# Patient Record
Sex: MALE | Race: WHITE | NOT HISPANIC OR LATINO | Employment: OTHER | URBAN - NONMETROPOLITAN AREA
[De-identification: names, ages, dates, MRNs, and addresses within clinical notes are randomized per-mention and may not be internally consistent; named-entity substitution may affect disease eponyms.]

---

## 2017-01-06 RX ORDER — CANDESARTAN 4 MG/1
TABLET ORAL
Qty: 90 TABLET | Refills: 0 | Status: SHIPPED | OUTPATIENT
Start: 2017-01-06 | End: 2017-04-04 | Stop reason: SDUPTHER

## 2017-02-14 RX ORDER — CLONAZEPAM 1 MG/1
1 TABLET ORAL DAILY
Qty: 90 TABLET | Refills: 0 | OUTPATIENT
Start: 2017-02-14 | End: 2017-05-16 | Stop reason: SDUPTHER

## 2017-03-07 ENCOUNTER — OFFICE VISIT (OUTPATIENT)
Dept: INTERNAL MEDICINE | Facility: CLINIC | Age: 82
End: 2017-03-07

## 2017-03-07 VITALS
OXYGEN SATURATION: 97 % | TEMPERATURE: 97.4 F | HEIGHT: 69 IN | SYSTOLIC BLOOD PRESSURE: 122 MMHG | HEART RATE: 60 BPM | BODY MASS INDEX: 19.11 KG/M2 | DIASTOLIC BLOOD PRESSURE: 72 MMHG | WEIGHT: 129 LBS

## 2017-03-07 DIAGNOSIS — E78.5 HYPERLIPIDEMIA, UNSPECIFIED HYPERLIPIDEMIA TYPE: ICD-10-CM

## 2017-03-07 DIAGNOSIS — E03.8 OTHER SPECIFIED HYPOTHYROIDISM: ICD-10-CM

## 2017-03-07 DIAGNOSIS — I10 ESSENTIAL HYPERTENSION: Primary | ICD-10-CM

## 2017-03-07 DIAGNOSIS — K21.9 GASTROESOPHAGEAL REFLUX DISEASE WITHOUT ESOPHAGITIS: ICD-10-CM

## 2017-03-07 DIAGNOSIS — K59.01 SLOW TRANSIT CONSTIPATION: ICD-10-CM

## 2017-03-07 PROCEDURE — 99214 OFFICE O/P EST MOD 30 MIN: CPT | Performed by: INTERNAL MEDICINE

## 2017-03-07 NOTE — PROGRESS NOTES
"Chief Complaint   Patient presents with   • Follow-up     4 months for HLD, HTN, and GERD. No new complaints.      Subjective   Checo Michele is a 89 y.o. male.     HPI Comments: PMH of HTN, HLD, GERD, DJD, anxiety, and incontinence.   He takes klonapin for anxiety and sleep at night and is doing well with this.   BP is good today. No CP, SOB or edema. NO palpitations.   No GERD sxs.   His urine incontinence is doing well on 4mg of detrol.  He saw Dr Monique in December and no changes were made at that time.   He takes thyroid med every AM, but has been taking it with his vitamins.  His constipation is bothersome. He is taking 3 capful of miralax daily. He is taking a vegetable laxative every night.   He has BM 2-3 times a day- these are loose stools.  He states that if he does not keep bowel movements loose he has severe difficulty having a bowel movement due to a kink near the tip of his rectum.        The following portions of the patient's history were reviewed and updated as appropriate: allergies, current medications, past family history, past medical history, past social history, past surgical history and problem list.    Review of Systems   Respiratory: Negative for shortness of breath.    Cardiovascular: Negative for chest pain, palpitations and leg swelling.   Psychiatric/Behavioral: Negative for dysphoric mood and sleep disturbance. The patient is not nervous/anxious.    All other systems reviewed and are negative.      Objective   Visit Vitals   • /72   • Pulse 60   • Temp 97.4 °F (36.3 °C)   • Ht 69\" (175.3 cm)   • Wt 129 lb (58.5 kg)   • SpO2 97%   • BMI 19.05 kg/m2     Body mass index is 19.05 kg/(m^2).  Physical Exam   Constitutional: He is oriented to person, place, and time.   Thin elderly male in no apparent distress   HENT:   Head: Normocephalic and atraumatic.   Mouth/Throat: Oropharynx is clear and moist.   Hearing aids in place   Eyes: Conjunctivae and EOM are normal. Pupils are " equal, round, and reactive to light.   Neck: Normal range of motion. Neck supple. No thyromegaly present.   Cardiovascular: Normal rate, regular rhythm, normal heart sounds and intact distal pulses.    No murmur heard.  Pulmonary/Chest: Effort normal and breath sounds normal. No respiratory distress.   Abdominal: Soft. Bowel sounds are normal.   Musculoskeletal: He exhibits no edema.   Patient ambulates with slight shuffling gait   Lymphadenopathy:     He has no cervical adenopathy.   Neurological: He is alert and oriented to person, place, and time. No cranial nerve deficit.   Psychiatric: He has a normal mood and affect. Judgment normal.   Nursing note and vitals reviewed.      Assessment/Plan   Checo Michele is here today and the following problems have been addressed:      Checo was seen today for follow-up.    Diagnoses and all orders for this visit:    Essential hypertension    Hyperlipidemia, unspecified hyperlipidemia type  -     Lipid Panel; Future  -     Comprehensive Metabolic Panel; Future    Gastroesophageal reflux disease without esophagitis    Slow transit constipation    Other specified hypothyroidism  -     T4, Free; Future  -     TSH; Future    Follow heart healthy/low salt diet  Take all medications as prescribed  Labs as noted  Take thyroid med on empty stomach  Continue MiraLAX and Senokot for constipation issues  Encouraged 3 meals a day to prevent further weight loss  Filled out handicap parking permit for patient today    RTC 4 mo  Please note that portions of this note were completed with a voice recognition program.  Efforts were made to edit dictation, but occasionally words are mistranscribed.

## 2017-03-08 ENCOUNTER — LAB (OUTPATIENT)
Dept: INTERNAL MEDICINE | Facility: CLINIC | Age: 82
End: 2017-03-08

## 2017-03-08 DIAGNOSIS — E03.8 OTHER SPECIFIED HYPOTHYROIDISM: ICD-10-CM

## 2017-03-08 DIAGNOSIS — E78.5 HYPERLIPIDEMIA, UNSPECIFIED HYPERLIPIDEMIA TYPE: ICD-10-CM

## 2017-03-08 LAB
ALBUMIN SERPL-MCNC: 4 G/DL (ref 3.2–4.8)
ALBUMIN/GLOB SERPL: 1.7 G/DL (ref 1.5–2.5)
ALP SERPL-CCNC: 73 U/L (ref 25–100)
ALT SERPL W P-5'-P-CCNC: 15 U/L (ref 7–40)
ANION GAP SERPL CALCULATED.3IONS-SCNC: 6 MMOL/L (ref 3–11)
ARTICHOKE IGE QN: 93 MG/DL (ref 0–130)
AST SERPL-CCNC: 25 U/L (ref 0–33)
BILIRUB SERPL-MCNC: 1.3 MG/DL (ref 0.3–1.2)
BUN BLD-MCNC: 8 MG/DL (ref 9–23)
BUN/CREAT SERPL: 11.4 (ref 7–25)
CALCIUM SPEC-SCNC: 9.5 MG/DL (ref 8.7–10.4)
CHLORIDE SERPL-SCNC: 100 MMOL/L (ref 99–109)
CHOLEST SERPL-MCNC: 155 MG/DL (ref 0–200)
CO2 SERPL-SCNC: 31 MMOL/L (ref 20–31)
CREAT BLD-MCNC: 0.7 MG/DL (ref 0.6–1.3)
GFR SERPL CREATININE-BSD FRML MDRD: 106 ML/MIN/1.73
GLOBULIN UR ELPH-MCNC: 2.4 GM/DL
GLUCOSE BLD-MCNC: 91 MG/DL (ref 70–100)
HDLC SERPL-MCNC: 54 MG/DL (ref 40–60)
POTASSIUM BLD-SCNC: 5 MMOL/L (ref 3.5–5.5)
PROT SERPL-MCNC: 6.4 G/DL (ref 5.7–8.2)
SODIUM BLD-SCNC: 137 MMOL/L (ref 132–146)
T4 FREE SERPL-MCNC: 1.21 NG/DL (ref 0.89–1.76)
TRIGL SERPL-MCNC: 84 MG/DL (ref 0–150)
TSH SERPL DL<=0.05 MIU/L-ACNC: 3 MIU/ML (ref 0.35–5.35)

## 2017-03-08 PROCEDURE — 36415 COLL VENOUS BLD VENIPUNCTURE: CPT | Performed by: INTERNAL MEDICINE

## 2017-03-08 PROCEDURE — 80053 COMPREHEN METABOLIC PANEL: CPT | Performed by: INTERNAL MEDICINE

## 2017-03-08 PROCEDURE — 84439 ASSAY OF FREE THYROXINE: CPT | Performed by: INTERNAL MEDICINE

## 2017-03-08 PROCEDURE — 80061 LIPID PANEL: CPT | Performed by: INTERNAL MEDICINE

## 2017-03-08 PROCEDURE — 84443 ASSAY THYROID STIM HORMONE: CPT | Performed by: INTERNAL MEDICINE

## 2017-03-10 RX ORDER — ATORVASTATIN CALCIUM 20 MG/1
TABLET, FILM COATED ORAL
Qty: 90 TABLET | Refills: 1 | Status: SHIPPED | OUTPATIENT
Start: 2017-03-10 | End: 2017-07-07 | Stop reason: SDUPTHER

## 2017-03-10 RX ORDER — MIRTAZAPINE 30 MG/1
TABLET, FILM COATED ORAL
Qty: 90 TABLET | Refills: 1 | Status: SHIPPED | OUTPATIENT
Start: 2017-03-10 | End: 2017-07-07 | Stop reason: SDUPTHER

## 2017-04-04 RX ORDER — CANDESARTAN 4 MG/1
TABLET ORAL
Qty: 90 TABLET | Refills: 1 | Status: SHIPPED | OUTPATIENT
Start: 2017-04-04 | End: 2017-10-01 | Stop reason: SDUPTHER

## 2017-05-04 ENCOUNTER — APPOINTMENT (OUTPATIENT)
Dept: CT IMAGING | Facility: HOSPITAL | Age: 82
End: 2017-05-04

## 2017-05-04 ENCOUNTER — HOSPITAL ENCOUNTER (EMERGENCY)
Facility: HOSPITAL | Age: 82
Discharge: HOME | End: 2017-05-04
Attending: EMERGENCY MEDICINE | Admitting: EMERGENCY MEDICINE

## 2017-05-04 ENCOUNTER — TELEPHONE (OUTPATIENT)
Dept: INTERNAL MEDICINE | Facility: CLINIC | Age: 82
End: 2017-05-04

## 2017-05-04 VITALS
HEART RATE: 62 BPM | RESPIRATION RATE: 18 BRPM | BODY MASS INDEX: 19.26 KG/M2 | DIASTOLIC BLOOD PRESSURE: 75 MMHG | WEIGHT: 130 LBS | OXYGEN SATURATION: 100 % | HEIGHT: 69 IN | SYSTOLIC BLOOD PRESSURE: 162 MMHG | TEMPERATURE: 98.9 F

## 2017-05-04 DIAGNOSIS — K59.09 CHRONIC CONSTIPATION: Primary | ICD-10-CM

## 2017-05-04 PROCEDURE — 93005 ELECTROCARDIOGRAM TRACING: CPT

## 2017-05-04 PROCEDURE — 74176 CT ABD & PELVIS W/O CONTRAST: CPT

## 2017-05-04 PROCEDURE — 93005 ELECTROCARDIOGRAM TRACING: CPT | Performed by: EMERGENCY MEDICINE

## 2017-05-04 PROCEDURE — 99283 EMERGENCY DEPT VISIT LOW MDM: CPT

## 2017-05-11 ENCOUNTER — TELEPHONE (OUTPATIENT)
Dept: INTERNAL MEDICINE | Facility: CLINIC | Age: 82
End: 2017-05-11

## 2017-05-16 RX ORDER — CLONAZEPAM 1 MG/1
1 TABLET ORAL DAILY
Qty: 90 TABLET | Refills: 0 | OUTPATIENT
Start: 2017-05-16 | End: 2017-08-10 | Stop reason: SDUPTHER

## 2017-05-18 ENCOUNTER — OFFICE VISIT (OUTPATIENT)
Dept: GASTROENTEROLOGY | Facility: CLINIC | Age: 82
End: 2017-05-18

## 2017-05-18 VITALS
RESPIRATION RATE: 15 BRPM | HEART RATE: 59 BPM | WEIGHT: 127 LBS | SYSTOLIC BLOOD PRESSURE: 153 MMHG | TEMPERATURE: 98.4 F | BODY MASS INDEX: 18.81 KG/M2 | DIASTOLIC BLOOD PRESSURE: 63 MMHG | HEIGHT: 69 IN

## 2017-05-18 DIAGNOSIS — Z78.9 FREQUENT USE OF LAXATIVES: Chronic | ICD-10-CM

## 2017-05-18 DIAGNOSIS — K59.00 CONSTIPATION, UNSPECIFIED CONSTIPATION TYPE: Primary | ICD-10-CM

## 2017-05-18 PROCEDURE — 99203 OFFICE O/P NEW LOW 30 MIN: CPT | Performed by: NURSE PRACTITIONER

## 2017-06-14 ENCOUNTER — TELEPHONE (OUTPATIENT)
Dept: INTERNAL MEDICINE | Facility: CLINIC | Age: 82
End: 2017-06-14

## 2017-06-14 RX ORDER — LEVOTHYROXINE SODIUM 0.03 MG/1
TABLET ORAL
Qty: 135 TABLET | Refills: 3 | Status: SHIPPED | OUTPATIENT
Start: 2017-06-14 | End: 2018-06-09 | Stop reason: SDUPTHER

## 2017-06-29 ENCOUNTER — OFFICE VISIT (OUTPATIENT)
Dept: GASTROENTEROLOGY | Facility: CLINIC | Age: 82
End: 2017-06-29

## 2017-06-29 VITALS
BODY MASS INDEX: 18.96 KG/M2 | RESPIRATION RATE: 15 BRPM | HEART RATE: 62 BPM | HEIGHT: 69 IN | TEMPERATURE: 97.7 F | SYSTOLIC BLOOD PRESSURE: 195 MMHG | WEIGHT: 128 LBS | DIASTOLIC BLOOD PRESSURE: 76 MMHG

## 2017-06-29 DIAGNOSIS — K59.00 CONSTIPATION, UNSPECIFIED CONSTIPATION TYPE: Primary | ICD-10-CM

## 2017-06-29 PROCEDURE — 99214 OFFICE O/P EST MOD 30 MIN: CPT | Performed by: INTERNAL MEDICINE

## 2017-07-04 NOTE — PATIENT INSTRUCTIONS
1. High-fiber diet with liberal water intake.  Discussed in detail.  2. The patient may take magnesium based products as such as Nielsen magnesium caplet over-the-counter one at night on daily basis.  3. He may discontinue stool softener on regular basis however on medications the patient may take stool softener if needed.  4. The patient may call back in one to 2 weeks regarding progress.  5. Follow-up:  The patient will call back on an as-needed basis.

## 2017-07-07 ENCOUNTER — HOSPITAL ENCOUNTER (OUTPATIENT)
Dept: GENERAL RADIOLOGY | Facility: HOSPITAL | Age: 82
Discharge: HOME | End: 2017-07-07
Attending: INTERNAL MEDICINE | Admitting: INTERNAL MEDICINE

## 2017-07-07 ENCOUNTER — OFFICE VISIT (OUTPATIENT)
Dept: INTERNAL MEDICINE | Facility: CLINIC | Age: 82
End: 2017-07-07

## 2017-07-07 VITALS
OXYGEN SATURATION: 97 % | SYSTOLIC BLOOD PRESSURE: 148 MMHG | TEMPERATURE: 98 F | HEART RATE: 64 BPM | WEIGHT: 128 LBS | DIASTOLIC BLOOD PRESSURE: 68 MMHG | BODY MASS INDEX: 18.96 KG/M2 | HEIGHT: 69 IN

## 2017-07-07 DIAGNOSIS — E78.2 MIXED HYPERLIPIDEMIA: ICD-10-CM

## 2017-07-07 DIAGNOSIS — M25.561 CHRONIC PAIN OF RIGHT KNEE: ICD-10-CM

## 2017-07-07 DIAGNOSIS — I10 ESSENTIAL HYPERTENSION: Primary | ICD-10-CM

## 2017-07-07 DIAGNOSIS — G89.29 CHRONIC PAIN OF RIGHT KNEE: ICD-10-CM

## 2017-07-07 DIAGNOSIS — K21.9 GASTROESOPHAGEAL REFLUX DISEASE WITHOUT ESOPHAGITIS: ICD-10-CM

## 2017-07-07 DIAGNOSIS — K59.01 SLOW TRANSIT CONSTIPATION: ICD-10-CM

## 2017-07-07 PROCEDURE — 73562 X-RAY EXAM OF KNEE 3: CPT

## 2017-07-07 PROCEDURE — 99214 OFFICE O/P EST MOD 30 MIN: CPT | Performed by: INTERNAL MEDICINE

## 2017-07-07 RX ORDER — MIRTAZAPINE 30 MG/1
30 TABLET, FILM COATED ORAL NIGHTLY
Qty: 90 TABLET | Refills: 1 | Status: SHIPPED | OUTPATIENT
Start: 2017-07-07 | End: 2018-01-29 | Stop reason: SDUPTHER

## 2017-07-07 RX ORDER — ATORVASTATIN CALCIUM 20 MG/1
20 TABLET, FILM COATED ORAL DAILY
Qty: 90 TABLET | Refills: 1 | Status: SHIPPED | OUTPATIENT
Start: 2017-07-07 | End: 2018-01-29 | Stop reason: SDUPTHER

## 2017-07-07 RX ORDER — ATENOLOL 100 MG/1
100 TABLET ORAL DAILY
Qty: 90 TABLET | Refills: 3 | Status: SHIPPED | OUTPATIENT
Start: 2017-07-07 | End: 2017-07-10 | Stop reason: SDUPTHER

## 2017-07-07 RX ORDER — AMLODIPINE BESYLATE 5 MG/1
5 TABLET ORAL DAILY
Qty: 30 TABLET | Refills: 1 | Status: SHIPPED | OUTPATIENT
Start: 2017-07-07 | End: 2017-08-18 | Stop reason: SDUPTHER

## 2017-07-07 NOTE — PROGRESS NOTES
"Chief Complaint   Patient presents with   • Follow-up     4 months for GERD, HLD, and HTN. Pt states he's been having right knee pain.      Subjective   Checo Michele is a 89 y.o. male.     HPI Comments: PMH of HTN, HLD, GERD, DJD, anxiety, and incontinence.   He takes klonapin for anxiety and sleep at night and is doing well with this.   BP is elevated today. No CP, SOB or edema. NO palpitations.  He has not been checking BP.  It has been elevated at recent doctors visits.   No GERD sxs.  He takes an occasional gas-X.   His urine incontinence is doing well on 4mg of detrol. This is working well for him overally.   He takes thyroid med every AM by itself.  He denies any heat or cold intolerace.   His constipation is bothersome. He has been seeing Dr Bridges.  Was told he was taking too many laxative.  He takes more fiber and less laxatives now with occasional MOM caplet.   He does not follow a specific diet for his cholesterol.  He is having some right knee pain.        The following portions of the patient's history were reviewed and updated as appropriate: allergies, current medications, past family history, past medical history, past social history, past surgical history and problem list.    Review of Systems   Respiratory: Negative for shortness of breath.    Cardiovascular: Negative for chest pain, palpitations and leg swelling.   Gastrointestinal: Positive for constipation.   Genitourinary:        Mild urinary incontinence   Musculoskeletal: Positive for arthralgias.   Psychiatric/Behavioral: Positive for sleep disturbance. The patient is nervous/anxious.    All other systems reviewed and are negative.      Objective   /68  Pulse 64  Temp 98 °F (36.7 °C)  Ht 69\" (175.3 cm)  Wt 128 lb (58.1 kg)  SpO2 97%  BMI 18.9 kg/m2  Body mass index is 18.9 kg/(m^2).  Physical Exam   Constitutional: He is oriented to person, place, and time. He appears well-developed and well-nourished.   HENT:   Head: " Normocephalic and atraumatic.   Mouth/Throat: Oropharynx is clear and moist.   Poor dentition noted   Eyes: Conjunctivae and EOM are normal. Pupils are equal, round, and reactive to light.   Neck: Normal range of motion. Neck supple. No thyromegaly present.   Cardiovascular: Normal rate, regular rhythm, normal heart sounds and intact distal pulses.    No murmur heard.  Pulmonary/Chest: Effort normal and breath sounds normal. No respiratory distress.   Abdominal: Soft. Bowel sounds are normal.   Musculoskeletal: He exhibits no edema.   Right knee with full range of motion but pain inferior to patella bilaterally, no ballottement or effusion, negative drawer and Lachman sign   Lymphadenopathy:     He has no cervical adenopathy.   Neurological: He is alert and oriented to person, place, and time. No cranial nerve deficit.   Psychiatric: He has a normal mood and affect. Judgment normal.   Nursing note and vitals reviewed.      Assessment/Plan   Checo Michele is here today and the following problems have been addressed:      Checo was seen today for follow-up.    Diagnoses and all orders for this visit:    Essential hypertension    Mixed hyperlipidemia    Gastroesophageal reflux disease without esophagitis    Slow transit constipation    Chronic pain of right knee  -     XR Knee 3 View Right; Future    Other orders  -     mirtazapine (REMERON) 30 MG tablet; Take 1 tablet by mouth Every Night.  -     atenolol (TENORMIN) 100 MG tablet; Take 1 tablet by mouth Daily.  -     atorvastatin (LIPITOR) 20 MG tablet; Take 1 tablet by mouth Daily.  -     amLODIPine (NORVASC) 5 MG tablet; Take 1 tablet by mouth Daily.  Follow heart healthy/low salt diet  Avoid processed foods  Monitor blood pressure as discussed  Exercise as tolerated up to 30 minutes 5 days per week  Take all medications as prescribed  Start amlodipine 5 mg q AM  XR of right knee ordered due to chronic pain and feeling it will give out on him  No other  changes today    RTC  6 weeks    Please note that portions of this note were completed with a voice recognition program.  Efforts were made to edit dictation, but occasionally words are mistranscribed.

## 2017-07-10 ENCOUNTER — TELEPHONE (OUTPATIENT)
Dept: INTERNAL MEDICINE | Facility: CLINIC | Age: 82
End: 2017-07-10

## 2017-07-10 RX ORDER — ATENOLOL 50 MG/1
100 TABLET ORAL DAILY
Qty: 180 TABLET | Refills: 1 | Status: SHIPPED | OUTPATIENT
Start: 2017-07-10 | End: 2017-12-19 | Stop reason: SDUPTHER

## 2017-07-10 RX ORDER — ATENOLOL 50 MG/1
100 TABLET ORAL DAILY
Qty: 180 TABLET | Refills: 1 | Status: CANCELLED | OUTPATIENT
Start: 2017-07-10

## 2017-07-10 NOTE — TELEPHONE ENCOUNTER
Patient said [facility] doesn't have the 100mg tablets anymore. I explained to him that it looks like he's taking the 50mg tablets twice a day, so it should be okay when it gets reordered.    He'd like a callback if there's any problems.

## 2017-07-11 ENCOUNTER — TELEPHONE (OUTPATIENT)
Dept: INTERNAL MEDICINE | Facility: CLINIC | Age: 82
End: 2017-07-11

## 2017-07-11 NOTE — TELEPHONE ENCOUNTER
----- Message from Marilyn K Vermeesch, MD sent at 7/11/2017  7:49 AM EDT -----  Please tell patient that x-ray of knee is consistent with arthritis changes only.

## 2017-08-10 RX ORDER — CLONAZEPAM 1 MG/1
1 TABLET ORAL DAILY
Qty: 90 TABLET | Refills: 0 | OUTPATIENT
Start: 2017-08-10 | End: 2017-08-10 | Stop reason: SDUPTHER

## 2017-08-10 RX ORDER — CLONAZEPAM 1 MG/1
1 TABLET ORAL DAILY
Qty: 90 TABLET | Refills: 0 | OUTPATIENT
Start: 2017-08-10 | End: 2017-11-06 | Stop reason: SDUPTHER

## 2017-08-18 ENCOUNTER — OFFICE VISIT (OUTPATIENT)
Dept: INTERNAL MEDICINE | Facility: CLINIC | Age: 82
End: 2017-08-18

## 2017-08-18 VITALS
WEIGHT: 127 LBS | SYSTOLIC BLOOD PRESSURE: 124 MMHG | HEART RATE: 60 BPM | HEIGHT: 69 IN | TEMPERATURE: 98.2 F | BODY MASS INDEX: 18.81 KG/M2 | DIASTOLIC BLOOD PRESSURE: 66 MMHG | OXYGEN SATURATION: 96 %

## 2017-08-18 DIAGNOSIS — S51.811A SKIN TEAR OF RIGHT FOREARM WITHOUT COMPLICATION, INITIAL ENCOUNTER: ICD-10-CM

## 2017-08-18 DIAGNOSIS — I10 ESSENTIAL HYPERTENSION: Primary | ICD-10-CM

## 2017-08-18 PROCEDURE — 99213 OFFICE O/P EST LOW 20 MIN: CPT | Performed by: INTERNAL MEDICINE

## 2017-08-18 RX ORDER — AMLODIPINE BESYLATE 5 MG/1
5 TABLET ORAL DAILY
Qty: 30 TABLET | Refills: 11 | Status: SHIPPED | OUTPATIENT
Start: 2017-08-18 | End: 2018-03-16

## 2017-08-18 NOTE — PROGRESS NOTES
"Chief Complaint   Patient presents with   • Follow-up     6 weeks for HTN and right knee pain. Pt has wound on right forearm.      Subjective   Checo Michele is a 89 y.o. male.     HPI Comments: Here for followup of HTN and right knee pain.  Last visit he was started on amlodipine 5 mg daily.  His BP is running in the 110-120/50-70s.   No CP, SOB, edema.   XR of right knee showed only DJD changes.   No falls.  He takes ES tylenol prn for pain.    He has a skin tear to right forearm. This occurred 4 days ago.  It is slightly red.  He was seen at  and given bactrim and mupirocin, but does not want to take the bactrim for it.        The following portions of the patient's history were reviewed and updated as appropriate: allergies, current medications, past family history, past medical history, past social history, past surgical history and problem list.    Review of Systems   Respiratory: Negative for shortness of breath.    Cardiovascular: Negative for chest pain, palpitations and leg swelling.   Musculoskeletal: Positive for arthralgias.   Skin: Positive for wound.       Objective   /66  Pulse 60  Temp 98.2 °F (36.8 °C)  Ht 69\" (175.3 cm)  Wt 127 lb (57.6 kg)  SpO2 96%  BMI 18.75 kg/m2  Body mass index is 18.75 kg/(m^2).  Physical Exam   Constitutional: He is oriented to person, place, and time. He appears well-developed and well-nourished.   HENT:   Head: Normocephalic and atraumatic.   Eyes: EOM are normal. Pupils are equal, round, and reactive to light.   Cardiovascular: Normal rate, regular rhythm, normal heart sounds and intact distal pulses.    No murmur heard.  Pulmonary/Chest: Effort normal and breath sounds normal.   Musculoskeletal: He exhibits no edema.   Neurological: He is alert and oriented to person, place, and time.   Skin:   Right forearm V-shaped skin tear with surrounding erythema approximately 1 cm and mild increased warmth but no discharge noted and no tenderness to " palpation   Psychiatric: He has a normal mood and affect. Judgment normal.   Nursing note and vitals reviewed.      Assessment/Plan   Checo Michele is here today and the following problems have been addressed:      Checo was seen today for follow-up.    Diagnoses and all orders for this visit:    Essential hypertension    Skin tear of right forearm without complication, initial encounter    Other orders  -     amLODIPine (NORVASC) 5 MG tablet; Take 1 tablet by mouth Daily.    Follow heart healthy/low salt diet  Avoid processed foods  Monitor blood pressure as discussed  Exercise as tolerated up to 30 minutes 5 days per week  Take all medications as prescribed  Continue mucipiron for skin tear twice a day and use bactrim only if surrounding redness worsens-area of erythema was marked with pen and patient was told to take Bactrim if erythema extended beyond pen line    RTC 3 mo, labs then  Please note that portions of this note were completed with a voice recognition program.  Efforts were made to edit dictation, but occasionally words are mistranscribed.

## 2017-10-02 RX ORDER — CANDESARTAN 4 MG/1
TABLET ORAL
Qty: 90 TABLET | Refills: 1 | Status: SHIPPED | OUTPATIENT
Start: 2017-10-02 | End: 2018-03-29 | Stop reason: SDUPTHER

## 2017-11-07 RX ORDER — CLONAZEPAM 1 MG/1
TABLET ORAL
Qty: 90 TABLET | Refills: 0 | Status: SHIPPED | OUTPATIENT
Start: 2017-11-07 | End: 2017-11-07 | Stop reason: SDUPTHER

## 2017-11-07 RX ORDER — CLONAZEPAM 1 MG/1
1 TABLET ORAL DAILY
Qty: 90 TABLET | Refills: 0 | OUTPATIENT
Start: 2017-11-07 | End: 2018-02-08 | Stop reason: SDUPTHER

## 2017-11-16 ENCOUNTER — OFFICE VISIT (OUTPATIENT)
Dept: INTERNAL MEDICINE | Facility: CLINIC | Age: 82
End: 2017-11-16

## 2017-11-16 VITALS
DIASTOLIC BLOOD PRESSURE: 62 MMHG | HEIGHT: 69 IN | SYSTOLIC BLOOD PRESSURE: 122 MMHG | BODY MASS INDEX: 18.51 KG/M2 | TEMPERATURE: 98.1 F | HEART RATE: 57 BPM | WEIGHT: 125 LBS | OXYGEN SATURATION: 96 %

## 2017-11-16 DIAGNOSIS — K59.01 SLOW TRANSIT CONSTIPATION: ICD-10-CM

## 2017-11-16 DIAGNOSIS — E78.2 MIXED HYPERLIPIDEMIA: ICD-10-CM

## 2017-11-16 DIAGNOSIS — K21.9 GASTROESOPHAGEAL REFLUX DISEASE WITHOUT ESOPHAGITIS: ICD-10-CM

## 2017-11-16 DIAGNOSIS — I10 ESSENTIAL HYPERTENSION: Primary | ICD-10-CM

## 2017-11-16 DIAGNOSIS — E03.8 OTHER SPECIFIED HYPOTHYROIDISM: ICD-10-CM

## 2017-11-16 PROBLEM — S51.811A SKIN TEAR OF RIGHT FOREARM WITHOUT COMPLICATION: Status: RESOLVED | Noted: 2017-08-18 | Resolved: 2017-11-16

## 2017-11-16 PROCEDURE — 99213 OFFICE O/P EST LOW 20 MIN: CPT | Performed by: INTERNAL MEDICINE

## 2017-11-16 NOTE — PROGRESS NOTES
"Chief Complaint   Patient presents with   • Follow-up     3 months for HLD, HTN, and Hypothyroidism. Pt states he has only been taking 50mg of Atenolol     Subjective   Checo Michele is a 89 y.o. male.     HPI Comments: Here for follow-up of HTN, HLD, GERD, DJD, anxiety, hypothyroid and incontinence.   His BP is well controlled, runs 120s/60s.  He denies any CP, SOB, edema or palpitations.   No GERD sxs.   He has no anxiety.  Uses klonapin one a day at night.  He sleeps well.   Wears depends all the time now.    Tries to eat HH diet.  Avoids fast foods and does not go out to eat often.   He is compliant with thyroid meds.   He takes senna regularly for his chronic constipation and occasional dulcolax.   He has had his flu shot in October.            The following portions of the patient's history were reviewed and updated as appropriate: allergies, current medications, past family history, past medical history, past social history, past surgical history and problem list.    Review of Systems   Constitutional: Negative for activity change, appetite change and unexpected weight change.   HENT: Positive for hearing loss.    Respiratory: Negative for shortness of breath.    Cardiovascular: Negative for chest pain, palpitations and leg swelling.   Gastrointestinal: Positive for constipation.   Genitourinary:        Chronic urinary incontinence   Musculoskeletal: Positive for arthralgias. Negative for myalgias.   Psychiatric/Behavioral: Negative for dysphoric mood and sleep disturbance. The patient is not nervous/anxious.    All other systems reviewed and are negative.      Objective   /62  Pulse 57  Temp 98.1 °F (36.7 °C)  Ht 69\" (175.3 cm)  Wt 125 lb (56.7 kg)  SpO2 96%  BMI 18.46 kg/m2  Body mass index is 18.46 kg/(m^2).  Physical Exam   Constitutional: He is oriented to person, place, and time. He appears well-developed and well-nourished.   Very pleasant elderly male, no apparent distress "   HENT:   Head: Normocephalic and atraumatic.   Mouth/Throat: Oropharynx is clear and moist.   Hearing aids in place  Poor dentition   Eyes: Conjunctivae and EOM are normal. Pupils are equal, round, and reactive to light.   Neck: Normal range of motion. Neck supple. No thyromegaly present.   Cardiovascular: Normal rate, regular rhythm, normal heart sounds and intact distal pulses.    No murmur heard.  Pulmonary/Chest: Effort normal and breath sounds normal. No respiratory distress.   Abdominal: Soft. Bowel sounds are normal.   Musculoskeletal: He exhibits no edema.   Kyphosis noted   Lymphadenopathy:     He has no cervical adenopathy.   Neurological: He is alert and oriented to person, place, and time. No cranial nerve deficit.   Psychiatric: He has a normal mood and affect. Judgment normal.   Nursing note and vitals reviewed.      Assessment/Plan   Checo Michele is here today and the following problems have been addressed:      Checo was seen today for follow-up.    Diagnoses and all orders for this visit:    Essential hypertension    Mixed hyperlipidemia    Gastroesophageal reflux disease without esophagitis    Other specified hypothyroidism    Slow transit constipation      Follow heart healthy/low salt diet  Avoid processed foods  Monitor blood pressure as discussed  Exercise as tolerated up to 30 minutes 5 days per week  Take all medications as prescribed  Labs next visit  Will check FOBT with labs next visit    RTC 4 mo for medicare wellness    Please note that portions of this note were completed with a voice recognition program.  Efforts were made to edit dictation, but occasionally words are mistranscribed.

## 2017-11-30 RX ORDER — TOLTERODINE 4 MG/1
CAPSULE, EXTENDED RELEASE ORAL
Qty: 90 CAPSULE | Refills: 3 | Status: SHIPPED | OUTPATIENT
Start: 2017-11-30 | End: 2018-11-25 | Stop reason: SDUPTHER

## 2017-12-15 ENCOUNTER — APPOINTMENT (OUTPATIENT)
Dept: CT IMAGING | Facility: HOSPITAL | Age: 82
End: 2017-12-15

## 2017-12-15 ENCOUNTER — HOSPITAL ENCOUNTER (EMERGENCY)
Facility: HOSPITAL | Age: 82
Discharge: HOME | End: 2017-12-15
Attending: EMERGENCY MEDICINE | Admitting: EMERGENCY MEDICINE

## 2017-12-15 VITALS
RESPIRATION RATE: 17 BRPM | DIASTOLIC BLOOD PRESSURE: 68 MMHG | WEIGHT: 120 LBS | SYSTOLIC BLOOD PRESSURE: 147 MMHG | HEIGHT: 69 IN | OXYGEN SATURATION: 100 % | HEART RATE: 60 BPM | BODY MASS INDEX: 17.77 KG/M2 | TEMPERATURE: 97.6 F

## 2017-12-15 DIAGNOSIS — S09.90XA ACUTE HEAD INJURY WITHOUT LOSS OF CONSCIOUSNESS, INITIAL ENCOUNTER: Primary | ICD-10-CM

## 2017-12-15 PROCEDURE — 99283 EMERGENCY DEPT VISIT LOW MDM: CPT

## 2017-12-15 PROCEDURE — 70450 CT HEAD/BRAIN W/O DYE: CPT

## 2017-12-15 NOTE — ED PROVIDER NOTES
Subjective   HPI Comments: 89-year-old male that is on aspirin as his only blood thinner.  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  Has had a moderate achy type headache since that time without vomiting or neck pain.  No mental status or gait changes according to his daughter who accompanies him.  Apparently had a brain bleed requiring brain surgery many years ago and is concerned that he might have the same problem again.  He did not fall to the ground and did not hurt anything else.    Aggravating factors: None.  Alleviating factors and treatment prior to arrival: Tylenol.      History provided by:  Patient  History limited by:  Acuity of condition   used: No        Review of Systems   Constitutional: Negative.    Eyes: Negative.    Respiratory: Negative.    Cardiovascular: Negative.  Negative for chest pain.   Gastrointestinal: Negative.  Negative for abdominal pain.   Endocrine: Negative.    Genitourinary: Negative for dysuria.   Musculoskeletal: Negative.    Skin: Negative.    Allergic/Immunologic: Negative.    Neurological: Positive for headaches.   Hematological: Negative.    Psychiatric/Behavioral: Negative.    All other systems reviewed and are negative.      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    • Sleep apnea    • Spermatocele    • Syncope, near    • URTI (acute upper respiratory infection)        Allergies   Allergen Reactions   • Ace Inhibitors    • Amoxicillin-Pot Clavulanate    • Cefaclor    • Hydrochlorothiazide    • Nitrofurantoin         Past Surgical History:   Procedure Laterality Date   • CHOLECYSTECTOMY  20 years ago   • COLONOSCOPY  15-20 years ago   • COLONOSCOPY W/ POLYPECTOMY      Dr Toledo   • CYSTOSCOPY TRANSURETHRAL RESECTION OF PROSTATE     • PROSTATECTOMY      non cancer, Dr Toledo   • TONSILLECTOMY  1931       Family History   Problem Relation Age of Onset   • Other Other      STROKE SYNDROME   • Colon cancer Neg Hx        Social History     Social History   • Marital status:      Spouse name: N/A   • Number of children: N/A   • Years of education: N/A     Social History Main Topics   • Smoking status: Never Smoker   • Smokeless tobacco: Never Used   • Alcohol use No   • Drug use: No   • Sexual activity: Not Asked     Other Topics Concern   • None     Social History Narrative           Objective   Physical Exam   Constitutional: He is oriented to person, place, and time. He appears well-developed and well-nourished. No distress.   HENT:   Head: Normocephalic.   Right Ear: External ear normal.   Left Ear: External ear normal.   Minimal swelling to the left parietal region.   Eyes: EOM are normal. Pupils are equal, round, and reactive to light.   Neck: Normal range of motion. Neck supple.   Neck is nontender in the midline.   Cardiovascular: Normal rate, regular rhythm and normal heart sounds.    Pulmonary/Chest: Effort normal and breath sounds normal. No stridor. He has no wheezes. He exhibits no tenderness.   Abdominal: Soft. He exhibits no distension. There is no tenderness. There is no rebound and no guarding.   Musculoskeletal: Normal range of motion. He exhibits no edema.   Extremities are nontender.   Neurological: He is alert and oriented to person, place, and time.   Skin: Skin is warm and dry. No rash noted. He is not diaphoretic.   Psychiatric: He has a normal mood and affect. His behavior is normal. Judgment and thought content normal.   Nursing note and vitals reviewed.      Procedures         ED Course  ED Course    Comment By Time   Patient is also excessively concerned about his blood pressure.  Rechecked at this time it is 149/76.  Advised to check his blood pressures 2 or 3 times daily at home and call his doctor with results for possible blood pressure medication adjustment.  He verbalized understanding. Domitila Bennett PA-C 12/15 2299                  MDM  Number of Diagnoses or Management Options  Acute head injury without loss of consciousness, initial encounter: new and requires workup     Amount and/or Complexity of Data Reviewed  Tests in the radiology section of CPT®: ordered and reviewed  Discuss the patient with other providers: yes    Risk of Complications, Morbidity, and/or Mortality  Presenting problems: moderate  Diagnostic procedures: low  Management options: moderate    Patient Progress  Patient progress: stable      Final diagnoses:   Acute head injury without loss of consciousness, initial encounter            Domitila Bennett PA-C  12/15/17 1551

## 2017-12-15 NOTE — DISCHARGE INSTRUCTIONS
Return to the ER for systolic blood pressure greater than 200, diastolic blood pressure greater than 100, markedly worsening headache, multiple episodes of vomiting, trouble walking, trouble talking, weakness in either arm or leg.    Take your blood pressure 2-3 times daily and call your doctor with results on Monday for possible blood pressure medication adjustment.    Continue extra strength Tylenol as directed on the bottle as needed for headache.

## 2017-12-18 ENCOUNTER — TELEPHONE (OUTPATIENT)
Dept: INTERNAL MEDICINE | Facility: CLINIC | Age: 82
End: 2017-12-18

## 2017-12-18 NOTE — TELEPHONE ENCOUNTER
Pt stopped by office in regards to message. Pt states BP has been fine since he's been home. Dr. Vermeesch had me advise Pt that he doesn't have to make a f/u appt and to just keep an eye on BP, that if it is constantly 140/90 or above to call.

## 2017-12-18 NOTE — TELEPHONE ENCOUNTER
Patient was in [facility] ER on 12/15/17 with blood pressure of 154/61 when he went to ER and 160/71 when he left. Has been down to 120/? through the weekend.  Mr states he would rather not make a follow up but request call back.

## 2017-12-20 RX ORDER — ATENOLOL 50 MG/1
TABLET ORAL
Qty: 180 TABLET | Refills: 1 | Status: SHIPPED | OUTPATIENT
Start: 2017-12-20 | End: 2018-06-17 | Stop reason: SDUPTHER

## 2018-01-10 ENCOUNTER — OFFICE VISIT (OUTPATIENT)
Dept: UROLOGY | Facility: CLINIC | Age: 83
End: 2018-01-10

## 2018-01-10 VITALS
OXYGEN SATURATION: 96 % | TEMPERATURE: 98.5 F | HEIGHT: 69 IN | SYSTOLIC BLOOD PRESSURE: 112 MMHG | DIASTOLIC BLOOD PRESSURE: 70 MMHG | WEIGHT: 120 LBS | HEART RATE: 58 BPM | BODY MASS INDEX: 17.77 KG/M2

## 2018-01-10 DIAGNOSIS — N40.1 BENIGN PROSTATIC HYPERPLASIA WITH LOWER URINARY TRACT SYMPTOMS, SYMPTOM DETAILS UNSPECIFIED: Primary | ICD-10-CM

## 2018-01-10 DIAGNOSIS — Z87.898 HISTORY OF NOCTURIA: ICD-10-CM

## 2018-01-10 DIAGNOSIS — Z87.898 HISTORY OF URINARY FREQUENCY: ICD-10-CM

## 2018-01-10 LAB
BILIRUB BLD-MCNC: NEGATIVE MG/DL
CLARITY, POC: CLEAR
COLOR UR: YELLOW
GLUCOSE UR STRIP-MCNC: NEGATIVE MG/DL
KETONES UR QL: NEGATIVE
LEUKOCYTE EST, POC: NEGATIVE
NITRITE UR-MCNC: NEGATIVE MG/ML
PH UR: 7.5 [PH] (ref 5–8)
PROT UR STRIP-MCNC: NEGATIVE MG/DL
RBC # UR STRIP: NEGATIVE /UL
SP GR UR: 1.02 (ref 1–1.03)
UROBILINOGEN UR QL: NORMAL

## 2018-01-10 PROCEDURE — 81003 URINALYSIS AUTO W/O SCOPE: CPT | Performed by: UROLOGY

## 2018-01-10 PROCEDURE — 99213 OFFICE O/P EST LOW 20 MIN: CPT | Performed by: UROLOGY

## 2018-01-10 NOTE — PROGRESS NOTES
Chief Complaint  Benign Prostatic Hypertrophy (with a history of urinary ostruction. Patient is here for a yearly fup.); history of nocturia; history of urinary frequency; and history of spermatocele        HPI  Leny is a 89 y.o. male who returns today for annual checkup generally doing well from a urological standpoint.  He had a TURP some 30 years ago and basically has been voiding okay ever since.  He wears a depends but he states after 24 hours its just damp.  Otherwise he denies any difficulty voiding.  He is currently on no prescription medications for many.    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    • Sleep apnea    • Spermatocele    • Syncope, near    • URTI (acute upper respiratory infection)        Past Surgical History  Past Surgical History:   Procedure Laterality Date   • CHOLECYSTECTOMY  20 years ago   • COLONOSCOPY  15-20 years ago   • COLONOSCOPY W/ POLYPECTOMY      Dr Toledo   • CYSTOSCOPY TRANSURETHRAL RESECTION OF PROSTATE     • PROSTATECTOMY      non cancer, Dr Toledo   • TONSILLECTOMY  1931       Medications  has a current medication list which includes the following prescription(s): amlodipine, aspirin, atenolol, atorvastatin, beta carotene, candesartan, clonazepam, levothyroxine, mirtazapine, multiple vitamins-minerals, polyethylene glycol 3350, tolterodine la, mupirocin, and omega-3 fatty acids.      Allergies  Allergies   Allergen Reactions   • Ace Inhibitors    • Amoxicillin-Pot Clavulanate    • Cefaclor    • Hydrochlorothiazide    • Nitrofurantoin        Social History  Social History     Social History Narrative       Family History  He has no  family history of bladder or kidney cancer  He has no family history of kidney stones      AUA Symptom Score:      Review of Systems  Review of Systems    Physical Exam  Physical Exam   Constitutional: He is oriented to person, place, and time. He appears well-developed and well-nourished.   HENT:   Head: Normocephalic and atraumatic.   Neck: Normal range of motion.   Pulmonary/Chest: Effort normal. No respiratory distress.   Abdominal: Soft. He exhibits no distension and no mass. There is no tenderness. No hernia.   Genitourinary: Rectum normal and prostate normal.   Musculoskeletal: Normal range of motion.   Lymphadenopathy:     He has no cervical adenopathy.   Neurological: He is alert and oriented to person, place, and time.   Skin: Skin is warm and dry.   Psychiatric: He has a normal mood and affect. His behavior is normal.   Vitals reviewed.      Labs Recent and today in the office:  Results for orders placed or performed in visit on 01/10/18   POC Urinalysis Dipstick, Automated   Result Value Ref Range    Color Yellow Yellow, Straw, Dark Yellow, Izzy    Clarity, UA Clear Clear    Glucose, UA Negative Negative, 1000 mg/dL (3+) mg/dL    Bilirubin Negative Negative    Ketones, UA Negative Negative    Specific Gravity  1.020 1.005 - 1.030    Blood, UA Negative Negative    pH, Urine 7.5 5.0 - 8.0    Protein, POC Negative Negative mg/dL    Urobilinogen, UA Normal Normal    Leukocytes Negative Negative    Nitrite, UA Negative Negative         Assessment & Plan  BPH/frequency: Symptoms are minimal on Detrol and his urine today is clear.  Digital rectal exam reveals some induration and this could be a postoperative change.  There is a good chance that he has prostate cancer because of his age but he is a candidate for palliative therapy only.

## 2018-01-29 RX ORDER — MIRTAZAPINE 30 MG/1
TABLET, FILM COATED ORAL
Qty: 90 TABLET | Refills: 1 | Status: SHIPPED | OUTPATIENT
Start: 2018-01-29 | End: 2018-07-17 | Stop reason: SDUPTHER

## 2018-01-29 RX ORDER — ATORVASTATIN CALCIUM 20 MG/1
TABLET, FILM COATED ORAL
Qty: 90 TABLET | Refills: 1 | Status: SHIPPED | OUTPATIENT
Start: 2018-01-29 | End: 2018-07-17 | Stop reason: SDUPTHER

## 2018-02-08 ENCOUNTER — TELEPHONE (OUTPATIENT)
Dept: INTERNAL MEDICINE | Facility: CLINIC | Age: 83
End: 2018-02-08

## 2018-02-08 RX ORDER — CLONAZEPAM 1 MG/1
1 TABLET ORAL DAILY
Qty: 90 TABLET | Refills: 0 | OUTPATIENT
Start: 2018-02-08 | End: 2018-05-07 | Stop reason: SDUPTHER

## 2018-03-13 ENCOUNTER — CLINICAL SUPPORT (OUTPATIENT)
Dept: INTERNAL MEDICINE | Facility: CLINIC | Age: 83
End: 2018-03-13

## 2018-03-13 DIAGNOSIS — K92.1 BLOOD IN STOOL: Primary | ICD-10-CM

## 2018-03-13 LAB
EXPIRATION DATE 2: NORMAL
EXPIRATION DATE 3: NORMAL
EXPIRATION DATE: NORMAL
GASTROCULT GAST QL: NEGATIVE
HEMOCCULT SP2 STL QL: NEGATIVE
HEMOCCULT SP3 STL QL: NEGATIVE
Lab: NORMAL

## 2018-03-13 PROCEDURE — 82270 OCCULT BLOOD FECES: CPT | Performed by: INTERNAL MEDICINE

## 2018-03-16 ENCOUNTER — OFFICE VISIT (OUTPATIENT)
Dept: INTERNAL MEDICINE | Facility: CLINIC | Age: 83
End: 2018-03-16

## 2018-03-16 VITALS
OXYGEN SATURATION: 96 % | WEIGHT: 124 LBS | HEIGHT: 69 IN | TEMPERATURE: 97.1 F | HEART RATE: 61 BPM | BODY MASS INDEX: 18.37 KG/M2 | SYSTOLIC BLOOD PRESSURE: 126 MMHG | DIASTOLIC BLOOD PRESSURE: 68 MMHG

## 2018-03-16 DIAGNOSIS — I10 ESSENTIAL HYPERTENSION: ICD-10-CM

## 2018-03-16 DIAGNOSIS — E03.8 OTHER SPECIFIED HYPOTHYROIDISM: ICD-10-CM

## 2018-03-16 DIAGNOSIS — E78.2 MIXED HYPERLIPIDEMIA: ICD-10-CM

## 2018-03-16 DIAGNOSIS — Z00.00 MEDICARE ANNUAL WELLNESS VISIT, SUBSEQUENT: Primary | ICD-10-CM

## 2018-03-16 DIAGNOSIS — K21.9 GASTROESOPHAGEAL REFLUX DISEASE WITHOUT ESOPHAGITIS: ICD-10-CM

## 2018-03-16 LAB
ALBUMIN SERPL-MCNC: 4.1 G/DL (ref 3.5–5)
ALBUMIN/GLOB SERPL: 1.6 G/DL (ref 1–2)
ALP SERPL-CCNC: 71 U/L (ref 38–126)
ALT SERPL-CCNC: 32 U/L (ref 13–69)
AST SERPL-CCNC: 30 U/L (ref 15–46)
BASOPHILS # BLD AUTO: 0.08 10*3/MM3 (ref 0–0.2)
BASOPHILS NFR BLD AUTO: 1.1 % (ref 0–2.5)
BILIRUB SERPL-MCNC: 1 MG/DL (ref 0.2–1.3)
BUN SERPL-MCNC: 12 MG/DL (ref 7–20)
BUN/CREAT SERPL: 15 (ref 6.3–21.9)
CALCIUM SERPL-MCNC: 9.3 MG/DL (ref 8.4–10.2)
CHLORIDE SERPL-SCNC: 99 MMOL/L (ref 98–107)
CHOLEST SERPL-MCNC: 144 MG/DL (ref 0–199)
CO2 SERPL-SCNC: 30 MMOL/L (ref 26–30)
CREAT SERPL-MCNC: 0.8 MG/DL (ref 0.6–1.3)
EOSINOPHIL # BLD AUTO: 0.09 10*3/MM3 (ref 0–0.7)
EOSINOPHIL NFR BLD AUTO: 1.2 % (ref 0–7)
ERYTHROCYTE [DISTWIDTH] IN BLOOD BY AUTOMATED COUNT: 13.9 % (ref 11.5–14.5)
GFR SERPLBLD CREATININE-BSD FMLA CKD-EPI: 110 ML/MIN/1.73
GFR SERPLBLD CREATININE-BSD FMLA CKD-EPI: 90 ML/MIN/1.73
GLOBULIN SER CALC-MCNC: 2.6 GM/DL
GLUCOSE SERPL-MCNC: 92 MG/DL (ref 74–98)
HCT VFR BLD AUTO: 42.3 % (ref 42–52)
HDLC SERPL-MCNC: 56 MG/DL (ref 40–60)
HGB BLD-MCNC: 13.9 G/DL (ref 14–18)
IMM GRANULOCYTES # BLD: 0.02 10*3/MM3 (ref 0–0.06)
IMM GRANULOCYTES NFR BLD: 0.3 % (ref 0–0.6)
LDLC SERPL CALC-MCNC: 75 MG/DL (ref 0–99)
LYMPHOCYTES # BLD AUTO: 1.1 10*3/MM3 (ref 0.6–3.4)
LYMPHOCYTES NFR BLD AUTO: 15 % (ref 10–50)
MCH RBC QN AUTO: 30.9 PG (ref 27–31)
MCHC RBC AUTO-ENTMCNC: 32.9 G/DL (ref 30–37)
MCV RBC AUTO: 94 FL (ref 80–94)
MONOCYTES # BLD AUTO: 0.45 10*3/MM3 (ref 0–0.9)
MONOCYTES NFR BLD AUTO: 6.1 % (ref 0–12)
NEUTROPHILS # BLD AUTO: 5.6 10*3/MM3 (ref 2–6.9)
NEUTROPHILS NFR BLD AUTO: 76.3 % (ref 37–80)
NRBC BLD AUTO-RTO: 0 /100 WBC (ref 0–0)
PLATELET # BLD AUTO: 314 10*3/MM3 (ref 130–400)
POTASSIUM SERPL-SCNC: 4.9 MMOL/L (ref 3.5–5.1)
PROT SERPL-MCNC: 6.7 G/DL (ref 6.3–8.2)
RBC # BLD AUTO: 4.5 10*6/MM3 (ref 4.7–6.1)
SODIUM SERPL-SCNC: 140 MMOL/L (ref 137–145)
T4 FREE SERPL-MCNC: 1.2 NG/DL (ref 0.78–2.19)
TRIGL SERPL-MCNC: 66 MG/DL
TSH SERPL DL<=0.005 MIU/L-ACNC: 3.02 MIU/ML (ref 0.47–4.68)
VLDLC SERPL CALC-MCNC: 13.2 MG/DL
WBC # BLD AUTO: 7.34 10*3/MM3 (ref 4.8–10.8)

## 2018-03-16 PROCEDURE — G0439 PPPS, SUBSEQ VISIT: HCPCS | Performed by: INTERNAL MEDICINE

## 2018-03-16 NOTE — PROGRESS NOTES
QUICK REFERENCE INFORMATION:  The ABCs of the Annual Wellness Visit    Subsequent Medicare Wellness Visit    HEALTH RISK ASSESSMENT    7/27/1925    Recent Hospitalizations:  No hospitalization(s) within the last year..        Current Medical Providers:  Patient Care Team:  Marilyn K Vermeesch, MD as PCP - General        Smoking Status:  History   Smoking Status   • Never Smoker   Smokeless Tobacco   • Never Used       Alcohol Consumption:  History   Alcohol Use No       Depression Screen:   PHQ-2/PHQ-9 Depression Screening 3/16/2018   Little interest or pleasure in doing things 0   Feeling down, depressed, or hopeless 0   Total Score 0       Health Habits and Functional and Cognitive Screening:  Functional & Cognitive Status 3/16/2018   Do you have difficulty preparing food and eating? No   Do you have difficulty bathing yourself, getting dressed or grooming yourself? Yes   Do you have difficulty using the toilet? No   Do you have difficulty moving around from place to place? No   Do you have trouble with steps or getting out of a bed or a chair? No   In the past year have you fallen or experienced a near fall? No   Current Diet Well Balanced Diet   Dental Exam Up to date   Eye Exam Up to date   Exercise (times per week) 0 times per week   Current Exercise Activities Include None   Do you need help using the phone?  No   Are you deaf or do you have serious difficulty hearing?  Yes   Do you need help with transportation? Yes   Do you need help shopping? No   Do you need help preparing meals?  No   Do you need help with housework?  No   Do you need help with laundry? No   Do you need help taking your medications? No   Do you need help managing money? No   Have you felt unusual stress, anger or loneliness in the last month? No   Who do you live with? Child   If you need help, do you have trouble finding someone available to you? No   Do you have difficulty concentrating, remembering or making decisions? No            Does the patient have evidence of cognitive impairment? No    Aspirin use counseling: Taking ASA appropriately as indicated      Recent Lab Results:  CMP:  Lab Results   Component Value Date     (H) 08/23/2016    BUN 8 (L) 03/08/2017    CREATININE 0.70 03/08/2017    EGFRIFNONA 106 03/08/2017    BCR 11.4 03/08/2017     03/08/2017    K 5.0 03/08/2017    CO2 31.0 03/08/2017    CALCIUM 9.5 03/08/2017    ALBUMIN 4.00 03/08/2017    LABIL2 1.7 03/08/2017    BILITOT 1.3 (H) 03/08/2017    ALKPHOS 73 03/08/2017    AST 25 03/08/2017    ALT 15 03/08/2017     Lipid Panel:  Lab Results   Component Value Date    CHOL 155 03/08/2017    TRIG 84 03/08/2017    HDL 54 03/08/2017    VLDL 10 10/31/2015     HbA1c:  Lab Results   Component Value Date    HGBA1C 5.5 10/30/2015       Visual Acuity:  No exam data present    Age-appropriate Screening Schedule:  Refer to the list below for future screening recommendations based on patient's age, sex and/or medical conditions. Orders for these recommended tests are listed in the plan section. The patient has been provided with a written plan.    Health Maintenance   Topic Date Due   • TDAP/TD VACCINES (1 - Tdap) 07/27/1944   • PNEUMOCOCCAL VACCINES (65+ LOW/MEDIUM RISK) (1 of 2 - PCV13) 07/27/1990   • ZOSTER VACCINE  07/06/2016   • LIPID PANEL  03/08/2018   • INFLUENZA VACCINE  Addressed        Subjective   History of Present Illness    Checo Micehle is a 89 y.o. male who presents for an Subsequent Wellness Visit.  PMH of HTN, HLD, GERD, constipation, hypothyroid, DJD knee, incontinence. He is taking a stool softener, 2 dulcolax, 2 MOM caps almost daily for his constipation. His BP is well controlled, usually less 130/70-he discontinued amlodipine a few months ago and BP remained well controlled-states he stopped med because he felt it was causing palpitations and he has not noted any since he stopped amlodipine. No GERD sxs.     The following portions of the  patient's history were reviewed and updated as appropriate: allergies, current medications, past family history, past medical history, past social history, past surgical history and problem list.    Outpatient Medications Prior to Visit   Medication Sig Dispense Refill   • aspirin 81 MG tablet Take  by mouth daily.     • atenolol (TENORMIN) 50 MG tablet TAKE 2 TABLETS DAILY 180 tablet 1   • atorvastatin (LIPITOR) 20 MG tablet TAKE 1 TABLET DAILY 90 tablet 1   • candesartan (ATACAND) 4 MG tablet TAKE 1 TABLET DAILY FOR BLOOD PRESSURE 90 tablet 1   • clonazePAM (KlonoPIN) 1 MG tablet Take 1 tablet by mouth Daily. 90 tablet 0   • levothyroxine (SYNTHROID, LEVOTHROID) 25 MCG tablet TAKE ONE AND ONE HALF TABLET DAILY 135 tablet 3   • mirtazapine (REMERON) 30 MG tablet TAKE 1 TABLET EVERY NIGHT 90 tablet 1   • Multiple Vitamins-Minerals (CENTRUM ADULTS PO) Take  by mouth daily.     • mupirocin (BACTROBAN) 2 % ointment Apply  topically 2 (Two) Times a Day. 15 g 0   • Omega-3 Fatty Acids (OMEGA 3 PO) Take  by mouth.     • Polyethylene Glycol 3350 granules Take  by mouth 2 (two) times a day.     • tolterodine LA (DETROL LA) 4 MG 24 hr capsule TAKE 1 CAPSULE DAILY 90 capsule 3   • amLODIPine (NORVASC) 5 MG tablet Take 1 tablet by mouth Daily. 30 tablet 11   • BETA CAROTENE PO Take  by mouth daily.       No facility-administered medications prior to visit.        Patient Active Problem List   Diagnosis   • Gastroesophageal reflux disease without esophagitis   • Hyperlipidemia   • Hypertension   • Hypothyroidism   • Male urinary stress incontinence   • Osteoarthritis of knee   • Temporary cerebral vascular dysfunction   • Slow transit constipation   • Constipation   • Frequent use of laxatives   • Chronic pain of right knee   • Medicare annual wellness visit, subsequent       Advance Care Planning:  has an advance directive - a copy HAS NOT been provided. Have asked the patient to send this to us to add to  record.    Identification of Risk Factors:  Risk factors include: unhealthy diet, cardiovascular risk, inactivity and hearing limitations.    Review of Systems   Constitutional: Negative.    HENT: Positive for hearing loss.         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    Cardiovascular: Negative.    Gastrointestinal: Positive for constipation.   Genitourinary: Negative for difficulty urinating and frequency.   Musculoskeletal: Positive for arthralgias.        Right knee pain   Skin: Negative.    Allergic/Immunologic: Positive for environmental allergies.   Neurological: Negative.    Hematological: Negative.    Psychiatric/Behavioral: Negative.        Compared to one year ago, the patient feels his physical health is worse.  Compared to one year ago, the patient feels his mental health is the same.    Objective     Physical Exam   Constitutional: He is oriented to person, place, and time. He appears well-developed and well-nourished.   Pleasant elderly gentleman in no apparent distress   HENT:   Head: Normocephalic and atraumatic.   Right Ear: External ear normal.   Left Ear: External ear normal.   Mouth/Throat: Oropharynx is clear and moist.   Bilateral hearing aids in place, these were removed and tympanic membranes are normal, poor dentition noted   Eyes: Conjunctivae and EOM are normal. Pupils are equal, round, and reactive to light. Right eye exhibits no discharge. Left eye exhibits no discharge.   Neck: Normal range of motion. Neck supple. No thyromegaly present.   Cardiovascular: Normal rate, regular rhythm and intact distal pulses.    Murmur heard.  Systolic murmur grade 1/6 at left sternal border   Pulmonary/Chest: Effort normal and breath sounds normal. No respiratory distress.   Abdominal: Soft. Bowel sounds are normal.   Musculoskeletal: He exhibits no edema.   Thoracic kyphosis noted, patient ambulates with a slight shuffle in his gait  "  Lymphadenopathy:     He has no cervical adenopathy.   Neurological: He is alert and oriented to person, place, and time. No cranial nerve deficit.   Skin:   Several AK and SK lesions noted over the face, upper extremities and back   Psychiatric: He has a normal mood and affect. Judgment normal.   Slightly confused today with directions regarding follow-up and labs, had to redirect him as how to get out of clinic   Nursing note and vitals reviewed.      Vitals:    03/16/18 0835   BP: 126/68   Pulse: 61   Temp: 97.1 °F (36.2 °C)   SpO2: 96%   Weight: 56.2 kg (124 lb)   Height: 175.3 cm (69\")   PainSc: 0-No pain       Body mass index is 18.31 kg/m².  Discussed the patient's BMI with him. BMI is within normal parameters. No follow-up required.    Assessment/Plan   Patient Self-Management and Personalized Health Advice  The patient has been provided with information about: exercise and supplements and preventive services including:   · Diabetes screening, see lab orders.    Visit Diagnoses:    ICD-10-CM ICD-9-CM   1. Medicare annual wellness visit, subsequent Z00.00 V70.0   2. Other specified hypothyroidism E03.8 244.8   3. Gastroesophageal reflux disease without esophagitis K21.9 530.81   4. Essential hypertension I10 401.9   5. Mixed hyperlipidemia E78.2 272.2       Orders Placed This Encounter   Procedures   • Comprehensive Metabolic Panel   • Lipid Panel   • T4, Free   • TSH   • CBC & Differential     Order Specific Question:   Manual Differential     Answer:   No       Outpatient Encounter Prescriptions as of 3/16/2018   Medication Sig Dispense Refill   • aspirin 81 MG tablet Take  by mouth daily.     • atenolol (TENORMIN) 50 MG tablet TAKE 2 TABLETS DAILY 180 tablet 1   • atorvastatin (LIPITOR) 20 MG tablet TAKE 1 TABLET DAILY 90 tablet 1   • candesartan (ATACAND) 4 MG tablet TAKE 1 TABLET DAILY FOR BLOOD PRESSURE 90 tablet 1   • clonazePAM (KlonoPIN) 1 MG tablet Take 1 tablet by mouth Daily. 90 tablet 0   • " levothyroxine (SYNTHROID, LEVOTHROID) 25 MCG tablet TAKE ONE AND ONE HALF TABLET DAILY 135 tablet 3   • mirtazapine (REMERON) 30 MG tablet TAKE 1 TABLET EVERY NIGHT 90 tablet 1   • Multiple Vitamins-Minerals (CENTRUM ADULTS PO) Take  by mouth daily.     • mupirocin (BACTROBAN) 2 % ointment Apply  topically 2 (Two) Times a Day. 15 g 0   • Omega-3 Fatty Acids (OMEGA 3 PO) Take  by mouth.     • Polyethylene Glycol 3350 granules Take  by mouth 2 (two) times a day.     • tolterodine LA (DETROL LA) 4 MG 24 hr capsule TAKE 1 CAPSULE DAILY 90 capsule 3   • [DISCONTINUED] amLODIPine (NORVASC) 5 MG tablet Take 1 tablet by mouth Daily. 30 tablet 11   • [DISCONTINUED] BETA CAROTENE PO Take  by mouth daily.       No facility-administered encounter medications on file as of 3/16/2018.        Reviewed use of high risk medication in the elderly: yes  Reviewed for potential of harmful drug interactions in the elderly: yes     Bring copy of living will for chart  Labs as noted  Take vit D 1000 u a day  Monitor BP since off amlodipine  Exercise as tolerated 5 days a week  Some concern regarding diet.  Patient states his wife does not cook for him, she cooks for herself and he is on his own.  Sounds as though he eats a lot of frozen dinners and canned foods    Follow Up:  Return in about 4 months (around 7/16/2018) for Next scheduled follow up.     An After Visit Summary and PPPS with all of these plans were given to the patient.

## 2018-03-19 ENCOUNTER — TELEPHONE (OUTPATIENT)
Dept: INTERNAL MEDICINE | Facility: CLINIC | Age: 83
End: 2018-03-19

## 2018-03-19 NOTE — TELEPHONE ENCOUNTER
----- Message from Marilyn K Vermeesch, MD sent at 3/13/2018  1:19 PM EDT -----  Please tell patient all stools are negative for blood

## 2018-03-29 RX ORDER — CANDESARTAN 4 MG/1
TABLET ORAL
Qty: 90 TABLET | Refills: 1 | Status: SHIPPED | OUTPATIENT
Start: 2018-03-29 | End: 2018-07-17 | Stop reason: SDUPTHER

## 2018-05-07 NOTE — TELEPHONE ENCOUNTER
PATIENT ONLY HAS 2 PILLS LEFT. HE WOULD LIKE IT TO GO TO [facility] THE [place] [employer] [place] IF APPROVED.

## 2018-05-08 RX ORDER — CLONAZEPAM 1 MG/1
1 TABLET ORAL DAILY
Qty: 90 TABLET | Refills: 0 | Status: SHIPPED | OUTPATIENT
Start: 2018-05-08 | End: 2018-08-02 | Stop reason: SDUPTHER

## 2018-06-11 RX ORDER — LEVOTHYROXINE SODIUM 0.03 MG/1
TABLET ORAL
Qty: 135 TABLET | Refills: 0 | Status: SHIPPED | OUTPATIENT
Start: 2018-06-11 | End: 2018-09-09 | Stop reason: SDUPTHER

## 2018-06-18 RX ORDER — ATENOLOL 50 MG/1
TABLET ORAL
Qty: 180 TABLET | Refills: 0 | Status: SHIPPED | OUTPATIENT
Start: 2018-06-18 | End: 2018-09-15 | Stop reason: SDUPTHER

## 2018-07-17 ENCOUNTER — OFFICE VISIT (OUTPATIENT)
Dept: INTERNAL MEDICINE | Facility: CLINIC | Age: 83
End: 2018-07-17

## 2018-07-17 VITALS
HEIGHT: 65 IN | BODY MASS INDEX: 20.66 KG/M2 | DIASTOLIC BLOOD PRESSURE: 62 MMHG | OXYGEN SATURATION: 95 % | SYSTOLIC BLOOD PRESSURE: 126 MMHG | HEART RATE: 55 BPM | WEIGHT: 124 LBS | TEMPERATURE: 97.5 F

## 2018-07-17 DIAGNOSIS — E03.8 OTHER SPECIFIED HYPOTHYROIDISM: ICD-10-CM

## 2018-07-17 DIAGNOSIS — E78.2 MIXED HYPERLIPIDEMIA: ICD-10-CM

## 2018-07-17 DIAGNOSIS — I10 ESSENTIAL HYPERTENSION: Primary | ICD-10-CM

## 2018-07-17 DIAGNOSIS — G89.29 CHRONIC PAIN OF RIGHT KNEE: ICD-10-CM

## 2018-07-17 DIAGNOSIS — M25.561 CHRONIC PAIN OF RIGHT KNEE: ICD-10-CM

## 2018-07-17 DIAGNOSIS — K59.00 CONSTIPATION, UNSPECIFIED CONSTIPATION TYPE: ICD-10-CM

## 2018-07-17 PROCEDURE — 99214 OFFICE O/P EST MOD 30 MIN: CPT | Performed by: INTERNAL MEDICINE

## 2018-07-17 RX ORDER — ATORVASTATIN CALCIUM 20 MG/1
20 TABLET, FILM COATED ORAL DAILY
Qty: 90 TABLET | Refills: 1 | Status: SHIPPED | OUTPATIENT
Start: 2018-07-17 | End: 2019-01-13 | Stop reason: SDUPTHER

## 2018-07-17 RX ORDER — CANDESARTAN 4 MG/1
4 TABLET ORAL DAILY
Qty: 90 TABLET | Refills: 1 | Status: SHIPPED | OUTPATIENT
Start: 2018-07-17 | End: 2019-03-25 | Stop reason: SDUPTHER

## 2018-07-17 RX ORDER — MIRTAZAPINE 30 MG/1
30 TABLET, FILM COATED ORAL NIGHTLY
Qty: 90 TABLET | Refills: 1 | Status: SHIPPED | OUTPATIENT
Start: 2018-07-17 | End: 2018-12-24 | Stop reason: SDUPTHER

## 2018-07-17 NOTE — PROGRESS NOTES
"Chief Complaint   Patient presents with   • Follow-up     4 months for GERD, HLD, HTN and Hypothyroidism. No new complaints.     Subjective   Checo Michele is a 89 y.o. male.     Here today for follow-up of HTN, HLD, GERD, DJD, anxiety, hypothyroid and incontinence.   HTN/HLD- BP is well controlled today.  HR is slightly low.  Home BP is 120-140/60-70.  He denies CP, SOA, palpitations or edema.  He is compliant with medications  Decreased vision- he has no vision in left eye.  He has macular degeneration in right eye.  He is no longer driving  GERD- he denies any sxs, no meds  Constipation- he continues to have problems.  He is no longer taking miralax.  He is taking a mixture of laxatives daily  Anxiety- he denies any problems.  He is sleeping well at night  Hypothyroid- he takes his med alone before all other meds  Right knee DJD- he takes tylenol prn for this         The following portions of the patient's history were reviewed and updated as appropriate: allergies, current medications, past family history, past medical history, past social history, past surgical history and problem list.    Review of Systems   HENT: Positive for hearing loss.    Respiratory: Negative for shortness of breath.    Cardiovascular: Negative for chest pain, palpitations and leg swelling.   Gastrointestinal: Positive for constipation.   Musculoskeletal: Positive for arthralgias. Negative for gait problem and myalgias.   Neurological: Negative.    Psychiatric/Behavioral: Negative for dysphoric mood and sleep disturbance. The patient is not nervous/anxious.    All other systems reviewed and are negative.      Objective   /62   Pulse 55   Temp 97.5 °F (36.4 °C)   Ht 165.1 cm (65\")   Wt 56.2 kg (124 lb)   SpO2 95%   BMI 20.63 kg/m²   Body mass index is 20.63 kg/m².  Physical Exam   Constitutional: He is oriented to person, place, and time. He appears well-developed and well-nourished.   HENT:   Head: Normocephalic and " atraumatic.   Mouth/Throat: Oropharynx is clear and moist.   Poor dentition  Hearing aides noted   Eyes: Conjunctivae and EOM are normal. Pupils are equal, round, and reactive to light.   Neck: Normal range of motion. Neck supple. No thyromegaly present.   Cardiovascular: Normal rate, regular rhythm, normal heart sounds and intact distal pulses.    No murmur heard.  Occasional ectopic beat, HR 60   Pulmonary/Chest: Effort normal and breath sounds normal. No respiratory distress.   Abdominal: Soft. Bowel sounds are normal.   Musculoskeletal: He exhibits no edema.   Scoliosis of thoracic spine noted  Right knee medial bony prominence, crepitus   Lymphadenopathy:     He has no cervical adenopathy.   Neurological: He is alert and oriented to person, place, and time. No cranial nerve deficit.   Psychiatric: He has a normal mood and affect. His behavior is normal. Judgment and thought content normal.   Nursing note and vitals reviewed.      Assessment/Plan   Checo Michele is here today and the following problems have been addressed:      Checo was seen today for follow-up.    Diagnoses and all orders for this visit:    Essential hypertension    Mixed hyperlipidemia    Constipation, unspecified constipation type    Other specified hypothyroidism    Chronic pain of right knee    Other orders  -     candesartan (ATACAND) 4 MG tablet; Take 1 tablet by mouth Daily. for blood pressure  -     mirtazapine (REMERON) 30 MG tablet; Take 1 tablet by mouth Every Night.  -     atorvastatin (LIPITOR) 20 MG tablet; Take 1 tablet by mouth Daily.    Follow heart healthy/low salt diet  Avoid processed foods  Monitor blood pressure as discussed  Exercise as tolerated up to 30 minutes 5 days per week  Take all medications as prescribed  Labs are UTD  Encouraged pt to take miralax one capful daily  Recommend tylenol ES 2 tabs BID for DJD pain  No complaints of anxiety or sleep with use of mirtazapine    RTC 6 mo, labs then    Please  note that portions of this note were completed with a voice recognition program.  Efforts were made to edit dictation, but occasionally words are mistranscribed.

## 2018-08-02 RX ORDER — CLONAZEPAM 1 MG/1
1 TABLET ORAL DAILY
Qty: 90 TABLET | Refills: 0 | Status: SHIPPED | OUTPATIENT
Start: 2018-08-02 | End: 2018-11-01 | Stop reason: SDUPTHER

## 2018-09-10 RX ORDER — LEVOTHYROXINE SODIUM 0.03 MG/1
TABLET ORAL
Qty: 135 TABLET | Refills: 2 | Status: SHIPPED | OUTPATIENT
Start: 2018-09-10 | End: 2019-03-25 | Stop reason: SDUPTHER

## 2018-09-17 RX ORDER — ATENOLOL 50 MG/1
TABLET ORAL
Qty: 180 TABLET | Refills: 1 | Status: SHIPPED | OUTPATIENT
Start: 2018-09-17 | End: 2019-03-16 | Stop reason: SDUPTHER

## 2018-11-01 RX ORDER — CLONAZEPAM 1 MG/1
1 TABLET ORAL DAILY
Qty: 90 TABLET | Refills: 0 | Status: SHIPPED | OUTPATIENT
Start: 2018-11-01 | End: 2019-01-28 | Stop reason: SDUPTHER

## 2018-11-01 NOTE — TELEPHONE ENCOUNTER
Patient came in office to request a refill. He will be out of medicine on Saturday.  Please call and advise patient.   Confirmed Pharmacy

## 2018-11-26 RX ORDER — TOLTERODINE 4 MG/1
CAPSULE, EXTENDED RELEASE ORAL
Qty: 90 CAPSULE | Refills: 3 | Status: SHIPPED | OUTPATIENT
Start: 2018-11-26 | End: 2019-09-12 | Stop reason: SDUPTHER

## 2018-12-24 RX ORDER — MIRTAZAPINE 30 MG/1
TABLET, FILM COATED ORAL
Qty: 90 TABLET | Refills: 1 | Status: SHIPPED | OUTPATIENT
Start: 2018-12-24 | End: 2019-03-25 | Stop reason: SDUPTHER

## 2019-01-14 ENCOUNTER — OFFICE VISIT (OUTPATIENT)
Dept: UROLOGY | Facility: CLINIC | Age: 84
End: 2019-01-14

## 2019-01-14 VITALS
TEMPERATURE: 98.2 F | DIASTOLIC BLOOD PRESSURE: 78 MMHG | OXYGEN SATURATION: 94 % | HEART RATE: 68 BPM | SYSTOLIC BLOOD PRESSURE: 110 MMHG

## 2019-01-14 DIAGNOSIS — N40.0 BENIGN PROSTATIC HYPERPLASIA, UNSPECIFIED WHETHER LOWER URINARY TRACT SYMPTOMS PRESENT: Primary | ICD-10-CM

## 2019-01-14 DIAGNOSIS — R35.0 URINARY FREQUENCY: ICD-10-CM

## 2019-01-14 LAB
BILIRUB BLD-MCNC: NEGATIVE MG/DL
CLARITY, POC: CLEAR
COLOR UR: YELLOW
GLUCOSE UR STRIP-MCNC: NEGATIVE MG/DL
KETONES UR QL: NEGATIVE
LEUKOCYTE EST, POC: NEGATIVE
NITRITE UR-MCNC: NEGATIVE MG/ML
PH UR: 7 [PH] (ref 5–8)
PROT UR STRIP-MCNC: NEGATIVE MG/DL
RBC # UR STRIP: NEGATIVE /UL
SP GR UR: 1.02 (ref 1–1.03)
UROBILINOGEN UR QL: NORMAL

## 2019-01-14 PROCEDURE — 99213 OFFICE O/P EST LOW 20 MIN: CPT | Performed by: UROLOGY

## 2019-01-14 PROCEDURE — 81003 URINALYSIS AUTO W/O SCOPE: CPT | Performed by: UROLOGY

## 2019-01-14 RX ORDER — ATORVASTATIN CALCIUM 20 MG/1
TABLET, FILM COATED ORAL
Qty: 90 TABLET | Refills: 1 | Status: SHIPPED | OUTPATIENT
Start: 2019-01-14 | End: 2019-07-13 | Stop reason: SDUPTHER

## 2019-01-14 RX ORDER — HEPATITIS A VACCINE 1440 [IU]/ML
INJECTION, SUSPENSION INTRAMUSCULAR
Refills: 0 | COMMUNITY
Start: 2018-10-24 | End: 2019-03-25

## 2019-01-14 NOTE — PROGRESS NOTES
Chief Complaint  BPH/Frequency        OFELIA Michele is a 89 y.o. male who returns today for annual checkup some 30 years after TURP for difficulty voiding.  He currently has no problems with clear urine and no complaints.  He wears a diaper for incontinence this developed for the last few years but it is not damp today.    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)    • Sleep apnea    • Spermatocele    • Syncope, near    • URTI (acute upper respiratory infection)        Past Surgical History  Past Surgical History:   Procedure Laterality Date   • CHOLECYSTECTOMY  20 years ago   • COLONOSCOPY  15-20 years ago   • COLONOSCOPY W/ POLYPECTOMY      Dr Toledo   • CYSTOSCOPY TRANSURETHRAL RESECTION OF PROSTATE     • PROSTATECTOMY      non cancer, Dr Toledo   • TONSILLECTOMY  1931       Medications  has a current medication list which includes the following prescription(s): aspirin, atenolol, atorvastatin, candesartan, clonazepam, havrix, levothyroxine, mirtazapine, multiple vitamins-minerals, mupirocin, omega-3 fatty acids, polyethylene glycol 3350, and tolterodine la.      Allergies  Allergies   Allergen Reactions   • Ace Inhibitors    • Amoxicillin-Pot Clavulanate    • Cefaclor    • Hydrochlorothiazide    • Nitrofurantoin        Social History  Social History     Social History Narrative   • Not on file       Family History  He has no family history of bladder or kidney cancer  He has no family history of kidney stones      AUA Symptom Score:      Review of Systems  Review of Systems    Physical Exam  Physical Exam   Constitutional: He is oriented to person, place, and time. He  appears well-developed and well-nourished.   HENT:   Head: Normocephalic and atraumatic.   Neck: Normal range of motion.   Pulmonary/Chest: Effort normal. No respiratory distress.   Abdominal: Soft. He exhibits no distension and no mass. There is no tenderness. No hernia.   Genitourinary: Rectum normal and prostate normal.   Musculoskeletal: Normal range of motion.   Lymphadenopathy:     He has no cervical adenopathy.   Neurological: He is alert and oriented to person, place, and time.   Skin: Skin is warm and dry.   Psychiatric: He has a normal mood and affect. His behavior is normal.   Vitals reviewed.      Labs Recent and today in the office:  Results for orders placed or performed in visit on 01/14/19   POC Urinalysis Dipstick, Automated   Result Value Ref Range    Color Yellow Yellow, Straw, Dark Yellow, Izzy    Clarity, UA Clear Clear    Specific Gravity  1.020 1.005 - 1.030    pH, Urine 7.0 5.0 - 8.0    Leukocytes Negative Negative    Nitrite, UA Negative Negative    Protein, POC Negative Negative mg/dL    Glucose, UA Negative Negative, 1000 mg/dL (3+) mg/dL    Ketones, UA Negative Negative    Urobilinogen, UA Normal Normal    Bilirubin Negative Negative    Blood, UA Negative Negative         Assessment & Plan  BPH with outlet obstruction: No trouble voiding 30+ years after TURP.  Digital rectal exam reveals postsurgical changes but no obvious cancer.  He is a candidate for palliative therapy only.  He'll continue his Detrol as provided by his primary care provider and return on an annual basis.

## 2019-01-28 RX ORDER — CLONAZEPAM 1 MG/1
1 TABLET ORAL DAILY
Qty: 90 TABLET | Refills: 0 | Status: SHIPPED | OUTPATIENT
Start: 2019-01-28 | End: 2019-04-30 | Stop reason: SDUPTHER

## 2019-01-28 NOTE — TELEPHONE ENCOUNTER
He was last seen in July, he is due for appointment now not in March.  Perhaps you can set him up for Medicare wellness sometime next month.

## 2019-03-16 RX ORDER — ATENOLOL 50 MG/1
TABLET ORAL
Qty: 180 TABLET | Refills: 1 | Status: SHIPPED | OUTPATIENT
Start: 2019-03-16 | End: 2019-09-12 | Stop reason: SDUPTHER

## 2019-03-25 ENCOUNTER — OFFICE VISIT (OUTPATIENT)
Dept: INTERNAL MEDICINE | Facility: CLINIC | Age: 84
End: 2019-03-25

## 2019-03-25 VITALS
OXYGEN SATURATION: 94 % | WEIGHT: 124.8 LBS | SYSTOLIC BLOOD PRESSURE: 161 MMHG | HEIGHT: 65 IN | DIASTOLIC BLOOD PRESSURE: 64 MMHG | TEMPERATURE: 96.8 F | RESPIRATION RATE: 16 BRPM | HEART RATE: 78 BPM | BODY MASS INDEX: 20.79 KG/M2

## 2019-03-25 DIAGNOSIS — I10 ESSENTIAL HYPERTENSION: ICD-10-CM

## 2019-03-25 DIAGNOSIS — K21.9 GASTROESOPHAGEAL REFLUX DISEASE WITHOUT ESOPHAGITIS: ICD-10-CM

## 2019-03-25 DIAGNOSIS — M17.11 PRIMARY OSTEOARTHRITIS OF RIGHT KNEE: ICD-10-CM

## 2019-03-25 DIAGNOSIS — E03.8 OTHER SPECIFIED HYPOTHYROIDISM: ICD-10-CM

## 2019-03-25 DIAGNOSIS — Z00.00 MEDICARE ANNUAL WELLNESS VISIT, SUBSEQUENT: Primary | ICD-10-CM

## 2019-03-25 DIAGNOSIS — E78.2 MIXED HYPERLIPIDEMIA: ICD-10-CM

## 2019-03-25 DIAGNOSIS — N39.3 MALE URINARY STRESS INCONTINENCE: ICD-10-CM

## 2019-03-25 PROCEDURE — 99397 PER PM REEVAL EST PAT 65+ YR: CPT | Performed by: INTERNAL MEDICINE

## 2019-03-25 PROCEDURE — G0439 PPPS, SUBSEQ VISIT: HCPCS | Performed by: INTERNAL MEDICINE

## 2019-03-25 RX ORDER — LEVOTHYROXINE SODIUM 0.03 MG/1
37.5 TABLET ORAL DAILY
Qty: 135 TABLET | Refills: 2 | Status: SHIPPED | OUTPATIENT
Start: 2019-03-25 | End: 2019-09-12 | Stop reason: SDUPTHER

## 2019-03-25 RX ORDER — MIRTAZAPINE 30 MG/1
30 TABLET, FILM COATED ORAL NIGHTLY
Qty: 90 TABLET | Refills: 1 | Status: SHIPPED | OUTPATIENT
Start: 2019-03-25 | End: 2019-09-12 | Stop reason: SDUPTHER

## 2019-03-25 RX ORDER — CANDESARTAN 4 MG/1
4 TABLET ORAL DAILY
Qty: 90 TABLET | Refills: 1 | Status: SHIPPED | OUTPATIENT
Start: 2019-03-25 | End: 2019-09-12 | Stop reason: SDUPTHER

## 2019-03-25 NOTE — PROGRESS NOTES
QUICK REFERENCE INFORMATION:  The ABCs of the Annual Wellness Visit    Subsequent Medicare Wellness Visit    HEALTH RISK ASSESSMENT    7/27/1925    Recent Hospitalizations:  No hospitalization(s) within the last year..        Current Medical Providers:  Patient Care Team:  Vermeesch, Marilyn K, MD as PCP - General        Smoking Status:  Social History     Tobacco Use   Smoking Status Never Smoker   Smokeless Tobacco Never Used       Alcohol Consumption:  Social History     Substance and Sexual Activity   Alcohol Use No       Depression Screen:   PHQ-2/PHQ-9 Depression Screening 3/25/2019   Little interest or pleasure in doing things 0   Feeling down, depressed, or hopeless 0   Total Score 0       Health Habits and Functional and Cognitive Screening:  Functional & Cognitive Status 3/25/2019   Do you have difficulty preparing food and eating? No   Do you have difficulty bathing yourself, getting dressed or grooming yourself? No   Do you have difficulty using the toilet? No   Do you have difficulty moving around from place to place? No   Do you have trouble with steps or getting out of a bed or a chair? No   In the past year have you fallen or experienced a near fall? No   Current Diet Well Balanced Diet   Dental Exam Up to date   Eye Exam Up to date   Exercise (times per week) 0 times per week   Current Exercise Activities Include None   Do you need help using the phone?  Yes   Are you deaf or do you have serious difficulty hearing?  Yes   Do you need help with transportation? Yes   Do you need help shopping? No   Do you need help preparing meals?  No   Do you need help with housework?  No   Do you need help with laundry? No   Do you need help taking your medications? No   Do you need help managing money? No   Do you ever drive or ride in a car without wearing a seat belt? No   Have you felt unusual stress, anger or loneliness in the last month? Yes   Who do you live with? Spouse   If you need help, do you have  trouble finding someone available to you? No   Do you have difficulty concentrating, remembering or making decisions? No           Does the patient have evidence of cognitive impairment? No    Aspirin use counseling: Taking ASA appropriately as indicated      Recent Lab Results:  CMP:  Lab Results   Component Value Date    GLU 92 03/16/2018    BUN 12 03/16/2018    CREATININE 0.80 03/16/2018    EGFRIFNONA 90 03/16/2018    EGFRIFAFRI 110 03/16/2018    BCR 15.0 03/16/2018     03/16/2018    K 4.9 03/16/2018    CO2 30.0 03/16/2018    CALCIUM 9.3 03/16/2018    PROTENTOTREF 6.7 03/16/2018    ALBUMIN 4.10 03/16/2018    LABGLOBREF 2.6 03/16/2018    LABIL2 1.6 03/16/2018    BILITOT 1.0 03/16/2018    ALKPHOS 71 03/16/2018    AST 30 03/16/2018    ALT 32 03/16/2018     Lipid Panel:  Lab Results   Component Value Date    CHOL 155 03/08/2017    TRIG 66 03/16/2018    HDL 56 03/16/2018    VLDL 13.2 03/16/2018     HbA1c:  Lab Results   Component Value Date    HGBA1C 5.5 10/30/2015       Visual Acuity:  No exam data present    Age-appropriate Screening Schedule:  Refer to the list below for future screening recommendations based on patient's age, sex and/or medical conditions. Orders for these recommended tests are listed in the plan section. The patient has been provided with a written plan.    Health Maintenance   Topic Date Due   • ZOSTER VACCINE (1 of 2) 07/27/1975   • PNEUMOCOCCAL VACCINES (65+ LOW/MEDIUM RISK) (1 of 2 - PCV13) 07/27/1990   • LIPID PANEL  03/16/2019   • INFLUENZA VACCINE  Completed   • TDAP/TD VACCINES  Discontinued        Subjective   History of Present Illness    Checo Michele is a 89 y.o. male who presents for an Subsequent Wellness Visit. PMH of HTN, HLD, GERD, hypothyroid, DJD.  He is having a lot of pain in right knee currently.  BP is elevated today.  He missed his BP meds last night and this morning.  No current GERD sxs. He does go out to eat fast food quite a bit.  He is compliant with  medications.     The following portions of the patient's history were reviewed and updated as appropriate: allergies, current medications, past family history, past medical history, past social history, past surgical history and problem list.    Outpatient Medications Prior to Visit   Medication Sig Dispense Refill   • aspirin 81 MG tablet Take  by mouth daily.     • atenolol (TENORMIN) 50 MG tablet TAKE 2 TABLETS DAILY 180 tablet 1   • atorvastatin (LIPITOR) 20 MG tablet TAKE 1 TABLET DAILY 90 tablet 1   • candesartan (ATACAND) 4 MG tablet Take 1 tablet by mouth Daily. for blood pressure 90 tablet 1   • clonazePAM (KlonoPIN) 1 MG tablet Take 1 tablet by mouth Daily. 90 tablet 0   • levothyroxine (SYNTHROID, LEVOTHROID) 25 MCG tablet TAKE ONE AND ONE-HALF TABLETS DAILY 135 tablet 2   • mirtazapine (REMERON) 30 MG tablet TAKE 1 TABLET EVERY NIGHT 90 tablet 1   • Multiple Vitamins-Minerals (CENTRUM ADULTS PO) Take  by mouth daily.     • mupirocin (BACTROBAN) 2 % ointment Apply  topically 2 (Two) Times a Day. 15 g 0   • Omega-3 Fatty Acids (OMEGA 3 PO) Take  by mouth.     • Polyethylene Glycol 3350 granules Take  by mouth 2 (two) times a day.     • tolterodine LA (DETROL LA) 4 MG 24 hr capsule TAKE 1 CAPSULE DAILY 90 capsule 3   • HAVRIX 1440 EL U/ML vaccine inject 1 milliliter intramuscularly  0     No facility-administered medications prior to visit.        Patient Active Problem List   Diagnosis   • Gastroesophageal reflux disease without esophagitis   • Hyperlipidemia   • Hypertension   • Hypothyroidism   • Male urinary stress incontinence   • Osteoarthritis of knee   • Temporary cerebral vascular dysfunction   • Constipation   • Frequent use of laxatives   • Chronic pain of right knee   • Medicare annual wellness visit, subsequent       Advance Care Planning:  has an advance directive - a copy HAS NOT been provided. Have asked the patient to send this to us to add to record.    Identification of Risk  Factors:  Risk factors include: unhealthy diet, cardiovascular risk, inactivity, increased fall risk, chronic pain, caretaker stress, hearing limitations and incontinence.    Review of Systems   Constitutional: Negative.    HENT: Positive for hearing loss.    Eyes: Negative.    Respiratory: Negative.    Cardiovascular: Negative.    Gastrointestinal: Positive for constipation.   Endocrine: Negative.    Genitourinary:        Urine incontinence   Musculoskeletal: Positive for arthralgias.   Allergic/Immunologic: Negative.    Neurological: Negative.    Hematological: Negative.    Psychiatric/Behavioral: Positive for sleep disturbance. The patient is nervous/anxious.        Compared to one year ago, the patient feels his physical health is the same.  Compared to one year ago, the patient feels his mental health is the same.    Objective     Physical Exam   Constitutional: He is oriented to person, place, and time. He appears well-developed and well-nourished. No distress.   Pleasant elderly gentleman, ambulates with slight limp due to arthritis of knees   HENT:   Head: Normocephalic and atraumatic.   Right Ear: External ear normal.   Left Ear: External ear normal.   Mouth/Throat: Oropharynx is clear and moist. No oropharyngeal exudate.   Poor dentition noted, bilateral hearing aids in place, removed, tympanic membranes thickened and gray due to chronic aging   Eyes: Conjunctivae and EOM are normal. Pupils are equal, round, and reactive to light.   Arcus senilis noted   Neck: Normal range of motion. Neck supple. No thyromegaly present.   Cardiovascular: Normal rate, regular rhythm, normal heart sounds and intact distal pulses.   No murmur heard.  No carotid bruits  Occasional ectopic beat noted   Pulmonary/Chest: Effort normal and breath sounds normal. No respiratory distress. He has no wheezes.   Abdominal: Soft. Bowel sounds are normal. He exhibits no distension. There is no tenderness.   Musculoskeletal: Normal range  "of motion. He exhibits no edema.   Knees with bilateral crepitus   Lymphadenopathy:     He has no cervical adenopathy.   Neurological: He is alert and oriented to person, place, and time. No cranial nerve deficit. Coordination normal.   Psychiatric: He has a normal mood and affect. His behavior is normal. Judgment and thought content normal.   Nursing note and vitals reviewed.      Vitals:    03/25/19 1006   BP: 161/64   Pulse: 78   Resp: 16   Temp: 96.8 °F (36 °C)   SpO2: 94%   Weight: 56.6 kg (124 lb 12.8 oz)   Height: 165.1 cm (65\")       Patient's Body mass index is 20.77 kg/m². BMI is within normal parameters. No follow-up required..      Assessment/Plan   Patient Self-Management and Personalized Health Advice  The patient has been provided with information about: diet, exercise and fall prevention and preventive services including:   · Diabetes screening, see lab orders, Exercise counseling provided, Hep A and shingles vaccine at pharmacy.    Visit Diagnoses:    ICD-10-CM ICD-9-CM   1. Medicare annual wellness visit, subsequent Z00.00 V70.0   2. Essential hypertension I10 401.9   3. Mixed hyperlipidemia E78.2 272.2   4. Gastroesophageal reflux disease without esophagitis K21.9 530.81   5. Other specified hypothyroidism E03.8 244.8   6. Primary osteoarthritis of right knee M17.11 715.16   7. Male urinary stress incontinence N39.3 788.32       Orders Placed This Encounter   Procedures   • Comprehensive Metabolic Panel   • Lipid Panel With / Chol / HDL Ratio   • TSH   • T4, Free   • CBC & Differential     Order Specific Question:   Manual Differential     Answer:   No       Outpatient Encounter Medications as of 3/25/2019   Medication Sig Dispense Refill   • aspirin 81 MG tablet Take  by mouth daily.     • atenolol (TENORMIN) 50 MG tablet TAKE 2 TABLETS DAILY 180 tablet 1   • atorvastatin (LIPITOR) 20 MG tablet TAKE 1 TABLET DAILY 90 tablet 1   • candesartan (ATACAND) 4 MG tablet Take 1 tablet by mouth Daily. for " blood pressure 90 tablet 1   • clonazePAM (KlonoPIN) 1 MG tablet Take 1 tablet by mouth Daily. 90 tablet 0   • levothyroxine (SYNTHROID, LEVOTHROID) 25 MCG tablet TAKE ONE AND ONE-HALF TABLETS DAILY 135 tablet 2   • mirtazapine (REMERON) 30 MG tablet TAKE 1 TABLET EVERY NIGHT 90 tablet 1   • Multiple Vitamins-Minerals (CENTRUM ADULTS PO) Take  by mouth daily.     • mupirocin (BACTROBAN) 2 % ointment Apply  topically 2 (Two) Times a Day. 15 g 0   • Omega-3 Fatty Acids (OMEGA 3 PO) Take  by mouth.     • Polyethylene Glycol 3350 granules Take  by mouth 2 (two) times a day.     • tolterodine LA (DETROL LA) 4 MG 24 hr capsule TAKE 1 CAPSULE DAILY 90 capsule 3   • [DISCONTINUED] HAVRIX 1440 EL U/ML vaccine inject 1 milliliter intramuscularly  0     No facility-administered encounter medications on file as of 3/25/2019.        Reviewed use of high risk medication in the elderly: yes  Reviewed for potential of harmful drug interactions in the elderly: not applicable     Labs as noted  [facility]  Recommend Hep A and shingles vaccine at pharmacy  Patient to return to clinic in approximately 1 month for a right knee steroid injection  Patient signed controlled substance contract today due to underlying use of Klonopin for anxiety and sleep  Follow-up with Dr. Monique for chronic urinary incontinence    Follow Up:  Return in about 4 weeks (around 4/22/2019) for right knee injection.     An After Visit Summary and PPPS with all of these plans were given to the patient.

## 2019-03-28 LAB
ALBUMIN SERPL-MCNC: 3.5 G/DL (ref 3.5–5)
ALBUMIN/GLOB SERPL: 1.3 G/DL (ref 1–2)
ALP SERPL-CCNC: 77 U/L (ref 38–126)
ALT SERPL-CCNC: 29 U/L (ref 13–69)
AST SERPL-CCNC: 29 U/L (ref 15–46)
BASOPHILS # BLD AUTO: 0.06 10*3/MM3 (ref 0–0.2)
BASOPHILS NFR BLD AUTO: 1 % (ref 0–1.5)
BILIRUB SERPL-MCNC: 1 MG/DL (ref 0.2–1.3)
BUN SERPL-MCNC: 13 MG/DL (ref 7–20)
BUN/CREAT SERPL: 16.3 (ref 6.3–21.9)
CALCIUM SERPL-MCNC: 9.2 MG/DL (ref 8.4–10.2)
CHLORIDE SERPL-SCNC: 102 MMOL/L (ref 98–107)
CHOLEST SERPL-MCNC: 127 MG/DL (ref 0–199)
CHOLEST/HDLC SERPL: 2.95 {RATIO}
CO2 SERPL-SCNC: 32 MMOL/L (ref 26–30)
CREAT SERPL-MCNC: 0.8 MG/DL (ref 0.6–1.3)
EOSINOPHIL # BLD AUTO: 0.21 10*3/MM3 (ref 0–0.4)
EOSINOPHIL NFR BLD AUTO: 3.3 % (ref 0.3–6.2)
ERYTHROCYTE [DISTWIDTH] IN BLOOD BY AUTOMATED COUNT: 13.4 % (ref 12.3–15.4)
GLOBULIN SER CALC-MCNC: 2.6 GM/DL
GLUCOSE SERPL-MCNC: 90 MG/DL (ref 74–98)
HCT VFR BLD AUTO: 41.9 % (ref 37.5–51)
HDLC SERPL-MCNC: 43 MG/DL (ref 40–60)
HGB BLD-MCNC: 13.6 G/DL (ref 13–17.7)
IMM GRANULOCYTES # BLD AUTO: 0.02 10*3/MM3 (ref 0–0.05)
IMM GRANULOCYTES NFR BLD AUTO: 0.3 % (ref 0–0.5)
LDLC SERPL CALC-MCNC: 72 MG/DL (ref 0–99)
LYMPHOCYTES # BLD AUTO: 1.45 10*3/MM3 (ref 0.7–3.1)
LYMPHOCYTES NFR BLD AUTO: 23 % (ref 19.6–45.3)
MCH RBC QN AUTO: 31.1 PG (ref 26.6–33)
MCHC RBC AUTO-ENTMCNC: 32.5 G/DL (ref 31.5–35.7)
MCV RBC AUTO: 95.9 FL (ref 79–97)
MONOCYTES # BLD AUTO: 0.42 10*3/MM3 (ref 0.1–0.9)
MONOCYTES NFR BLD AUTO: 6.7 % (ref 5–12)
NEUTROPHILS # BLD AUTO: 4.14 10*3/MM3 (ref 1.4–7)
NEUTROPHILS NFR BLD AUTO: 65.7 % (ref 42.7–76)
NRBC BLD AUTO-RTO: 0 /100 WBC (ref 0–0)
PLATELET # BLD AUTO: 310 10*3/MM3 (ref 140–450)
POTASSIUM SERPL-SCNC: 5.1 MMOL/L (ref 3.5–5.1)
PROT SERPL-MCNC: 6.1 G/DL (ref 6.3–8.2)
RBC # BLD AUTO: 4.37 10*6/MM3 (ref 4.14–5.8)
SODIUM SERPL-SCNC: 139 MMOL/L (ref 137–145)
T4 FREE SERPL-MCNC: 1.21 NG/DL (ref 0.78–2.19)
TRIGL SERPL-MCNC: 62 MG/DL
TSH SERPL DL<=0.005 MIU/L-ACNC: 2.48 MIU/ML (ref 0.47–4.68)
VLDLC SERPL CALC-MCNC: 12.4 MG/DL
WBC # BLD AUTO: 6.3 10*3/MM3 (ref 3.4–10.8)

## 2019-04-30 ENCOUNTER — OFFICE VISIT (OUTPATIENT)
Dept: INTERNAL MEDICINE | Facility: CLINIC | Age: 84
End: 2019-04-30

## 2019-04-30 VITALS
DIASTOLIC BLOOD PRESSURE: 64 MMHG | TEMPERATURE: 97.6 F | SYSTOLIC BLOOD PRESSURE: 128 MMHG | RESPIRATION RATE: 18 BRPM | HEIGHT: 65 IN | OXYGEN SATURATION: 98 % | WEIGHT: 123 LBS | HEART RATE: 64 BPM | BODY MASS INDEX: 20.49 KG/M2

## 2019-04-30 DIAGNOSIS — M17.11 PRIMARY OSTEOARTHRITIS OF RIGHT KNEE: Primary | ICD-10-CM

## 2019-04-30 PROCEDURE — 20610 DRAIN/INJ JOINT/BURSA W/O US: CPT | Performed by: INTERNAL MEDICINE

## 2019-04-30 RX ORDER — CLONAZEPAM 1 MG/1
1 TABLET ORAL DAILY
Qty: 90 TABLET | Refills: 0 | Status: SHIPPED | OUTPATIENT
Start: 2019-04-30 | End: 2019-08-01 | Stop reason: SDUPTHER

## 2019-04-30 RX ORDER — METHYLPREDNISOLONE ACETATE 80 MG/ML
80 INJECTION, SUSPENSION INTRA-ARTICULAR; INTRALESIONAL; INTRAMUSCULAR; SOFT TISSUE ONCE
Status: COMPLETED | OUTPATIENT
Start: 2019-04-30 | End: 2019-04-30

## 2019-04-30 RX ORDER — METHYLPREDNISOLONE ACETATE 80 MG/ML
40 INJECTION, SUSPENSION INTRA-ARTICULAR; INTRALESIONAL; INTRAMUSCULAR; SOFT TISSUE
Status: DISCONTINUED | OUTPATIENT
Start: 2019-04-30 | End: 2019-04-30 | Stop reason: HOSPADM

## 2019-04-30 RX ADMIN — METHYLPREDNISOLONE ACETATE 80 MG: 80 INJECTION, SUSPENSION INTRA-ARTICULAR; INTRALESIONAL; INTRAMUSCULAR; SOFT TISSUE at 11:17

## 2019-04-30 RX ADMIN — METHYLPREDNISOLONE ACETATE 40 MG: 80 INJECTION, SUSPENSION INTRA-ARTICULAR; INTRALESIONAL; INTRAMUSCULAR; SOFT TISSUE at 12:44

## 2019-04-30 NOTE — PROGRESS NOTES
Procedure   Arthrocentesis  Date/Time: 4/30/2019 12:44 PM  Performed by: Vermeesch, Marilyn K, MD  Authorized by: Vermeesch, Marilyn K, MD   Consent: Written consent obtained.  Risks and benefits: risks, benefits and alternatives were discussed  Consent given by: patient  Patient understanding: patient states understanding of the procedure being performed  Patient consent: the patient's understanding of the procedure matches consent given  Procedure consent: procedure consent matches procedure scheduled  Site marked: the operative site was marked  Patient identity confirmed: verbally with patient  Indications: pain   Body area: knee  Joint: right knee  Local anesthesia used: yes  Anesthesia: local infiltration    Anesthesia:  Local anesthesia used: yes  Local Anesthetic: lidocaine 1% without epinephrine  Anesthetic total: 3 mL    Sedation:  Patient sedated: no    Preparation: Patient was prepped and draped in the usual sterile fashion.  Needle size: 22 G  Ultrasound guidance: no  Approach: medial  Patient tolerance: Patient tolerated the procedure well with no immediate complications

## 2019-04-30 NOTE — TELEPHONE ENCOUNTER
Patient informed us that he is down to one pill and needs a refill as soon as possible.    Please advise.

## 2019-07-15 RX ORDER — ATORVASTATIN CALCIUM 20 MG/1
TABLET, FILM COATED ORAL
Qty: 90 TABLET | Refills: 1 | Status: SHIPPED | OUTPATIENT
Start: 2019-07-15 | End: 2020-01-13

## 2019-07-28 ENCOUNTER — APPOINTMENT (OUTPATIENT)
Dept: CT IMAGING | Facility: HOSPITAL | Age: 84
End: 2019-07-28

## 2019-07-28 ENCOUNTER — HOSPITAL ENCOUNTER (EMERGENCY)
Facility: HOSPITAL | Age: 84
Discharge: HOME | End: 2019-07-28
Attending: STUDENT IN AN ORGANIZED HEALTH CARE EDUCATION/TRAINING PROGRAM | Admitting: STUDENT IN AN ORGANIZED HEALTH CARE EDUCATION/TRAINING PROGRAM

## 2019-07-28 VITALS
DIASTOLIC BLOOD PRESSURE: 69 MMHG | SYSTOLIC BLOOD PRESSURE: 150 MMHG | WEIGHT: 117 LBS | OXYGEN SATURATION: 97 % | TEMPERATURE: 97.6 F | BODY MASS INDEX: 17.33 KG/M2 | RESPIRATION RATE: 16 BRPM | HEIGHT: 69 IN | HEART RATE: 59 BPM

## 2019-07-28 DIAGNOSIS — K59.00 CONSTIPATION, UNSPECIFIED CONSTIPATION TYPE: Primary | ICD-10-CM

## 2019-07-28 LAB
ALBUMIN SERPL-MCNC: 4.1 G/DL (ref 3.5–5)
ALBUMIN/GLOB SERPL: 1.4 G/DL (ref 1–2)
ALP SERPL-CCNC: 71 U/L (ref 38–126)
ALT SERPL W P-5'-P-CCNC: 26 U/L (ref 13–69)
ANION GAP SERPL CALCULATED.3IONS-SCNC: 15.1 MMOL/L (ref 10–20)
AST SERPL-CCNC: 47 U/L (ref 15–46)
BASOPHILS # BLD AUTO: 0.07 10*3/MM3 (ref 0–0.2)
BASOPHILS NFR BLD AUTO: 0.8 % (ref 0–1.5)
BILIRUB SERPL-MCNC: 0.8 MG/DL (ref 0.2–1.3)
BILIRUB UR QL STRIP: NEGATIVE
BUN BLD-MCNC: 10 MG/DL (ref 7–20)
BUN/CREAT SERPL: 12.5 (ref 6.3–21.9)
CALCIUM SPEC-SCNC: 8.9 MG/DL (ref 8.4–10.2)
CHLORIDE SERPL-SCNC: 100 MMOL/L (ref 98–107)
CLARITY UR: CLEAR
CO2 SERPL-SCNC: 25 MMOL/L (ref 26–30)
COLOR UR: YELLOW
CREAT BLD-MCNC: 0.8 MG/DL (ref 0.6–1.3)
DEPRECATED RDW RBC AUTO: 47.2 FL (ref 37–54)
EOSINOPHIL # BLD AUTO: 0.16 10*3/MM3 (ref 0–0.4)
EOSINOPHIL NFR BLD AUTO: 1.7 % (ref 0.3–6.2)
ERYTHROCYTE [DISTWIDTH] IN BLOOD BY AUTOMATED COUNT: 13.5 % (ref 12.3–15.4)
GFR SERPL CREATININE-BSD FRML MDRD: 90 ML/MIN/1.73
GLOBULIN UR ELPH-MCNC: 3 GM/DL
GLUCOSE BLD-MCNC: 99 MG/DL (ref 74–98)
GLUCOSE UR STRIP-MCNC: NEGATIVE MG/DL
HCT VFR BLD AUTO: 44.6 % (ref 37.5–51)
HGB BLD-MCNC: 14.2 G/DL (ref 13–17.7)
HGB UR QL STRIP.AUTO: NEGATIVE
HOLD SPECIMEN: NORMAL
IMM GRANULOCYTES # BLD AUTO: 0.04 10*3/MM3 (ref 0–0.05)
IMM GRANULOCYTES NFR BLD AUTO: 0.4 % (ref 0–0.5)
KETONES UR QL STRIP: NEGATIVE
LEUKOCYTE ESTERASE UR QL STRIP.AUTO: NEGATIVE
LIPASE SERPL-CCNC: 131 U/L (ref 23–300)
LYMPHOCYTES # BLD AUTO: 1.64 10*3/MM3 (ref 0.7–3.1)
LYMPHOCYTES NFR BLD AUTO: 17.6 % (ref 19.6–45.3)
MCH RBC QN AUTO: 30.3 PG (ref 26.6–33)
MCHC RBC AUTO-ENTMCNC: 31.8 G/DL (ref 31.5–35.7)
MCV RBC AUTO: 95.1 FL (ref 79–97)
MONOCYTES # BLD AUTO: 0.6 10*3/MM3 (ref 0.1–0.9)
MONOCYTES NFR BLD AUTO: 6.4 % (ref 5–12)
NEUTROPHILS # BLD AUTO: 6.82 10*3/MM3 (ref 1.7–7)
NEUTROPHILS NFR BLD AUTO: 73.1 % (ref 42.7–76)
NITRITE UR QL STRIP: NEGATIVE
NRBC BLD AUTO-RTO: 0 /100 WBC (ref 0–0.2)
PH UR STRIP.AUTO: 7.5 [PH] (ref 5–8)
PLATELET # BLD AUTO: 320 10*3/MM3 (ref 140–450)
PMV BLD AUTO: 10.5 FL (ref 6–12)
POTASSIUM BLD-SCNC: 4.1 MMOL/L (ref 3.5–5.1)
PROT SERPL-MCNC: 7.1 G/DL (ref 6.3–8.2)
PROT UR QL STRIP: NEGATIVE
RBC # BLD AUTO: 4.69 10*6/MM3 (ref 4.14–5.8)
SODIUM BLD-SCNC: 136 MMOL/L (ref 137–145)
SP GR UR STRIP: 1.01 (ref 1–1.03)
UROBILINOGEN UR QL STRIP: NORMAL
WBC NRBC COR # BLD: 9.33 10*3/MM3 (ref 3.4–10.8)
WHOLE BLOOD HOLD SPECIMEN: NORMAL
WHOLE BLOOD HOLD SPECIMEN: NORMAL

## 2019-07-28 PROCEDURE — 25010000002 IOPAMIDOL 61 % SOLUTION: Performed by: STUDENT IN AN ORGANIZED HEALTH CARE EDUCATION/TRAINING PROGRAM

## 2019-07-28 PROCEDURE — 74177 CT ABD & PELVIS W/CONTRAST: CPT

## 2019-07-28 PROCEDURE — 80053 COMPREHEN METABOLIC PANEL: CPT | Performed by: PHYSICIAN ASSISTANT

## 2019-07-28 PROCEDURE — 85025 COMPLETE CBC W/AUTO DIFF WBC: CPT | Performed by: PHYSICIAN ASSISTANT

## 2019-07-28 PROCEDURE — 93005 ELECTROCARDIOGRAM TRACING: CPT

## 2019-07-28 PROCEDURE — 99284 EMERGENCY DEPT VISIT MOD MDM: CPT

## 2019-07-28 PROCEDURE — 81003 URINALYSIS AUTO W/O SCOPE: CPT | Performed by: PHYSICIAN ASSISTANT

## 2019-07-28 PROCEDURE — 96360 HYDRATION IV INFUSION INIT: CPT

## 2019-07-28 PROCEDURE — 83690 ASSAY OF LIPASE: CPT | Performed by: PHYSICIAN ASSISTANT

## 2019-07-28 RX ORDER — SODIUM CHLORIDE 0.9 % (FLUSH) 0.9 %
10 SYRINGE (ML) INJECTION AS NEEDED
Status: DISCONTINUED | OUTPATIENT
Start: 2019-07-28 | End: 2019-07-28 | Stop reason: HOSPADM

## 2019-07-28 RX ADMIN — SODIUM CHLORIDE 500 ML: 9 INJECTION, SOLUTION INTRAVENOUS at 10:41

## 2019-07-28 RX ADMIN — IOPAMIDOL 100 ML: 612 INJECTION, SOLUTION INTRAVENOUS at 11:44

## 2019-08-01 ENCOUNTER — OFFICE VISIT (OUTPATIENT)
Dept: INTERNAL MEDICINE | Facility: CLINIC | Age: 84
End: 2019-08-01

## 2019-08-01 VITALS
TEMPERATURE: 97.4 F | SYSTOLIC BLOOD PRESSURE: 126 MMHG | HEART RATE: 65 BPM | DIASTOLIC BLOOD PRESSURE: 64 MMHG | BODY MASS INDEX: 17.33 KG/M2 | WEIGHT: 117 LBS | OXYGEN SATURATION: 96 % | HEIGHT: 69 IN

## 2019-08-01 DIAGNOSIS — K59.00 CONSTIPATION, UNSPECIFIED CONSTIPATION TYPE: Primary | ICD-10-CM

## 2019-08-01 PROCEDURE — 99213 OFFICE O/P EST LOW 20 MIN: CPT | Performed by: INTERNAL MEDICINE

## 2019-08-01 RX ORDER — CLONAZEPAM 1 MG/1
1 TABLET ORAL DAILY
Qty: 90 TABLET | Refills: 0 | Status: SHIPPED | OUTPATIENT
Start: 2019-08-01 | End: 2019-10-24 | Stop reason: SDUPTHER

## 2019-08-01 NOTE — PROGRESS NOTES
"Chief Complaint   Patient presents with   • Follow-up     Pt went to [facility] ER due to constipation and not having a bowel movement in 3 days. Pt states he's still having issues with constipation.     Subjective   Checo Michele is a 89 y.o. male.     Here today for ER follow up.  He went to ER after not having a BM for 3 days.   He says he has not been eating much, even before he went to ER.  He does not have an appetite.    He complained of bloating and fullness prior to ER visit.   CT was negative for constipation, he had some AF levels suggestive of ileus.   He was given an enema and had BM in ER and felt better, was discharged. He had 4 more BMs on 7/30 the following day.    Denies any current stomach pain.  No NV.  His weight is down 6 lbs in past 4 mo.    His says wife cooks for herself.  His daughter lives on first floor of house and cooks for herself.      I called daughter back from waiting room.  She states that patient is completely fixated on bowel movements.  If he does not have a bowel movement by the end of the day he takes multiple laxatives or magnesium citrate.  It does not matter if he eats or not that day he will take something to try to make himself have a bowel movement.  If he does not have a bowel movement he will not eat the following day or for several days.  She will try to work with him to start to eat meals regularly to prevent any further weight loss.             The following portions of the patient's history were reviewed and updated as appropriate: allergies, current medications, past family history, past medical history, past social history, past surgical history and problem list.    Review of Systems   Constitutional: Positive for appetite change and unexpected weight change.   Gastrointestinal:        See history of present illness       Objective   /64   Pulse 65   Temp 97.4 °F (36.3 °C)   Ht 175.3 cm (69\")   Wt 53.1 kg (117 lb)   SpO2 96%   BMI 17.28 kg/m²   " Body mass index is 17.28 kg/m².  Physical Exam   Constitutional: He is oriented to person, place, and time. No distress.   Elderly gentleman who appears very thin and cachectic, no obvious distress today   HENT:   Head: Normocephalic and atraumatic.   Cardiovascular: Normal rate, regular rhythm and normal heart sounds.   No murmur heard.  Pulmonary/Chest: Effort normal and breath sounds normal. No respiratory distress.   Abdominal: Soft. Bowel sounds are normal. He exhibits no distension. There is no tenderness.   Thin, scaphoid abdomen with no palpable stool or masses, no guarding or rebound, no hepatosplenomegaly   Neurological: He is alert and oriented to person, place, and time. No cranial nerve deficit. Coordination normal.   Psychiatric: He has a normal mood and affect. His behavior is normal. Judgment and thought content normal.   Nursing note and vitals reviewed.      Assessment/Plan   Checo Michele is here today and the following problems have been addressed:      Checo was seen today for follow-up.    Diagnoses and all orders for this visit:    Constipation, unspecified constipation type    Other orders  -     clonazePAM (KlonoPIN) 1 MG tablet; Take 1 tablet by mouth Daily.    Encouraged patient to eat 3 meals a day with fruits and vegetables  Increase water intake  Take MiraLAX 1 capful or 17 g once a day  May also eat 3 prunes daily  Explained to patient and daughter if he does not eat regularly he is not going to have regular bowel movements, explained to patient it is okay to not have a bowel movement every day  I have had this discussion with patient several times in the past  Refill of Klonopin provided to patient today, Kuldip was appropriate, he does not call early and there is no signs of abuse    Return to clinic in 6 weeks for follow-up of all medical problems and constipation concerns    Please note that portions of this note were completed with a voice recognition program.  Efforts  were made to edit dictation, but occasionally words are mistranscribed.

## 2019-08-08 ENCOUNTER — OFFICE VISIT (OUTPATIENT)
Dept: INTERNAL MEDICINE | Facility: CLINIC | Age: 84
End: 2019-08-08

## 2019-08-08 VITALS
OXYGEN SATURATION: 98 % | HEART RATE: 58 BPM | BODY MASS INDEX: 17.63 KG/M2 | TEMPERATURE: 97.3 F | DIASTOLIC BLOOD PRESSURE: 70 MMHG | WEIGHT: 119 LBS | SYSTOLIC BLOOD PRESSURE: 124 MMHG | HEIGHT: 69 IN

## 2019-08-08 DIAGNOSIS — K59.00 CONSTIPATION, UNSPECIFIED CONSTIPATION TYPE: Primary | ICD-10-CM

## 2019-08-08 DIAGNOSIS — Z78.9 FREQUENT USE OF LAXATIVES: ICD-10-CM

## 2019-08-08 PROCEDURE — 99213 OFFICE O/P EST LOW 20 MIN: CPT | Performed by: INTERNAL MEDICINE

## 2019-08-08 NOTE — PROGRESS NOTES
"Chief Complaint   Patient presents with   • Abstract     Pt would like to discuss his BM issues.     Subjective   Checo Michele is a 89 y.o. male.     Checo is here today to discuss his BMs.   He has had a fixation with his BMs for as long as I have been caring for him  He feels as though he needs to have a BM daily, even if he has not eaten a thing all day long.   He has been taking mag citrate every 3-4 days as needed.  His wife and daughter are trying to stop him from taking this regularly.   He did a water enema a few days ago because he was not allowed to use his mag citrate per his family and says he had a good result.    He started on miralax 1 cap daily about a week ago.  He has not had a BM today.  He is not uncomfortable on days he does not have a BM.    He is worried he is going to get stopped up if he cannot have his laxatives.          The following portions of the patient's history were reviewed and updated as appropriate: allergies, current medications, past family history, past medical history, past social history, past surgical history and problem list.    Review of Systems   Constitutional: Negative for appetite change and unexpected weight change.   Gastrointestinal: Negative for abdominal distention, abdominal pain, blood in stool, constipation, diarrhea, nausea and vomiting.       Objective   /70   Pulse 58   Temp 97.3 °F (36.3 °C)   Ht 175.3 cm (69\")   Wt 54 kg (119 lb)   SpO2 98%   BMI 17.57 kg/m²   Body mass index is 17.57 kg/m².  Physical Exam   Constitutional: He is oriented to person, place, and time. He appears well-developed and well-nourished. No distress.   Pleasant elderly man in NAD today   HENT:   Head: Normocephalic and atraumatic.   Pulmonary/Chest: Effort normal.   Abdominal: Soft. Bowel sounds are normal. He exhibits no distension and no mass. There is no tenderness. There is no rebound and no guarding.   Neurological: He is alert and oriented to person, " place, and time. No cranial nerve deficit. Coordination normal.   Psychiatric: His behavior is normal. Thought content normal.   Pleasant mood, somewhat apprehensive and anxious today   Nursing note and vitals reviewed.      Assessment/Plan   Checo Michele is here today and the following problems have been addressed:      Checo was seen today for abstract.    Diagnoses and all orders for this visit:    Constipation, unspecified constipation type    Frequent use of laxatives    recommend he continue miralax and increase to 1.5 cap in 8 oz fluid daily  Increase water daily, he admits he does not drink many fluids during the day  Eat prunes 3-5 a day  Increase fruits and veg in diet    RTC as scheduled in September    Please note that portions of this note were completed with a voice recognition program.  Efforts were made to edit dictation, but occasionally words are mistranscribed.

## 2019-09-12 ENCOUNTER — OFFICE VISIT (OUTPATIENT)
Dept: INTERNAL MEDICINE | Facility: CLINIC | Age: 84
End: 2019-09-12

## 2019-09-12 VITALS
WEIGHT: 120 LBS | HEIGHT: 69 IN | BODY MASS INDEX: 17.77 KG/M2 | TEMPERATURE: 97.6 F | OXYGEN SATURATION: 92 % | DIASTOLIC BLOOD PRESSURE: 64 MMHG | HEART RATE: 63 BPM | SYSTOLIC BLOOD PRESSURE: 120 MMHG

## 2019-09-12 DIAGNOSIS — N39.3 MALE URINARY STRESS INCONTINENCE: ICD-10-CM

## 2019-09-12 DIAGNOSIS — M17.11 PRIMARY OSTEOARTHRITIS OF RIGHT KNEE: ICD-10-CM

## 2019-09-12 DIAGNOSIS — E78.2 MIXED HYPERLIPIDEMIA: ICD-10-CM

## 2019-09-12 DIAGNOSIS — I10 ESSENTIAL HYPERTENSION: Primary | ICD-10-CM

## 2019-09-12 DIAGNOSIS — Z78.9 FREQUENT USE OF LAXATIVES: ICD-10-CM

## 2019-09-12 DIAGNOSIS — E03.8 OTHER SPECIFIED HYPOTHYROIDISM: ICD-10-CM

## 2019-09-12 DIAGNOSIS — K21.9 GASTROESOPHAGEAL REFLUX DISEASE WITHOUT ESOPHAGITIS: ICD-10-CM

## 2019-09-12 PROBLEM — M25.561 CHRONIC PAIN OF RIGHT KNEE: Status: RESOLVED | Noted: 2017-07-07 | Resolved: 2019-09-12

## 2019-09-12 PROBLEM — G89.29 CHRONIC PAIN OF RIGHT KNEE: Status: RESOLVED | Noted: 2017-07-07 | Resolved: 2019-09-12

## 2019-09-12 PROCEDURE — 90653 IIV ADJUVANT VACCINE IM: CPT | Performed by: INTERNAL MEDICINE

## 2019-09-12 PROCEDURE — G0008 ADMIN INFLUENZA VIRUS VAC: HCPCS | Performed by: INTERNAL MEDICINE

## 2019-09-12 PROCEDURE — 99214 OFFICE O/P EST MOD 30 MIN: CPT | Performed by: INTERNAL MEDICINE

## 2019-09-12 RX ORDER — MIRTAZAPINE 30 MG/1
30 TABLET, FILM COATED ORAL NIGHTLY
Qty: 90 TABLET | Refills: 1 | Status: SHIPPED | OUTPATIENT
Start: 2019-09-12 | End: 2019-11-24 | Stop reason: HOSPADM

## 2019-09-12 RX ORDER — TOLTERODINE 4 MG/1
4 CAPSULE, EXTENDED RELEASE ORAL DAILY
Qty: 90 CAPSULE | Refills: 3 | Status: SHIPPED | OUTPATIENT
Start: 2019-09-12 | End: 2020-01-01

## 2019-09-12 RX ORDER — LEVOTHYROXINE SODIUM 0.03 MG/1
37.5 TABLET ORAL DAILY
Qty: 135 TABLET | Refills: 2 | Status: SHIPPED | OUTPATIENT
Start: 2019-09-12 | End: 2019-11-24 | Stop reason: HOSPADM

## 2019-09-12 RX ORDER — CANDESARTAN 4 MG/1
4 TABLET ORAL DAILY
Qty: 90 TABLET | Refills: 1 | Status: SHIPPED | OUTPATIENT
Start: 2019-09-12 | End: 2020-03-10

## 2019-09-12 RX ORDER — ATENOLOL 50 MG/1
TABLET ORAL
Qty: 180 TABLET | Refills: 3 | Status: SHIPPED | OUTPATIENT
Start: 2019-09-12 | End: 2019-11-24 | Stop reason: HOSPADM

## 2019-09-12 NOTE — PROGRESS NOTES
Chief Complaint   Patient presents with   • Follow-up     for GERD, HLD, HTN, and Hypothyroidism.      Subjective   Checo Michele is a 89 y.o. male.     Here today for follow up of HTN, HLD, GERD, constipation, hypothyroid, DJD, urine incontinence and anxiety.   HTN/HLD- BP is well controlled today.  He denies any CP, SOA, edema or palpitations.  He is compliant with lipitor  GERD- he denies any GERD sxs at this time  Constipation- pt was here last month and encouraged to drink more water, eat prunes/fruits/vegetables and increase miralax to 1.5 caps daily.  He has increased to 2 caps of miralax a day.  He has a hard time drinking water, drinks only a few glasses a day.  He is having daily bowel movements and denies any abdominal pain at this time  DJD- he has intermittent pain in right knee.  Steroid shot did not help much.  He says tylenol is helpful for his pain and he takes Tylenol at night on an as-needed basis  Urine incontinence- he wears depends daily for this.  He sees Dr Monique for this annually and remains on detrol for this  Anxiety- he remains on klonapin and remeron, this is quite helpful for this  HCM- he will receive flu shot today         The following portions of the patient's history were reviewed and updated as appropriate: allergies, current medications, past family history, past medical history, past social history, past surgical history and problem list.    Review of Systems   Constitutional: Negative for activity change, appetite change and unexpected weight change.   HENT: Positive for hearing loss.    Eyes: Negative for visual disturbance.   Respiratory: Negative for shortness of breath.    Cardiovascular: Negative for chest pain, palpitations and leg swelling.   Gastrointestinal: Negative for abdominal pain.   Genitourinary:        Chronic urine incontinence   Musculoskeletal: Positive for arthralgias.   Neurological: Negative for headaches.   Psychiatric/Behavioral: Negative for  "dysphoric mood and sleep disturbance. The patient is not nervous/anxious.        Objective   /64   Pulse 63   Temp 97.6 °F (36.4 °C)   Ht 175.3 cm (69\")   Wt 54.4 kg (120 lb)   SpO2 92%   BMI 17.72 kg/m²   Body mass index is 17.72 kg/m².  Physical Exam   Constitutional: He is oriented to person, place, and time. He appears well-developed and well-nourished. No distress.   Very pleasant elderly gentleman who appears his stated age, no distress today   HENT:   Head: Normocephalic and atraumatic.   Mouth/Throat: Oropharynx is clear and moist.   Hearing aids in place  Poor dentition noted   Eyes: Conjunctivae and EOM are normal. Pupils are equal, round, and reactive to light.   Neck: Normal range of motion. Neck supple. No thyromegaly present.   Cardiovascular: Normal rate, regular rhythm, normal heart sounds and intact distal pulses.   No murmur heard.  No carotid bruits   Pulmonary/Chest: Effort normal and breath sounds normal. No respiratory distress. He has no wheezes.   Abdominal: Soft. Bowel sounds are normal. He exhibits no distension. There is no tenderness.   Musculoskeletal: Normal range of motion. He exhibits no edema.   Right knee with bony thickening noted over medial aspect of knee, crepitus   Lymphadenopathy:     He has no cervical adenopathy.   Neurological: He is alert and oriented to person, place, and time. No cranial nerve deficit. Coordination normal.   Skin:   Forearms with several healing skin tears noted   Psychiatric: He has a normal mood and affect. His behavior is normal. Thought content normal.   Nursing note and vitals reviewed.      Assessment/Plan   Checo Michele is here today and the following problems have been addressed:      Checo was seen today for follow-up.    Diagnoses and all orders for this visit:    Essential hypertension    Mixed hyperlipidemia    Gastroesophageal reflux disease without esophagitis    Other specified hypothyroidism    Primary " osteoarthritis of right knee    Male urinary stress incontinence    Frequent use of laxatives    Other orders  -     levothyroxine (SYNTHROID, LEVOTHROID) 25 MCG tablet; Take 1.5 tablets by mouth Daily.  -     mirtazapine (REMERON) 30 MG tablet; Take 1 tablet by mouth Every Night.  -     candesartan (ATACAND) 4 MG tablet; Take 1 tablet by mouth Daily. for blood pressure  -     tolterodine LA (DETROL LA) 4 MG 24 hr capsule; Take 1 capsule by mouth Daily.        Follow heart healthy/low salt diet  Avoid processed foods  Monitor blood pressure on occasion  Exercise as tolerated   Take all medications as prescribed  Flu shot given today  Recommend tylenol as needed for DJD pain  Continue miralax 2 caps daily for chronic constipation  Increase water in diet  See Dr Monique for chronic incontinence    Return in about 4 months (around 1/12/2020).      Marilyn K. Vermeesch, MD      Please note that portions of this note were completed with a voice recognition program.  Efforts were made to edit dictation, but occasionally words are mistranscribed.

## 2019-10-24 RX ORDER — CLONAZEPAM 1 MG/1
1 TABLET ORAL DAILY
Qty: 90 TABLET | Refills: 0 | Status: ON HOLD | OUTPATIENT
Start: 2019-10-24 | End: 2019-11-24 | Stop reason: SDUPTHER

## 2019-10-24 RX ORDER — CLONAZEPAM 1 MG/1
1 TABLET ORAL DAILY
Qty: 90 TABLET | Refills: 0 | Status: SHIPPED | OUTPATIENT
Start: 2019-10-24 | End: 2019-10-24 | Stop reason: SDUPTHER

## 2019-11-22 ENCOUNTER — HOSPITAL ENCOUNTER (OUTPATIENT)
Facility: HOSPITAL | Age: 84
Setting detail: OBSERVATION
Discharge: HOME HEALTH | End: 2019-11-24
Attending: EMERGENCY MEDICINE | Admitting: INTERNAL MEDICINE

## 2019-11-22 ENCOUNTER — APPOINTMENT (OUTPATIENT)
Dept: MRI IMAGING | Facility: HOSPITAL | Age: 84
End: 2019-11-22

## 2019-11-22 ENCOUNTER — APPOINTMENT (OUTPATIENT)
Dept: CT IMAGING | Facility: HOSPITAL | Age: 84
End: 2019-11-22

## 2019-11-22 ENCOUNTER — APPOINTMENT (OUTPATIENT)
Dept: GENERAL RADIOLOGY | Facility: HOSPITAL | Age: 84
End: 2019-11-22

## 2019-11-22 DIAGNOSIS — I67.4 HYPERTENSIVE ENCEPHALOPATHY: Primary | ICD-10-CM

## 2019-11-22 DIAGNOSIS — M17.11 PRIMARY OSTEOARTHRITIS OF RIGHT KNEE: ICD-10-CM

## 2019-11-22 LAB
ALBUMIN SERPL-MCNC: 3.8 G/DL (ref 3.5–5.2)
ALBUMIN/GLOB SERPL: 1.3 G/DL
ALP SERPL-CCNC: 66 U/L (ref 39–117)
ALT SERPL W P-5'-P-CCNC: 10 U/L (ref 1–41)
ANION GAP SERPL CALCULATED.3IONS-SCNC: 12.7 MMOL/L (ref 5–15)
AST SERPL-CCNC: 27 U/L (ref 1–40)
BASOPHILS # BLD AUTO: 0.03 10*3/MM3 (ref 0–0.2)
BASOPHILS NFR BLD AUTO: 0.4 % (ref 0–1.5)
BILIRUB SERPL-MCNC: 0.6 MG/DL (ref 0.2–1.2)
BILIRUB UR QL STRIP: NEGATIVE
BUN BLD-MCNC: 12 MG/DL (ref 8–23)
BUN/CREAT SERPL: 16 (ref 7–25)
CALCIUM SPEC-SCNC: 9 MG/DL (ref 8.2–9.6)
CHLORIDE SERPL-SCNC: 96 MMOL/L (ref 98–107)
CLARITY UR: CLEAR
CO2 SERPL-SCNC: 27.3 MMOL/L (ref 22–29)
COLOR UR: YELLOW
CREAT BLD-MCNC: 0.75 MG/DL (ref 0.76–1.27)
DEPRECATED RDW RBC AUTO: 44.3 FL (ref 37–54)
EOSINOPHIL # BLD AUTO: 0.01 10*3/MM3 (ref 0–0.4)
EOSINOPHIL NFR BLD AUTO: 0.1 % (ref 0.3–6.2)
ERYTHROCYTE [DISTWIDTH] IN BLOOD BY AUTOMATED COUNT: 13.1 % (ref 12.3–15.4)
GFR SERPL CREATININE-BSD FRML MDRD: 97 ML/MIN/1.73
GLOBULIN UR ELPH-MCNC: 3 GM/DL
GLUCOSE BLD-MCNC: 94 MG/DL (ref 65–99)
GLUCOSE UR STRIP-MCNC: NEGATIVE MG/DL
HCT VFR BLD AUTO: 38.1 % (ref 37.5–51)
HGB BLD-MCNC: 12.8 G/DL (ref 13–17.7)
HGB UR QL STRIP.AUTO: NEGATIVE
HOLD SPECIMEN: NORMAL
HOLD SPECIMEN: NORMAL
IMM GRANULOCYTES # BLD AUTO: 0.02 10*3/MM3 (ref 0–0.05)
IMM GRANULOCYTES NFR BLD AUTO: 0.3 % (ref 0–0.5)
KETONES UR QL STRIP: ABNORMAL
LEUKOCYTE ESTERASE UR QL STRIP.AUTO: NEGATIVE
LYMPHOCYTES # BLD AUTO: 0.99 10*3/MM3 (ref 0.7–3.1)
LYMPHOCYTES NFR BLD AUTO: 13.1 % (ref 19.6–45.3)
MCH RBC QN AUTO: 31 PG (ref 26.6–33)
MCHC RBC AUTO-ENTMCNC: 33.6 G/DL (ref 31.5–35.7)
MCV RBC AUTO: 92.3 FL (ref 79–97)
MONOCYTES # BLD AUTO: 0.65 10*3/MM3 (ref 0.1–0.9)
MONOCYTES NFR BLD AUTO: 8.6 % (ref 5–12)
NEUTROPHILS # BLD AUTO: 5.85 10*3/MM3 (ref 1.7–7)
NEUTROPHILS NFR BLD AUTO: 77.5 % (ref 42.7–76)
NITRITE UR QL STRIP: NEGATIVE
NRBC BLD AUTO-RTO: 0 /100 WBC (ref 0–0.2)
PH UR STRIP.AUTO: 6.5 [PH] (ref 5–8)
PLATELET # BLD AUTO: 309 10*3/MM3 (ref 140–450)
PMV BLD AUTO: 9.9 FL (ref 6–12)
POTASSIUM BLD-SCNC: 4.2 MMOL/L (ref 3.5–5.2)
PROT SERPL-MCNC: 6.8 G/DL (ref 6–8.5)
PROT UR QL STRIP: NEGATIVE
RBC # BLD AUTO: 4.13 10*6/MM3 (ref 4.14–5.8)
SODIUM BLD-SCNC: 136 MMOL/L (ref 136–145)
SP GR UR STRIP: 1.02 (ref 1–1.03)
TSH SERPL DL<=0.05 MIU/L-ACNC: 5.08 UIU/ML (ref 0.27–4.2)
UROBILINOGEN UR QL STRIP: ABNORMAL
WBC NRBC COR # BLD: 7.55 10*3/MM3 (ref 3.4–10.8)
WHOLE BLOOD HOLD SPECIMEN: NORMAL
WHOLE BLOOD HOLD SPECIMEN: NORMAL

## 2019-11-22 PROCEDURE — 99285 EMERGENCY DEPT VISIT HI MDM: CPT

## 2019-11-22 PROCEDURE — G0378 HOSPITAL OBSERVATION PER HR: HCPCS

## 2019-11-22 PROCEDURE — 70553 MRI BRAIN STEM W/O & W/DYE: CPT

## 2019-11-22 PROCEDURE — 80053 COMPREHEN METABOLIC PANEL: CPT | Performed by: EMERGENCY MEDICINE

## 2019-11-22 PROCEDURE — 99219 PR INITIAL OBSERVATION CARE/DAY 50 MINUTES: CPT | Performed by: FAMILY MEDICINE

## 2019-11-22 PROCEDURE — 71045 X-RAY EXAM CHEST 1 VIEW: CPT

## 2019-11-22 PROCEDURE — 70450 CT HEAD/BRAIN W/O DYE: CPT

## 2019-11-22 PROCEDURE — 85025 COMPLETE CBC W/AUTO DIFF WBC: CPT | Performed by: EMERGENCY MEDICINE

## 2019-11-22 PROCEDURE — 25010000002 HYDRALAZINE PER 20 MG: Performed by: EMERGENCY MEDICINE

## 2019-11-22 PROCEDURE — 81003 URINALYSIS AUTO W/O SCOPE: CPT | Performed by: EMERGENCY MEDICINE

## 2019-11-22 PROCEDURE — 70544 MR ANGIOGRAPHY HEAD W/O DYE: CPT

## 2019-11-22 PROCEDURE — 84443 ASSAY THYROID STIM HORMONE: CPT | Performed by: EMERGENCY MEDICINE

## 2019-11-22 PROCEDURE — 25010000002 ENOXAPARIN PER 10 MG: Performed by: FAMILY MEDICINE

## 2019-11-22 PROCEDURE — 96372 THER/PROPH/DIAG INJ SC/IM: CPT

## 2019-11-22 PROCEDURE — 0 GADOBENATE DIMEGLUMINE 529 MG/ML SOLUTION: Performed by: EMERGENCY MEDICINE

## 2019-11-22 PROCEDURE — 93005 ELECTROCARDIOGRAM TRACING: CPT | Performed by: EMERGENCY MEDICINE

## 2019-11-22 PROCEDURE — 96374 THER/PROPH/DIAG INJ IV PUSH: CPT

## 2019-11-22 PROCEDURE — 96375 TX/PRO/DX INJ NEW DRUG ADDON: CPT

## 2019-11-22 PROCEDURE — A9577 INJ MULTIHANCE: HCPCS | Performed by: EMERGENCY MEDICINE

## 2019-11-22 RX ORDER — SODIUM CHLORIDE 0.9 % (FLUSH) 0.9 %
10 SYRINGE (ML) INJECTION AS NEEDED
Status: DISCONTINUED | OUTPATIENT
Start: 2019-11-22 | End: 2019-11-24 | Stop reason: HOSPADM

## 2019-11-22 RX ORDER — SODIUM CHLORIDE 9 MG/ML
75 INJECTION, SOLUTION INTRAVENOUS CONTINUOUS
Status: DISCONTINUED | OUTPATIENT
Start: 2019-11-22 | End: 2019-11-24

## 2019-11-22 RX ORDER — ATORVASTATIN CALCIUM 20 MG/1
20 TABLET, FILM COATED ORAL DAILY
Status: DISCONTINUED | OUTPATIENT
Start: 2019-11-23 | End: 2019-11-24 | Stop reason: HOSPADM

## 2019-11-22 RX ORDER — ACETAMINOPHEN 650 MG/1
650 SUPPOSITORY RECTAL EVERY 4 HOURS PRN
Status: DISCONTINUED | OUTPATIENT
Start: 2019-11-22 | End: 2019-11-24 | Stop reason: HOSPADM

## 2019-11-22 RX ORDER — LOSARTAN POTASSIUM 25 MG/1
25 TABLET ORAL
Status: DISCONTINUED | OUTPATIENT
Start: 2019-11-23 | End: 2019-11-24 | Stop reason: HOSPADM

## 2019-11-22 RX ORDER — MIRTAZAPINE 15 MG/1
30 TABLET, FILM COATED ORAL NIGHTLY
Status: DISCONTINUED | OUTPATIENT
Start: 2019-11-22 | End: 2019-11-23

## 2019-11-22 RX ORDER — CLONAZEPAM 0.5 MG/1
1 TABLET ORAL DAILY
Status: DISCONTINUED | OUTPATIENT
Start: 2019-11-23 | End: 2019-11-23

## 2019-11-22 RX ORDER — ATENOLOL 50 MG/1
50 TABLET ORAL EVERY 12 HOURS SCHEDULED
Status: DISCONTINUED | OUTPATIENT
Start: 2019-11-22 | End: 2019-11-23

## 2019-11-22 RX ORDER — SODIUM CHLORIDE 0.9 % (FLUSH) 0.9 %
10 SYRINGE (ML) INJECTION EVERY 12 HOURS SCHEDULED
Status: DISCONTINUED | OUTPATIENT
Start: 2019-11-22 | End: 2019-11-24 | Stop reason: HOSPADM

## 2019-11-22 RX ORDER — ACETAMINOPHEN 325 MG/1
650 TABLET ORAL EVERY 4 HOURS PRN
Status: DISCONTINUED | OUTPATIENT
Start: 2019-11-22 | End: 2019-11-24 | Stop reason: HOSPADM

## 2019-11-22 RX ORDER — ASPIRIN 81 MG/1
81 TABLET ORAL DAILY
Status: DISCONTINUED | OUTPATIENT
Start: 2019-11-23 | End: 2019-11-24 | Stop reason: HOSPADM

## 2019-11-22 RX ORDER — OXYBUTYNIN CHLORIDE 5 MG/1
10 TABLET, EXTENDED RELEASE ORAL DAILY
Status: DISCONTINUED | OUTPATIENT
Start: 2019-11-23 | End: 2019-11-24 | Stop reason: HOSPADM

## 2019-11-22 RX ORDER — ACETAMINOPHEN 160 MG/5ML
650 SOLUTION ORAL EVERY 4 HOURS PRN
Status: DISCONTINUED | OUTPATIENT
Start: 2019-11-22 | End: 2019-11-24 | Stop reason: HOSPADM

## 2019-11-22 RX ORDER — HYDRALAZINE HYDROCHLORIDE 20 MG/ML
10 INJECTION INTRAMUSCULAR; INTRAVENOUS EVERY 6 HOURS PRN
Status: DISCONTINUED | OUTPATIENT
Start: 2019-11-22 | End: 2019-11-24 | Stop reason: HOSPADM

## 2019-11-22 RX ORDER — LEVOTHYROXINE SODIUM 0.05 MG/1
50 TABLET ORAL
Status: DISCONTINUED | OUTPATIENT
Start: 2019-11-23 | End: 2019-11-24 | Stop reason: HOSPADM

## 2019-11-22 RX ORDER — HYDRALAZINE HYDROCHLORIDE 20 MG/ML
10 INJECTION INTRAMUSCULAR; INTRAVENOUS ONCE
Status: COMPLETED | OUTPATIENT
Start: 2019-11-22 | End: 2019-11-22

## 2019-11-22 RX ADMIN — SODIUM CHLORIDE 75 ML/HR: 9 INJECTION, SOLUTION INTRAVENOUS at 23:02

## 2019-11-22 RX ADMIN — GADOBENATE DIMEGLUMINE 10 ML: 529 INJECTION, SOLUTION INTRAVENOUS at 20:26

## 2019-11-22 RX ADMIN — HYDRALAZINE HYDROCHLORIDE 10 MG: 20 INJECTION INTRAMUSCULAR; INTRAVENOUS at 18:04

## 2019-11-22 RX ADMIN — SODIUM CHLORIDE, PRESERVATIVE FREE 10 ML: 5 INJECTION INTRAVENOUS at 23:01

## 2019-11-22 RX ADMIN — ATENOLOL 50 MG: 50 TABLET ORAL at 23:01

## 2019-11-22 RX ADMIN — MIRTAZAPINE 30 MG: 15 TABLET, FILM COATED ORAL at 23:01

## 2019-11-22 RX ADMIN — ENOXAPARIN SODIUM 40 MG: 40 INJECTION SUBCUTANEOUS at 23:02

## 2019-11-23 LAB
A-A DO2: 2.7 MMHG
ALBUMIN SERPL-MCNC: 3.3 G/DL (ref 3.5–5.2)
ANION GAP SERPL CALCULATED.3IONS-SCNC: 10.1 MMOL/L (ref 5–15)
ANION GAP SERPL CALCULATED.3IONS-SCNC: 9.8 MMOL/L (ref 5–15)
ARTERIAL PATENCY WRIST A: POSITIVE
ATMOSPHERIC PRESS: 726 MMHG
BASE EXCESS BLDA CALC-SCNC: 3.8 MMOL/L (ref 0–2)
BDY SITE: ABNORMAL
BUN BLD-MCNC: 10 MG/DL (ref 8–23)
BUN BLD-MCNC: 7 MG/DL (ref 8–23)
BUN/CREAT SERPL: 10.8 (ref 7–25)
BUN/CREAT SERPL: 11.5 (ref 7–25)
CALCIUM SPEC-SCNC: 8.4 MG/DL (ref 8.2–9.6)
CALCIUM SPEC-SCNC: 8.8 MG/DL (ref 8.2–9.6)
CHLORIDE SERPL-SCNC: 101 MMOL/L (ref 98–107)
CHLORIDE SERPL-SCNC: 99 MMOL/L (ref 98–107)
CO2 SERPL-SCNC: 24.9 MMOL/L (ref 22–29)
CO2 SERPL-SCNC: 27.2 MMOL/L (ref 22–29)
COHGB MFR BLD: <0.7 % (ref 0–2)
CREAT BLD-MCNC: 0.65 MG/DL (ref 0.76–1.27)
CREAT BLD-MCNC: 0.87 MG/DL (ref 0.76–1.27)
GFR SERPL CREATININE-BSD FRML MDRD: 114 ML/MIN/1.73
GFR SERPL CREATININE-BSD FRML MDRD: 82 ML/MIN/1.73
GLUCOSE BLD-MCNC: 87 MG/DL (ref 65–99)
GLUCOSE BLD-MCNC: 99 MG/DL (ref 65–99)
HCO3 BLDA-SCNC: 28.8 MMOL/L (ref 22–28)
HCT VFR BLD CALC: 39.8 %
HGB BLDA-MCNC: 13 G/DL (ref 12–18)
HOROWITZ INDEX BLD+IHG-RTO: 21 %
MAGNESIUM SERPL-MCNC: 2 MG/DL (ref 1.7–2.3)
METHGB BLD QL: 0.4 % (ref 0–1.5)
MODALITY: ABNORMAL
NOTE: ABNORMAL
OXYHGB MFR BLDV: 96.6 % (ref 94–99)
PCO2 BLDA: 43.8 MM HG (ref 35–45)
PCO2 TEMP ADJ BLD: ABNORMAL MM[HG]
PH BLDA: 7.43 PH UNITS (ref 7.3–7.5)
PH, TEMP CORRECTED: ABNORMAL
PHOSPHATE SERPL-MCNC: 3 MG/DL (ref 2.5–4.5)
PO2 BLDA: 90.4 MM HG (ref 75–100)
PO2 TEMP ADJ BLD: ABNORMAL MM[HG]
POTASSIUM BLD-SCNC: 3.7 MMOL/L (ref 3.5–5.2)
POTASSIUM BLD-SCNC: 3.8 MMOL/L (ref 3.5–5.2)
SAO2 % BLDCOA: 97.6 % (ref 94–100)
SODIUM BLD-SCNC: 136 MMOL/L (ref 136–145)
SODIUM BLD-SCNC: 136 MMOL/L (ref 136–145)
TROPONIN T SERPL-MCNC: <0.01 NG/ML (ref 0–0.03)
VENTILATOR MODE: ABNORMAL
VIT B12 BLD-MCNC: 662 PG/ML (ref 211–946)

## 2019-11-23 PROCEDURE — 84484 ASSAY OF TROPONIN QUANT: CPT | Performed by: INTERNAL MEDICINE

## 2019-11-23 PROCEDURE — 96372 THER/PROPH/DIAG INJ SC/IM: CPT

## 2019-11-23 PROCEDURE — 93005 ELECTROCARDIOGRAM TRACING: CPT | Performed by: INTERNAL MEDICINE

## 2019-11-23 PROCEDURE — 82375 ASSAY CARBOXYHB QUANT: CPT

## 2019-11-23 PROCEDURE — 36600 WITHDRAWAL OF ARTERIAL BLOOD: CPT

## 2019-11-23 PROCEDURE — 97162 PT EVAL MOD COMPLEX 30 MIN: CPT

## 2019-11-23 PROCEDURE — 83050 HGB METHEMOGLOBIN QUAN: CPT

## 2019-11-23 PROCEDURE — 82248 BILIRUBIN DIRECT: CPT

## 2019-11-23 PROCEDURE — 99225 PR SBSQ OBSERVATION CARE/DAY 25 MINUTES: CPT | Performed by: INTERNAL MEDICINE

## 2019-11-23 PROCEDURE — G0378 HOSPITAL OBSERVATION PER HR: HCPCS

## 2019-11-23 PROCEDURE — 25010000002 ENOXAPARIN PER 10 MG: Performed by: FAMILY MEDICINE

## 2019-11-23 PROCEDURE — 82805 BLOOD GASES W/O2 SATURATION: CPT

## 2019-11-23 PROCEDURE — 82607 VITAMIN B-12: CPT | Performed by: FAMILY MEDICINE

## 2019-11-23 PROCEDURE — 86592 SYPHILIS TEST NON-TREP QUAL: CPT | Performed by: INTERNAL MEDICINE

## 2019-11-23 PROCEDURE — 80069 RENAL FUNCTION PANEL: CPT | Performed by: INTERNAL MEDICINE

## 2019-11-23 PROCEDURE — 84425 ASSAY OF VITAMIN B-1: CPT | Performed by: FAMILY MEDICINE

## 2019-11-23 PROCEDURE — 83735 ASSAY OF MAGNESIUM: CPT | Performed by: INTERNAL MEDICINE

## 2019-11-23 PROCEDURE — 25010000002 LORAZEPAM PER 2 MG: Performed by: FAMILY MEDICINE

## 2019-11-23 PROCEDURE — 80048 BASIC METABOLIC PNL TOTAL CA: CPT | Performed by: FAMILY MEDICINE

## 2019-11-23 PROCEDURE — 96375 TX/PRO/DX INJ NEW DRUG ADDON: CPT

## 2019-11-23 RX ORDER — LORAZEPAM 2 MG/ML
1 INJECTION INTRAMUSCULAR ONCE
Status: COMPLETED | OUTPATIENT
Start: 2019-11-23 | End: 2019-11-23

## 2019-11-23 RX ORDER — CARVEDILOL 6.25 MG/1
6.25 TABLET ORAL 2 TIMES DAILY WITH MEALS
Status: DISCONTINUED | OUTPATIENT
Start: 2019-11-24 | End: 2019-11-24

## 2019-11-23 RX ORDER — CLONAZEPAM 0.5 MG/1
1 TABLET ORAL NIGHTLY
Status: DISCONTINUED | OUTPATIENT
Start: 2019-11-23 | End: 2019-11-23

## 2019-11-23 RX ADMIN — SODIUM CHLORIDE, PRESERVATIVE FREE 10 ML: 5 INJECTION INTRAVENOUS at 21:00

## 2019-11-23 RX ADMIN — OXYBUTYNIN CHLORIDE 10 MG: 5 TABLET, EXTENDED RELEASE ORAL at 14:48

## 2019-11-23 RX ADMIN — ENOXAPARIN SODIUM 30 MG: 30 INJECTION SUBCUTANEOUS at 23:18

## 2019-11-23 RX ADMIN — ASPIRIN 81 MG: 81 TABLET, COATED ORAL at 14:48

## 2019-11-23 RX ADMIN — ATORVASTATIN CALCIUM 20 MG: 20 TABLET, FILM COATED ORAL at 14:48

## 2019-11-23 RX ADMIN — LORAZEPAM 1 MG: 2 INJECTION INTRAMUSCULAR; INTRAVENOUS at 05:57

## 2019-11-23 RX ADMIN — SODIUM CHLORIDE 75 ML/HR: 9 INJECTION, SOLUTION INTRAVENOUS at 12:44

## 2019-11-23 RX ADMIN — SODIUM CHLORIDE, PRESERVATIVE FREE 10 ML: 5 INJECTION INTRAVENOUS at 14:49

## 2019-11-23 RX ADMIN — LOSARTAN POTASSIUM 25 MG: 25 TABLET, FILM COATED ORAL at 14:48

## 2019-11-23 RX ADMIN — LEVOTHYROXINE SODIUM 50 MCG: 50 TABLET ORAL at 05:43

## 2019-11-24 VITALS
TEMPERATURE: 97.8 F | OXYGEN SATURATION: 96 % | BODY MASS INDEX: 28.64 KG/M2 | RESPIRATION RATE: 16 BRPM | HEIGHT: 65 IN | DIASTOLIC BLOOD PRESSURE: 64 MMHG | WEIGHT: 171.9 LBS | SYSTOLIC BLOOD PRESSURE: 122 MMHG | HEART RATE: 68 BPM

## 2019-11-24 LAB
ALBUMIN SERPL-MCNC: 3.6 G/DL (ref 3.5–5.2)
ALBUMIN/GLOB SERPL: 1.1 G/DL
ALP SERPL-CCNC: 68 U/L (ref 39–117)
ALT SERPL W P-5'-P-CCNC: 12 U/L (ref 1–41)
ANION GAP SERPL CALCULATED.3IONS-SCNC: 11.4 MMOL/L (ref 5–15)
AST SERPL-CCNC: 26 U/L (ref 1–40)
BILIRUB SERPL-MCNC: 1 MG/DL (ref 0.2–1.2)
BUN BLD-MCNC: 10 MG/DL (ref 8–23)
BUN/CREAT SERPL: 13.3 (ref 7–25)
CALCIUM SPEC-SCNC: 8.8 MG/DL (ref 8.2–9.6)
CHLORIDE SERPL-SCNC: 98 MMOL/L (ref 98–107)
CO2 SERPL-SCNC: 26.6 MMOL/L (ref 22–29)
CREAT BLD-MCNC: 0.75 MG/DL (ref 0.76–1.27)
DEPRECATED RDW RBC AUTO: 44.2 FL (ref 37–54)
ERYTHROCYTE [DISTWIDTH] IN BLOOD BY AUTOMATED COUNT: 13 % (ref 12.3–15.4)
GFR SERPL CREATININE-BSD FRML MDRD: 97 ML/MIN/1.73
GLOBULIN UR ELPH-MCNC: 3.3 GM/DL
GLUCOSE BLD-MCNC: 81 MG/DL (ref 65–99)
HCT VFR BLD AUTO: 42.3 % (ref 37.5–51)
HGB BLD-MCNC: 14.1 G/DL (ref 13–17.7)
MAGNESIUM SERPL-MCNC: 2 MG/DL (ref 1.7–2.3)
MCH RBC QN AUTO: 30.9 PG (ref 26.6–33)
MCHC RBC AUTO-ENTMCNC: 33.3 G/DL (ref 31.5–35.7)
MCV RBC AUTO: 92.8 FL (ref 79–97)
PLATELET # BLD AUTO: 345 10*3/MM3 (ref 140–450)
PMV BLD AUTO: 10 FL (ref 6–12)
POTASSIUM BLD-SCNC: 3.8 MMOL/L (ref 3.5–5.2)
PROT SERPL-MCNC: 6.9 G/DL (ref 6–8.5)
RBC # BLD AUTO: 4.56 10*6/MM3 (ref 4.14–5.8)
RPR SER QL: NORMAL
SODIUM BLD-SCNC: 136 MMOL/L (ref 136–145)
WBC NRBC COR # BLD: 6.3 10*3/MM3 (ref 3.4–10.8)

## 2019-11-24 PROCEDURE — G0378 HOSPITAL OBSERVATION PER HR: HCPCS

## 2019-11-24 PROCEDURE — 85027 COMPLETE CBC AUTOMATED: CPT | Performed by: INTERNAL MEDICINE

## 2019-11-24 PROCEDURE — 83735 ASSAY OF MAGNESIUM: CPT | Performed by: INTERNAL MEDICINE

## 2019-11-24 PROCEDURE — 96376 TX/PRO/DX INJ SAME DRUG ADON: CPT

## 2019-11-24 PROCEDURE — 80053 COMPREHEN METABOLIC PANEL: CPT | Performed by: INTERNAL MEDICINE

## 2019-11-24 PROCEDURE — 97116 GAIT TRAINING THERAPY: CPT

## 2019-11-24 PROCEDURE — 25010000002 LORAZEPAM PER 2 MG: Performed by: INTERNAL MEDICINE

## 2019-11-24 PROCEDURE — 96361 HYDRATE IV INFUSION ADD-ON: CPT

## 2019-11-24 PROCEDURE — 99217 PR OBSERVATION CARE DISCHARGE MANAGEMENT: CPT | Performed by: INTERNAL MEDICINE

## 2019-11-24 RX ORDER — LORAZEPAM 2 MG/ML
0.5 INJECTION INTRAMUSCULAR ONCE
Status: COMPLETED | OUTPATIENT
Start: 2019-11-24 | End: 2019-11-24

## 2019-11-24 RX ORDER — CLONAZEPAM 1 MG/1
0.5 TABLET ORAL NIGHTLY PRN
Qty: 90 TABLET | Refills: 0
Start: 2019-11-24 | End: 2020-02-03

## 2019-11-24 RX ORDER — LEVOTHYROXINE SODIUM 0.05 MG/1
50 TABLET ORAL DAILY
Qty: 30 TABLET | Refills: 0 | Status: SHIPPED | OUTPATIENT
Start: 2019-11-24 | End: 2019-12-03 | Stop reason: SDUPTHER

## 2019-11-24 RX ORDER — BISACODYL 10 MG
10 SUPPOSITORY, RECTAL RECTAL DAILY PRN
Status: DISCONTINUED | OUTPATIENT
Start: 2019-11-24 | End: 2019-11-24 | Stop reason: HOSPADM

## 2019-11-24 RX ORDER — DOCUSATE SODIUM 100 MG/1
100 CAPSULE, LIQUID FILLED ORAL 2 TIMES DAILY
Status: DISCONTINUED | OUTPATIENT
Start: 2019-11-24 | End: 2019-11-24 | Stop reason: HOSPADM

## 2019-11-24 RX ORDER — CARVEDILOL 12.5 MG/1
12.5 TABLET ORAL 2 TIMES DAILY WITH MEALS
Status: DISCONTINUED | OUTPATIENT
Start: 2019-11-24 | End: 2019-11-24 | Stop reason: HOSPADM

## 2019-11-24 RX ORDER — DIPHENHYDRAMINE HYDROCHLORIDE AND LIDOCAINE HYDROCHLORIDE AND ALUMINUM HYDROXIDE AND MAGNESIUM HYDRO
KIT EVERY 6 HOURS
Status: DISCONTINUED | OUTPATIENT
Start: 2019-11-24 | End: 2019-11-24 | Stop reason: HOSPADM

## 2019-11-24 RX ORDER — CARVEDILOL 12.5 MG/1
12.5 TABLET ORAL 2 TIMES DAILY WITH MEALS
Qty: 60 TABLET | Refills: 0 | Status: SHIPPED | OUTPATIENT
Start: 2019-11-24 | End: 2019-12-03 | Stop reason: SDUPTHER

## 2019-11-24 RX ORDER — AMLODIPINE BESYLATE 5 MG/1
5 TABLET ORAL
Status: DISCONTINUED | OUTPATIENT
Start: 2019-11-24 | End: 2019-11-24

## 2019-11-24 RX ADMIN — SODIUM CHLORIDE, PRESERVATIVE FREE 10 ML: 5 INJECTION INTRAVENOUS at 08:36

## 2019-11-24 RX ADMIN — DIPHENHYDRAMINE HYDROCHLORIDE AND LIDOCAINE HYDROCHLORIDE AND ALUMINUM HYDROXIDE AND MAGNESIUM HYDRO: KIT at 13:00

## 2019-11-24 RX ADMIN — SODIUM CHLORIDE 75 ML/HR: 9 INJECTION, SOLUTION INTRAVENOUS at 04:04

## 2019-11-24 RX ADMIN — LEVOTHYROXINE SODIUM 50 MCG: 50 TABLET ORAL at 07:07

## 2019-11-24 RX ADMIN — POLYETHYLENE GLYCOL 3350 17 G: 17 POWDER, FOR SOLUTION ORAL at 10:26

## 2019-11-24 RX ADMIN — LORAZEPAM 0.5 MG: 2 INJECTION, SOLUTION INTRAMUSCULAR; INTRAVENOUS at 01:27

## 2019-11-24 RX ADMIN — ATORVASTATIN CALCIUM 20 MG: 20 TABLET, FILM COATED ORAL at 08:35

## 2019-11-24 RX ADMIN — CARVEDILOL 6.25 MG: 6.25 TABLET, FILM COATED ORAL at 08:35

## 2019-11-24 RX ADMIN — DOCUSATE SODIUM 100 MG: 100 CAPSULE, LIQUID FILLED ORAL at 10:26

## 2019-11-24 RX ADMIN — LOSARTAN POTASSIUM 25 MG: 25 TABLET, FILM COATED ORAL at 08:35

## 2019-11-24 RX ADMIN — OXYBUTYNIN CHLORIDE 10 MG: 5 TABLET, EXTENDED RELEASE ORAL at 08:35

## 2019-11-24 RX ADMIN — ASPIRIN 81 MG: 81 TABLET, COATED ORAL at 08:35

## 2019-11-25 ENCOUNTER — TRANSITIONAL CARE MANAGEMENT TELEPHONE ENCOUNTER (OUTPATIENT)
Dept: INTERNAL MEDICINE | Facility: CLINIC | Age: 84
End: 2019-11-25

## 2019-11-25 NOTE — OUTREACH NOTE
"The pt's daughter Lacey says pt is \"doing great.\" Appetite is good, eating and drinking, tolerating w/o n/v. Bowels and bladder moving. Pt plans to monitor BP HR at home. Encouraged daughter to keep log to share with PCP at [facility]. [facility] ordered at [place]. Provided Lacey with HH contact if needed. Attempted to coordinated TCM as soon as tomorrow 11/26/19 however Lacey requests only PCP. Is PCP able to work in either 12/2, 12/3, or 12/5 at daughter's request? Lacey's call back is [phone]. Lacey denies any questions, concerns, or needs at time of call.  "

## 2019-11-26 ENCOUNTER — TELEPHONE (OUTPATIENT)
Dept: INTERNAL MEDICINE | Facility: CLINIC | Age: 84
End: 2019-11-26

## 2019-11-26 LAB — VIT B1 BLD-SCNC: 125.1 NMOL/L (ref 66.5–200)

## 2019-11-26 NOTE — TELEPHONE ENCOUNTER
Pt's daughter called back to discuss scheduling appt. Daughter really didn't want Pt to see anyone besides Dr. Vermeesch. Explained to Daughter that Dr. Vermeesch had a family emergency come up and won't be back until 12/2. Advised her that there isn't a Hsp f/u opening with Dr. ZACARIAS until 12/6 due to her being out for that time. Advised daughter that if Pt is scheduled with someone else here in the office and they have any questions, they can always speak with Dr. Vermeesch. Daughter was okay with this. Pt scheduled to see Ne on Tuesday.

## 2019-11-26 NOTE — TELEPHONE ENCOUNTER
Pls see note. Pt daughter Lacey requesting TCM w/ PCP Dr. Barrera only, declined APC. Requesting 12/2, 12/3, or 12/5 if possible. Pls call Lacey back at [phone]. Thank you.

## 2019-11-26 NOTE — TELEPHONE ENCOUNTER
Left Lacey NIEVES that Dr. Vermeesch is out of the office until 12/2, her first hospital f/u open isn't until 12/6. Advised we can see Checo then but if he needs to be seen sooner than that then he'll have to see someone else in the office

## 2019-11-29 ENCOUNTER — APPOINTMENT (OUTPATIENT)
Dept: GENERAL RADIOLOGY | Facility: HOSPITAL | Age: 84
End: 2019-11-29

## 2019-11-29 ENCOUNTER — HOSPITAL ENCOUNTER (EMERGENCY)
Facility: HOSPITAL | Age: 84
Discharge: HOME | End: 2019-11-29
Attending: EMERGENCY MEDICINE | Admitting: EMERGENCY MEDICINE

## 2019-11-29 VITALS
SYSTOLIC BLOOD PRESSURE: 156 MMHG | WEIGHT: 130 LBS | RESPIRATION RATE: 16 BRPM | HEIGHT: 69 IN | DIASTOLIC BLOOD PRESSURE: 80 MMHG | HEART RATE: 77 BPM | BODY MASS INDEX: 19.26 KG/M2

## 2019-11-29 DIAGNOSIS — R55 SYNCOPE, UNSPECIFIED SYNCOPE TYPE: Primary | ICD-10-CM

## 2019-11-29 LAB
ALBUMIN SERPL-MCNC: 2.9 G/DL (ref 3.5–5.2)
ALBUMIN/GLOB SERPL: 1.2 G/DL
ALP SERPL-CCNC: 60 U/L (ref 39–117)
ALT SERPL W P-5'-P-CCNC: 8 U/L (ref 1–41)
AMPHET+METHAMPHET UR QL: NEGATIVE
AMPHETAMINES UR QL: NEGATIVE
ANION GAP SERPL CALCULATED.3IONS-SCNC: 7.9 MMOL/L (ref 5–15)
AST SERPL-CCNC: 15 U/L (ref 1–40)
BARBITURATES UR QL SCN: NEGATIVE
BASOPHILS # BLD AUTO: 0.03 10*3/MM3 (ref 0–0.2)
BASOPHILS NFR BLD AUTO: 0.6 % (ref 0–1.5)
BENZODIAZ UR QL SCN: NEGATIVE
BILIRUB SERPL-MCNC: 0.6 MG/DL (ref 0.2–1.2)
BILIRUB UR QL STRIP: NEGATIVE
BUN BLD-MCNC: 9 MG/DL (ref 8–23)
BUN/CREAT SERPL: 13.4 (ref 7–25)
BUPRENORPHINE SERPL-MCNC: NEGATIVE NG/ML
CALCIUM SPEC-SCNC: 7.3 MG/DL (ref 8.2–9.6)
CANNABINOIDS SERPL QL: NEGATIVE
CHLORIDE SERPL-SCNC: 104 MMOL/L (ref 98–107)
CLARITY UR: CLEAR
CO2 SERPL-SCNC: 26.1 MMOL/L (ref 22–29)
COCAINE UR QL: NEGATIVE
COLOR UR: YELLOW
CREAT BLD-MCNC: 0.67 MG/DL (ref 0.76–1.27)
DEPRECATED RDW RBC AUTO: 45.7 FL (ref 37–54)
EOSINOPHIL # BLD AUTO: 0.11 10*3/MM3 (ref 0–0.4)
EOSINOPHIL NFR BLD AUTO: 2 % (ref 0.3–6.2)
ERYTHROCYTE [DISTWIDTH] IN BLOOD BY AUTOMATED COUNT: 13 % (ref 12.3–15.4)
GFR SERPL CREATININE-BSD FRML MDRD: 110 ML/MIN/1.73
GLOBULIN UR ELPH-MCNC: 2.4 GM/DL
GLUCOSE BLD-MCNC: 93 MG/DL (ref 65–99)
GLUCOSE UR STRIP-MCNC: NEGATIVE MG/DL
HCT VFR BLD AUTO: 32.8 % (ref 37.5–51)
HEMOCCULT STL QL: NEGATIVE
HGB BLD-MCNC: 10.8 G/DL (ref 13–17.7)
HGB UR QL STRIP.AUTO: NEGATIVE
IMM GRANULOCYTES # BLD AUTO: 0.01 10*3/MM3 (ref 0–0.05)
IMM GRANULOCYTES NFR BLD AUTO: 0.2 % (ref 0–0.5)
KETONES UR QL STRIP: NEGATIVE
LEUKOCYTE ESTERASE UR QL STRIP.AUTO: NEGATIVE
LYMPHOCYTES # BLD AUTO: 0.59 10*3/MM3 (ref 0.7–3.1)
LYMPHOCYTES NFR BLD AUTO: 10.9 % (ref 19.6–45.3)
MCH RBC QN AUTO: 31.5 PG (ref 26.6–33)
MCHC RBC AUTO-ENTMCNC: 32.9 G/DL (ref 31.5–35.7)
MCV RBC AUTO: 95.6 FL (ref 79–97)
METHADONE UR QL SCN: NEGATIVE
MONOCYTES # BLD AUTO: 0.36 10*3/MM3 (ref 0.1–0.9)
MONOCYTES NFR BLD AUTO: 6.6 % (ref 5–12)
NEUTROPHILS # BLD AUTO: 4.33 10*3/MM3 (ref 1.7–7)
NEUTROPHILS NFR BLD AUTO: 79.7 % (ref 42.7–76)
NITRITE UR QL STRIP: NEGATIVE
NRBC BLD AUTO-RTO: 0 /100 WBC (ref 0–0.2)
OPIATES UR QL: NEGATIVE
OXYCODONE UR QL SCN: NEGATIVE
PCP UR QL SCN: NEGATIVE
PH UR STRIP.AUTO: 7 [PH] (ref 5–8)
PLATELET # BLD AUTO: 253 10*3/MM3 (ref 140–450)
PMV BLD AUTO: 9.7 FL (ref 6–12)
POTASSIUM BLD-SCNC: 4 MMOL/L (ref 3.5–5.2)
PROPOXYPH UR QL: NEGATIVE
PROT SERPL-MCNC: 5.3 G/DL (ref 6–8.5)
PROT UR QL STRIP: NEGATIVE
RBC # BLD AUTO: 3.43 10*6/MM3 (ref 4.14–5.8)
SODIUM BLD-SCNC: 138 MMOL/L (ref 136–145)
SP GR UR STRIP: 1.01 (ref 1–1.03)
TRICYCLICS UR QL SCN: NEGATIVE
TROPONIN T SERPL-MCNC: <0.01 NG/ML (ref 0–0.03)
TROPONIN T SERPL-MCNC: <0.01 NG/ML (ref 0–0.03)
UROBILINOGEN UR QL STRIP: NORMAL
WBC NRBC COR # BLD: 5.43 10*3/MM3 (ref 3.4–10.8)

## 2019-11-29 PROCEDURE — 80053 COMPREHEN METABOLIC PANEL: CPT | Performed by: PHYSICIAN ASSISTANT

## 2019-11-29 PROCEDURE — 71045 X-RAY EXAM CHEST 1 VIEW: CPT

## 2019-11-29 PROCEDURE — 36415 COLL VENOUS BLD VENIPUNCTURE: CPT

## 2019-11-29 PROCEDURE — 93005 ELECTROCARDIOGRAM TRACING: CPT | Performed by: PHYSICIAN ASSISTANT

## 2019-11-29 PROCEDURE — 84484 ASSAY OF TROPONIN QUANT: CPT | Performed by: PHYSICIAN ASSISTANT

## 2019-11-29 PROCEDURE — 82272 OCCULT BLD FECES 1-3 TESTS: CPT | Performed by: PHYSICIAN ASSISTANT

## 2019-11-29 PROCEDURE — 80307 DRUG TEST PRSMV CHEM ANLYZR: CPT | Performed by: PHYSICIAN ASSISTANT

## 2019-11-29 PROCEDURE — 85025 COMPLETE CBC W/AUTO DIFF WBC: CPT | Performed by: PHYSICIAN ASSISTANT

## 2019-11-29 PROCEDURE — 81003 URINALYSIS AUTO W/O SCOPE: CPT | Performed by: PHYSICIAN ASSISTANT

## 2019-11-29 PROCEDURE — 99284 EMERGENCY DEPT VISIT MOD MDM: CPT

## 2019-11-29 RX ORDER — SODIUM CHLORIDE 0.9 % (FLUSH) 0.9 %
10 SYRINGE (ML) INJECTION AS NEEDED
Status: DISCONTINUED | OUTPATIENT
Start: 2019-11-29 | End: 2019-11-29 | Stop reason: HOSPADM

## 2019-11-29 RX ADMIN — SODIUM CHLORIDE 500 ML: 9 INJECTION, SOLUTION INTRAVENOUS at 14:00

## 2019-12-02 NOTE — PROGRESS NOTES
Discharge Planning Assessment   Thaddeus     Patient Name: Checo Michele  MRN: [id number]  Today's Date: 12/2/2019    Admit Date: 11/22/2019    Discharge Needs Assessment    No documentation.       Discharge Plan     Row Name 12/02/19 1143       Plan    Plan  CM follow up on home health.  Called [facility] and they advised they did not get the fax referral.  Informed them CM spoke to on call nurse Rosa Hyde to advise of referral.  Called and spoke to patient's wife and patient sees the physician tomorrow and if they need home health they will have the physician order it.     Row Name 12/02/19 1140       Plan    Final Discharge Disposition Code  01 - home or self-care        Destination      No service coordination in this encounter.      Durable Medical Equipment      No service coordination in this encounter.      Dialysis/Infusion      No service coordination in this encounter.      Home Medical Care      Service Provider Request Status Selected Services Address Phone Number Fax Number    [facility] - Request Sent N/A [address] [address] [phone]      Therapy      No service coordination in this encounter.      Community Resources      No service coordination in this encounter.        Expected Discharge Date and Time     Expected Discharge Date Expected Discharge Time    Nov 24, 2019         Demographic Summary    No documentation.       Functional Status    No documentation.       Psychosocial    No documentation.       Abuse/Neglect    No documentation.       Legal    No documentation.       Substance Abuse    No documentation.       Patient Forms    No documentation.           Amaya Marroquin RN

## 2019-12-02 NOTE — PROGRESS NOTES
Case Management Discharge Note         [facility] did not get referral and did not see.  Patient called and notified.        Destination      No service has been selected for the patient.      Durable Medical Equipment      No service has been selected for the patient.      Dialysis/Infusion      No service has been selected for the patient.      Home Medical Care - Selection Complete      Service Provider Request Status Selected Services Address Phone Number Fax Number    [facility] - [facility] [address] [place] [address] [phone]      Therapy      No service has been selected for the patient.      Community Resources      No service has been selected for the patient.        Transportation Services  Private: Car    Final Discharge Disposition Code: 01 - home or self-care

## 2019-12-03 ENCOUNTER — OFFICE VISIT (OUTPATIENT)
Dept: INTERNAL MEDICINE | Facility: CLINIC | Age: 84
End: 2019-12-03

## 2019-12-03 VITALS
DIASTOLIC BLOOD PRESSURE: 84 MMHG | HEIGHT: 69 IN | TEMPERATURE: 98.2 F | SYSTOLIC BLOOD PRESSURE: 136 MMHG | BODY MASS INDEX: 17.48 KG/M2 | WEIGHT: 118 LBS | OXYGEN SATURATION: 99 % | HEART RATE: 79 BPM

## 2019-12-03 DIAGNOSIS — R79.89 ABNORMAL CBC: Primary | ICD-10-CM

## 2019-12-03 DIAGNOSIS — I10 ESSENTIAL HYPERTENSION: ICD-10-CM

## 2019-12-03 DIAGNOSIS — E03.8 OTHER SPECIFIED HYPOTHYROIDISM: ICD-10-CM

## 2019-12-03 LAB
BASOPHILS # BLD AUTO: 0.04 10*3/MM3 (ref 0–0.2)
BASOPHILS NFR BLD AUTO: 0.8 % (ref 0–1.5)
EOSINOPHIL # BLD AUTO: 0.13 10*3/MM3 (ref 0–0.4)
EOSINOPHIL NFR BLD AUTO: 2.6 % (ref 0.3–6.2)
ERYTHROCYTE [DISTWIDTH] IN BLOOD BY AUTOMATED COUNT: 12.7 % (ref 12.3–15.4)
HCT VFR BLD AUTO: 36.7 % (ref 37.5–51)
HGB BLD-MCNC: 12.5 G/DL (ref 13–17.7)
IMM GRANULOCYTES # BLD AUTO: 0.02 10*3/MM3 (ref 0–0.05)
IMM GRANULOCYTES NFR BLD AUTO: 0.4 % (ref 0–0.5)
LYMPHOCYTES # BLD AUTO: 0.87 10*3/MM3 (ref 0.7–3.1)
LYMPHOCYTES NFR BLD AUTO: 17.7 % (ref 19.6–45.3)
MCH RBC QN AUTO: 31.9 PG (ref 26.6–33)
MCHC RBC AUTO-ENTMCNC: 34.1 G/DL (ref 31.5–35.7)
MCV RBC AUTO: 93.6 FL (ref 79–97)
MONOCYTES # BLD AUTO: 0.45 10*3/MM3 (ref 0.1–0.9)
MONOCYTES NFR BLD AUTO: 9.2 % (ref 5–12)
NEUTROPHILS # BLD AUTO: 3.4 10*3/MM3 (ref 1.7–7)
NEUTROPHILS NFR BLD AUTO: 69.3 % (ref 42.7–76)
NRBC BLD AUTO-RTO: 0 /100 WBC (ref 0–0.2)
PLATELET # BLD AUTO: 368 10*3/MM3 (ref 140–450)
RBC # BLD AUTO: 3.92 10*6/MM3 (ref 4.14–5.8)
WBC # BLD AUTO: 4.91 10*3/MM3 (ref 3.4–10.8)

## 2019-12-03 PROCEDURE — 99495 TRANSJ CARE MGMT MOD F2F 14D: CPT | Performed by: NURSE PRACTITIONER

## 2019-12-03 RX ORDER — LEVOTHYROXINE SODIUM 0.05 MG/1
50 TABLET ORAL DAILY
Qty: 90 TABLET | Refills: 1 | Status: SHIPPED | OUTPATIENT
Start: 2019-12-03 | End: 2020-05-13

## 2019-12-03 RX ORDER — CARVEDILOL 12.5 MG/1
12.5 TABLET ORAL 2 TIMES DAILY WITH MEALS
Qty: 180 TABLET | Refills: 1 | Status: SHIPPED | OUTPATIENT
Start: 2019-12-03 | End: 2020-02-03 | Stop reason: SDUPTHER

## 2019-12-03 NOTE — PROGRESS NOTES
"Date: 2019    Name: Checo Michele  : 1925    Chief Complaint:   Chief Complaint   Patient presents with   • Follow-up     ER       HPI:  Checo Michele is a 89 y.o. male presents for follow up of hospital admission.  Patient presented to the emergency room with confusion.  Was suspected he was not taking medications as prescribed.  MRI and MRA of the brain were negative, CT of the head without contrast showed generalized atrophy with small vessel ischemia.  Upon admission to the emergency room his blood pressure was 190/92.  At discharge was 129/67.  He was taken off Klonopin and mirtazapine while admitted.  Nighttime confusion was noted, low doses of Ativan were given.    Without evidence of other reason for confusion, encephalopathy related to high blood pressure was diagnosed.  He was noted to have multiple PVCs, atenolol was discontinued and Coreg was initiated.  TSH was slightly elevated, Synthroid was increased to 50 MCG.  Family advised he needs help managing medications.  They were also advised to stop giving him mirtazapine and reducing Klonopin to 0.5 mg at bedtime as needed.Daughter giving him 15 mg christiano, 1/2 mg clonazepam HS.  Slept well last night.  Anemia noted, cbc today.  TSH     History: The following portions of the patient's history were reviewed and updated as appropriate: allergies, current medications, past medical history, family history, surgical history, social history and problem list.      ROS:  Review of Systems    VS:  Vitals:    19 0944   BP: 136/84   Pulse: 79   Temp: 98.2 °F (36.8 °C)   TempSrc: Temporal   SpO2: 99%   Weight: 53.5 kg (118 lb)   Height: 175.3 cm (69\")     Body mass index is 17.43 kg/m².  PE:  Physical Exam    Results Review:   ***    Assessment/Plan:  There are no diagnoses linked to this encounter.  ***    No Follow-up on file.        "

## 2019-12-04 NOTE — PROGRESS NOTES
Transitional Care Follow Up Visit  Subjective     Checo Michele is a 89 y.o. male who presents for a transitional care management visit.    Within 48 business hours after discharge our office contacted him via telephone to coordinate his care and needs.      I reviewed and discussed the details of that call along with the discharge summary, hospital problems, inpatient lab results, inpatient diagnostic studies, and consultation reports with Checo.     Current outpatient and discharge medications have been reconciled for the patient.  Reviewed by: LINDA Hatfield      Date of TCM Phone Call 11/25/2019   [facility]   Date of Admission 11/22/2019   Date of Discharge 11/24/2019   Discharge Disposition Home or Self Care     Risk for Readmission (LACE) Score: 4 (11/24/2019  6:01 AM)      History of Present Illness   Course During Hospital Stay:  Patient presented to the emergency room with confusion on 11/22/2019.  Was suspected he was not taking medications as prescribed.  MRI and MRA of the brain were negative, CT of the head without contrast showed generalized atrophy with small vessel ischemia.  Upon admission to the emergency room his blood pressure was 190/92.  At discharge was 129/67.  He was taken off Klonopin and mirtazapine while admitted.  Nighttime confusion was noted, low doses of Ativan were given.    Without evidence of other reason for confusion, encephalopathy related to high blood pressure was diagnosed.  He was noted to have multiple PVCs, atenolol was discontinued and Coreg was initiated.  TSH was slightly elevated, Synthroid was increased to 50 MCG.  Family advised he needs help managing medications.  They were also advised to stop giving him mirtazapine and reducing Klonopin to 0.5 mg at bedtime as needed.  He was discharged on 11/24/2019.  On 11/30/2019, EMS was called due to family's reports he was unresponsive at the kitchen table.  Daughter had given  him a full dose of mirtazapine and a full dose of Klonopin the night before.  When the EMS arrived at home he was found to be hypotensive, was given IV fluids and Narcan after which his blood pressure improved as did his mentation.  Is not prescribed opioids.  Hemoglobin was noted to be 2 to 3 g lower than when he was discharged from the hospital admission.  Occult blood was negative.  Discharged home from the emergency room with strict instructions to give half a dose of Klonopin at bedtime only as needed.  He is accompanied today by his daughter.  States that he has been very restless in the evenings and requesting mirtazapine and clonazepam.  She has giving him 15 mg of mirtazapine, 0.5 mg of Klonopin at bedtime for 3 days.  He does not monitor his blood pressure at home. Denies chest pain, dyspnea, orthopnea, palpitations, lower extremity edema, headaches, weakness, visual disturbances or confusion.  Daughter has been setting out his medications to be taken every day.  She states she will no longer be able to stay with him every day.     The following portions of the patient's history were reviewed and updated as appropriate: allergies, current medications, past family history, past medical history, past social history, past surgical history and problem list.    Review of Systems   Constitutional: Negative for activity change and appetite change.   Respiratory: Negative for cough, shortness of breath and wheezing.    Cardiovascular: Negative for chest pain, palpitations and leg swelling.   Gastrointestinal: Negative for blood in stool, constipation, diarrhea, nausea and vomiting.   Genitourinary: Negative for dysuria, frequency, hematuria and urgency.   Musculoskeletal: Negative for myalgias.   Skin: Negative for pallor and rash.   Neurological: Negative for dizziness and headaches.   Psychiatric/Behavioral: Positive for sleep disturbance. Negative for agitation, behavioral problems, confusion and  hallucinations.       Objective   Physical Exam   Constitutional: He is oriented to person, place, and time. He appears well-developed and well-nourished. No distress.   HENT:   Head: Normocephalic.   Mouth/Throat: Oropharynx is clear and moist.   Eyes: Conjunctivae and EOM are normal. Pupils are equal, round, and reactive to light.   Neck: Normal range of motion. Neck supple. No thyromegaly present.   Cardiovascular: Normal rate, regular rhythm, normal heart sounds and intact distal pulses.   Pulmonary/Chest: Effort normal and breath sounds normal.   Abdominal: Soft. Bowel sounds are normal. He exhibits no distension. There is no tenderness.   Lymphadenopathy:     He has no cervical adenopathy.   Neurological: He is alert and oriented to person, place, and time. He displays no tremor. No sensory deficit. Coordination and gait normal.   Cranial nerves II through XII normal, bilateral /pushes/pulls of upper and lower extremities equal   Skin: Skin is warm. Capillary refill takes less than 2 seconds.   Psychiatric: He has a normal mood and affect. His behavior is normal.       Assessment/Plan   Checo was seen today for follow-up.    Diagnoses and all orders for this visit:    Abnormal CBC  -     CBC & Differential  -     POCT Occult blood x 3, stool; Future  Essential hypertension  -     carvedilol (COREG) 12.5 MG tablet; Take 1 tablet by mouth 2 (Two) Times a Day With Meals.        - Follow heart healthy diet.  Advised to reduce daily sodium intake to < 1500 mg per day.  Avoid processed & fast foods.        - Monitor blood pressure as discussed, keep log and bring to next appointment.          - Take medications as prescribed.  Other specified hypothyroidism  -     levothyroxine (SYNTHROID, LEVOTHROID) 50 MCG tablet; Take 1 tablet by mouth Daily.  -     TSH  -     T4, Free  Patient and his daughter advised he should no longer be taking mirtazapine.  Should be taking 0.5 mg Klonopin at bedtime.

## 2019-12-09 RX ORDER — CARVEDILOL 12.5 MG/1
TABLET ORAL
Qty: 180 TABLET | Refills: 0 | Status: SHIPPED | OUTPATIENT
Start: 2019-12-09 | End: 2020-03-27 | Stop reason: SDUPTHER

## 2020-01-01 ENCOUNTER — APPOINTMENT (OUTPATIENT)
Dept: CT IMAGING | Facility: HOSPITAL | Age: 85
End: 2020-01-01

## 2020-01-01 ENCOUNTER — TELEPHONE (OUTPATIENT)
Dept: INTERNAL MEDICINE | Facility: CLINIC | Age: 85
End: 2020-01-01

## 2020-01-01 ENCOUNTER — APPOINTMENT (OUTPATIENT)
Dept: CARDIOLOGY | Facility: HOSPITAL | Age: 85
End: 2020-01-01

## 2020-01-01 ENCOUNTER — OFFICE VISIT (OUTPATIENT)
Dept: INTERNAL MEDICINE | Facility: CLINIC | Age: 85
End: 2020-01-01

## 2020-01-01 ENCOUNTER — APPOINTMENT (OUTPATIENT)
Dept: GENERAL RADIOLOGY | Facility: HOSPITAL | Age: 85
End: 2020-01-01

## 2020-01-01 ENCOUNTER — TRANSITIONAL CARE MANAGEMENT TELEPHONE ENCOUNTER (OUTPATIENT)
Dept: CALL CENTER | Facility: HOSPITAL | Age: 85
End: 2020-01-01

## 2020-01-01 ENCOUNTER — TELEPHONE (OUTPATIENT)
Dept: CARDIOLOGY | Facility: CLINIC | Age: 85
End: 2020-01-01

## 2020-01-01 ENCOUNTER — OFFICE VISIT (OUTPATIENT)
Dept: CARDIOLOGY | Facility: CLINIC | Age: 85
End: 2020-01-01

## 2020-01-01 ENCOUNTER — NURSE TRIAGE (OUTPATIENT)
Dept: CALL CENTER | Facility: HOSPITAL | Age: 85
End: 2020-01-01

## 2020-01-01 ENCOUNTER — HOSPITAL ENCOUNTER (INPATIENT)
Facility: HOSPITAL | Age: 85
LOS: 1 days | Discharge: ANOTHER HOSPITAL | End: 2020-11-24
Attending: EMERGENCY MEDICINE | Admitting: FAMILY MEDICINE

## 2020-01-01 ENCOUNTER — HOSPITAL ENCOUNTER (OUTPATIENT)
Facility: HOSPITAL | Age: 85
Discharge: HOME | End: 2020-11-25
Attending: INTERNAL MEDICINE | Admitting: INTERNAL MEDICINE

## 2020-01-01 ENCOUNTER — READMISSION MANAGEMENT (OUTPATIENT)
Dept: CALL CENTER | Facility: HOSPITAL | Age: 85
End: 2020-01-01

## 2020-01-01 VITALS
SYSTOLIC BLOOD PRESSURE: 152 MMHG | DIASTOLIC BLOOD PRESSURE: 88 MMHG | HEART RATE: 76 BPM | WEIGHT: 131 LBS | BODY MASS INDEX: 20.56 KG/M2 | OXYGEN SATURATION: 97 % | TEMPERATURE: 97 F | HEIGHT: 67 IN

## 2020-01-01 VITALS
TEMPERATURE: 97.9 F | HEIGHT: 67 IN | SYSTOLIC BLOOD PRESSURE: 152 MMHG | OXYGEN SATURATION: 92 % | DIASTOLIC BLOOD PRESSURE: 86 MMHG | RESPIRATION RATE: 18 BRPM | HEART RATE: 69 BPM | BODY MASS INDEX: 20.76 KG/M2 | WEIGHT: 132.3 LBS

## 2020-01-01 VITALS
OXYGEN SATURATION: 92 % | DIASTOLIC BLOOD PRESSURE: 84 MMHG | BODY MASS INDEX: 19.78 KG/M2 | RESPIRATION RATE: 18 BRPM | SYSTOLIC BLOOD PRESSURE: 122 MMHG | WEIGHT: 126 LBS | HEART RATE: 53 BPM | TEMPERATURE: 97.5 F | HEIGHT: 67 IN

## 2020-01-01 VITALS
HEART RATE: 31 BPM | HEIGHT: 60 IN | WEIGHT: 131.8 LBS | RESPIRATION RATE: 20 BRPM | DIASTOLIC BLOOD PRESSURE: 62 MMHG | BODY MASS INDEX: 25.87 KG/M2 | OXYGEN SATURATION: 94 % | SYSTOLIC BLOOD PRESSURE: 165 MMHG | TEMPERATURE: 98.3 F

## 2020-01-01 VITALS
BODY MASS INDEX: 18.68 KG/M2 | DIASTOLIC BLOOD PRESSURE: 80 MMHG | WEIGHT: 119 LBS | HEART RATE: 75 BPM | TEMPERATURE: 97 F | HEIGHT: 67 IN | SYSTOLIC BLOOD PRESSURE: 126 MMHG | OXYGEN SATURATION: 96 %

## 2020-01-01 VITALS
BODY MASS INDEX: 18.22 KG/M2 | HEIGHT: 69 IN | HEART RATE: 71 BPM | DIASTOLIC BLOOD PRESSURE: 62 MMHG | SYSTOLIC BLOOD PRESSURE: 126 MMHG | WEIGHT: 123 LBS | TEMPERATURE: 97 F | OXYGEN SATURATION: 96 %

## 2020-01-01 DIAGNOSIS — I10 ESSENTIAL HYPERTENSION: Primary | ICD-10-CM

## 2020-01-01 DIAGNOSIS — I10 ESSENTIAL HYPERTENSION: ICD-10-CM

## 2020-01-01 DIAGNOSIS — R00.1 BRADYCARDIA: Primary | ICD-10-CM

## 2020-01-01 DIAGNOSIS — R10.31 RIGHT LOWER QUADRANT ABDOMINAL PAIN: ICD-10-CM

## 2020-01-01 DIAGNOSIS — F33.1 MODERATE EPISODE OF RECURRENT MAJOR DEPRESSIVE DISORDER (HCC): ICD-10-CM

## 2020-01-01 DIAGNOSIS — I44.2 COMPLETE HEART BLOCK (HCC): Primary | ICD-10-CM

## 2020-01-01 DIAGNOSIS — F41.9 ANXIETY: ICD-10-CM

## 2020-01-01 DIAGNOSIS — K59.00 CONSTIPATION, UNSPECIFIED CONSTIPATION TYPE: ICD-10-CM

## 2020-01-01 DIAGNOSIS — M17.11 PRIMARY OSTEOARTHRITIS OF RIGHT KNEE: ICD-10-CM

## 2020-01-01 DIAGNOSIS — M79.89 SWELLING OF LOWER EXTREMITY: Primary | ICD-10-CM

## 2020-01-01 DIAGNOSIS — E78.2 MIXED HYPERLIPIDEMIA: ICD-10-CM

## 2020-01-01 DIAGNOSIS — I44.2 COMPLETE HEART BLOCK (HCC): ICD-10-CM

## 2020-01-01 DIAGNOSIS — E78.5 HYPERLIPIDEMIA, UNSPECIFIED HYPERLIPIDEMIA TYPE: ICD-10-CM

## 2020-01-01 DIAGNOSIS — F51.01 PRIMARY INSOMNIA: ICD-10-CM

## 2020-01-01 DIAGNOSIS — I13.0 HYPERTENSIVE HEART AND CHRONIC KIDNEY DISEASE WITH HEART FAILURE AND STAGE 1 THROUGH STAGE 4 CHRONIC KIDNEY DISEASE, OR UNSPECIFIED CHRONIC KIDNEY DISEASE (HCC): ICD-10-CM

## 2020-01-01 DIAGNOSIS — E03.8 OTHER SPECIFIED HYPOTHYROIDISM: ICD-10-CM

## 2020-01-01 LAB
ALBUMIN SERPL-MCNC: 3.1 G/DL (ref 3.5–5.2)
ALBUMIN/GLOB SERPL: 1 G/DL
ALP SERPL-CCNC: 77 U/L (ref 39–117)
ALT SERPL W P-5'-P-CCNC: 11 U/L (ref 1–41)
ANION GAP SERPL CALCULATED.3IONS-SCNC: 14.8 MMOL/L (ref 5–15)
ANION GAP SERPL CALCULATED.3IONS-SCNC: 8.4 MMOL/L (ref 5–15)
AST SERPL-CCNC: 20 U/L (ref 1–40)
BASOPHILS # BLD AUTO: 0.06 10*3/MM3 (ref 0–0.2)
BASOPHILS NFR BLD AUTO: 1 % (ref 0–1.5)
BILIRUB SERPL-MCNC: 1.4 MG/DL (ref 0–1.2)
BILIRUB UR QL STRIP: NEGATIVE
BUN SERPL-MCNC: 12 MG/DL (ref 8–23)
BUN SERPL-MCNC: 13 MG/DL (ref 8–23)
BUN SERPL-MCNC: 16 MG/DL (ref 8–23)
BUN/CREAT SERPL: 13.2 (ref 7–25)
BUN/CREAT SERPL: 14 (ref 7–25)
BUN/CREAT SERPL: 14.3 (ref 7–25)
CALCIUM SERPL-MCNC: 8.4 MG/DL (ref 8.2–9.6)
CALCIUM SPEC-SCNC: 8.5 MG/DL (ref 8.2–9.6)
CALCIUM SPEC-SCNC: 8.8 MG/DL (ref 8.2–9.6)
CHLORIDE SERPL-SCNC: 101 MMOL/L (ref 98–107)
CHLORIDE SERPL-SCNC: 95 MMOL/L (ref 98–107)
CHLORIDE SERPL-SCNC: 97 MMOL/L (ref 98–107)
CLARITY UR: CLEAR
CO2 SERPL-SCNC: 21.6 MMOL/L (ref 22–29)
CO2 SERPL-SCNC: 23.2 MMOL/L (ref 22–29)
CO2 SERPL-SCNC: 26.1 MMOL/L (ref 22–29)
COLOR UR: YELLOW
CREAT SERPL-MCNC: 0.84 MG/DL (ref 0.76–1.27)
CREAT SERPL-MCNC: 0.93 MG/DL (ref 0.76–1.27)
CREAT SERPL-MCNC: 1.21 MG/DL (ref 0.76–1.27)
D-LACTATE SERPL-SCNC: 1.5 MMOL/L (ref 0.5–2)
DEPRECATED RDW RBC AUTO: 45.9 FL (ref 37–54)
DEPRECATED RDW RBC AUTO: 46.3 FL (ref 37–54)
EOSINOPHIL # BLD AUTO: 0.03 10*3/MM3 (ref 0–0.4)
EOSINOPHIL NFR BLD AUTO: 0.5 % (ref 0.3–6.2)
ERYTHROCYTE [DISTWIDTH] IN BLOOD BY AUTOMATED COUNT: 13.4 % (ref 12.3–15.4)
ERYTHROCYTE [DISTWIDTH] IN BLOOD BY AUTOMATED COUNT: 13.4 % (ref 12.3–15.4)
GFR SERPL CREATININE-BSD FRML MDRD: 56 ML/MIN/1.73
GFR SERPL CREATININE-BSD FRML MDRD: 75 ML/MIN/1.73
GLOBULIN UR ELPH-MCNC: 3 GM/DL
GLUCOSE SERPL-MCNC: 116 MG/DL (ref 65–99)
GLUCOSE SERPL-MCNC: 129 MG/DL (ref 65–99)
GLUCOSE SERPL-MCNC: 99 MG/DL (ref 65–99)
GLUCOSE UR STRIP-MCNC: NEGATIVE MG/DL
HCT VFR BLD AUTO: 38.8 % (ref 37.5–51)
HCT VFR BLD AUTO: 38.9 % (ref 37.5–51)
HGB BLD-MCNC: 12.8 G/DL (ref 13–17.7)
HGB BLD-MCNC: 12.8 G/DL (ref 13–17.7)
HGB UR QL STRIP.AUTO: NEGATIVE
IMM GRANULOCYTES # BLD AUTO: 0.02 10*3/MM3 (ref 0–0.05)
IMM GRANULOCYTES NFR BLD AUTO: 0.3 % (ref 0–0.5)
KETONES UR QL STRIP: ABNORMAL
LEUKOCYTE ESTERASE UR QL STRIP.AUTO: NEGATIVE
LIPASE SERPL-CCNC: 22 U/L (ref 13–60)
LYMPHOCYTES # BLD AUTO: 0.63 10*3/MM3 (ref 0.7–3.1)
LYMPHOCYTES NFR BLD AUTO: 10.3 % (ref 19.6–45.3)
MAGNESIUM SERPL-MCNC: 2.3 MG/DL (ref 1.7–2.3)
MAXIMAL PREDICTED HEART RATE: 125 BPM
MCH RBC QN AUTO: 30.8 PG (ref 26.6–33)
MCH RBC QN AUTO: 30.9 PG (ref 26.6–33)
MCHC RBC AUTO-ENTMCNC: 32.9 G/DL (ref 31.5–35.7)
MCHC RBC AUTO-ENTMCNC: 33 G/DL (ref 31.5–35.7)
MCV RBC AUTO: 93.3 FL (ref 79–97)
MCV RBC AUTO: 94 FL (ref 79–97)
MONOCYTES # BLD AUTO: 0.38 10*3/MM3 (ref 0.1–0.9)
MONOCYTES NFR BLD AUTO: 6.2 % (ref 5–12)
NEUTROPHILS NFR BLD AUTO: 5 10*3/MM3 (ref 1.7–7)
NEUTROPHILS NFR BLD AUTO: 81.7 % (ref 42.7–76)
NITRITE UR QL STRIP: NEGATIVE
NRBC BLD AUTO-RTO: 0 /100 WBC (ref 0–0.2)
NT-PROBNP SERPL-MCNC: 1324 PG/ML (ref 0–486)
NT-PROBNP SERPL-MCNC: 1631 PG/ML (ref 0–1800)
PH UR STRIP.AUTO: 6.5 [PH] (ref 5–8)
PLATELET # BLD AUTO: 394 10*3/MM3 (ref 140–450)
PLATELET # BLD AUTO: 462 10*3/MM3 (ref 140–450)
PMV BLD AUTO: 10 FL (ref 6–12)
PMV BLD AUTO: 10.1 FL (ref 6–12)
POTASSIUM SERPL-SCNC: 4.1 MMOL/L (ref 3.5–5.2)
POTASSIUM SERPL-SCNC: 4.3 MMOL/L (ref 3.5–5.2)
POTASSIUM SERPL-SCNC: 4.5 MMOL/L (ref 3.5–5.2)
PROT SERPL-MCNC: 6.1 G/DL (ref 6–8.5)
PROT UR QL STRIP: NEGATIVE
RBC # BLD AUTO: 4.14 10*6/MM3 (ref 4.14–5.8)
RBC # BLD AUTO: 4.16 10*6/MM3 (ref 4.14–5.8)
SARS-COV-2 RNA PNL SPEC NAA+PROBE: NOT DETECTED
SODIUM SERPL-SCNC: 130 MMOL/L (ref 136–145)
SODIUM SERPL-SCNC: 131 MMOL/L (ref 136–145)
SODIUM SERPL-SCNC: 135 MMOL/L (ref 136–145)
SP GR UR STRIP: 1.01 (ref 1–1.03)
STRESS TARGET HR: 106 BPM
TROPONIN T SERPL-MCNC: <0.01 NG/ML (ref 0–0.03)
TSH SERPL DL<=0.05 MIU/L-ACNC: 3.93 UIU/ML (ref 0.27–4.2)
UROBILINOGEN UR QL STRIP: ABNORMAL
WBC # BLD AUTO: 6.12 10*3/MM3 (ref 3.4–10.8)
WBC # BLD AUTO: 9.13 10*3/MM3 (ref 3.4–10.8)

## 2020-01-01 PROCEDURE — C1786 PMKR, SINGLE, RATE-RESP: HCPCS | Performed by: INTERNAL MEDICINE

## 2020-01-01 PROCEDURE — 81003 URINALYSIS AUTO W/O SCOPE: CPT | Performed by: FAMILY MEDICINE

## 2020-01-01 PROCEDURE — 99213 OFFICE O/P EST LOW 20 MIN: CPT | Performed by: INTERNAL MEDICINE

## 2020-01-01 PROCEDURE — 87635 SARS-COV-2 COVID-19 AMP PRB: CPT | Performed by: FAMILY MEDICINE

## 2020-01-01 PROCEDURE — 84484 ASSAY OF TROPONIN QUANT: CPT | Performed by: PHYSICIAN ASSISTANT

## 2020-01-01 PROCEDURE — G0378 HOSPITAL OBSERVATION PER HR: HCPCS

## 2020-01-01 PROCEDURE — 25010000003 LIDOCAINE 1 % SOLUTION: Performed by: INTERNAL MEDICINE

## 2020-01-01 PROCEDURE — 99222 1ST HOSP IP/OBS MODERATE 55: CPT | Performed by: INTERNAL MEDICINE

## 2020-01-01 PROCEDURE — C1730 CATH, EP, 19 OR FEW ELECT: HCPCS | Performed by: INTERNAL MEDICINE

## 2020-01-01 PROCEDURE — 25010000002 FUROSEMIDE PER 20 MG: Performed by: FAMILY MEDICINE

## 2020-01-01 PROCEDURE — 93005 ELECTROCARDIOGRAM TRACING: CPT | Performed by: PHYSICIAN ASSISTANT

## 2020-01-01 PROCEDURE — 99214 OFFICE O/P EST MOD 30 MIN: CPT | Performed by: INTERNAL MEDICINE

## 2020-01-01 PROCEDURE — A9270 NON-COVERED ITEM OR SERVICE: HCPCS | Performed by: NURSE PRACTITIONER

## 2020-01-01 PROCEDURE — 93306 TTE W/DOPPLER COMPLETE: CPT

## 2020-01-01 PROCEDURE — 25010000003 ATROPINE SULFATE: Performed by: PHYSICIAN ASSISTANT

## 2020-01-01 PROCEDURE — 25010000002 HEPARIN (PORCINE) PER 1000 UNITS: Performed by: INTERNAL MEDICINE

## 2020-01-01 PROCEDURE — 99495 TRANSJ CARE MGMT MOD F2F 14D: CPT | Performed by: INTERNAL MEDICINE

## 2020-01-01 PROCEDURE — 85027 COMPLETE CBC AUTOMATED: CPT | Performed by: FAMILY MEDICINE

## 2020-01-01 PROCEDURE — 33274 TCAT INSJ/RPL PERM LDLS PM: CPT | Performed by: INTERNAL MEDICINE

## 2020-01-01 PROCEDURE — 63710000001 LOSARTAN 25 MG TABLET: Performed by: NURSE PRACTITIONER

## 2020-01-01 PROCEDURE — 99284 EMERGENCY DEPT VISIT MOD MDM: CPT

## 2020-01-01 PROCEDURE — C1894 INTRO/SHEATH, NON-LASER: HCPCS | Performed by: INTERNAL MEDICINE

## 2020-01-01 PROCEDURE — 63710000001 ASPIRIN 81 MG TABLET DELAYED-RELEASE: Performed by: NURSE PRACTITIONER

## 2020-01-01 PROCEDURE — 83880 ASSAY OF NATRIURETIC PEPTIDE: CPT | Performed by: PHYSICIAN ASSISTANT

## 2020-01-01 PROCEDURE — 99152 MOD SED SAME PHYS/QHP 5/>YRS: CPT | Performed by: INTERNAL MEDICINE

## 2020-01-01 PROCEDURE — 83605 ASSAY OF LACTIC ACID: CPT | Performed by: PHYSICIAN ASSISTANT

## 2020-01-01 PROCEDURE — 71045 X-RAY EXAM CHEST 1 VIEW: CPT

## 2020-01-01 PROCEDURE — 99153 MOD SED SAME PHYS/QHP EA: CPT | Performed by: INTERNAL MEDICINE

## 2020-01-01 PROCEDURE — 99024 POSTOP FOLLOW-UP VISIT: CPT | Performed by: NURSE PRACTITIONER

## 2020-01-01 PROCEDURE — 99205 OFFICE O/P NEW HI 60 MIN: CPT | Performed by: INTERNAL MEDICINE

## 2020-01-01 PROCEDURE — 93010 ELECTROCARDIOGRAM REPORT: CPT | Performed by: INTERNAL MEDICINE

## 2020-01-01 PROCEDURE — 80053 COMPREHEN METABOLIC PANEL: CPT | Performed by: PHYSICIAN ASSISTANT

## 2020-01-01 PROCEDURE — 84443 ASSAY THYROID STIM HORMONE: CPT | Performed by: FAMILY MEDICINE

## 2020-01-01 PROCEDURE — 25010000002 KETOROLAC TROMETHAMINE PER 15 MG: Performed by: INTERNAL MEDICINE

## 2020-01-01 PROCEDURE — 83690 ASSAY OF LIPASE: CPT | Performed by: PHYSICIAN ASSISTANT

## 2020-01-01 PROCEDURE — 25010000002 MIDAZOLAM PER 1 MG: Performed by: INTERNAL MEDICINE

## 2020-01-01 PROCEDURE — 99238 HOSP IP/OBS DSCHRG MGMT 30/<: CPT | Performed by: FAMILY MEDICINE

## 2020-01-01 PROCEDURE — 25010000002 HYDRALAZINE PER 20 MG: Performed by: FAMILY MEDICINE

## 2020-01-01 PROCEDURE — 93306 TTE W/DOPPLER COMPLETE: CPT | Performed by: INTERNAL MEDICINE

## 2020-01-01 PROCEDURE — 83735 ASSAY OF MAGNESIUM: CPT | Performed by: PHYSICIAN ASSISTANT

## 2020-01-01 PROCEDURE — 71046 X-RAY EXAM CHEST 2 VIEWS: CPT

## 2020-01-01 PROCEDURE — 93005 ELECTROCARDIOGRAM TRACING: CPT | Performed by: INTERNAL MEDICINE

## 2020-01-01 PROCEDURE — 0 IOPAMIDOL PER 1 ML: Performed by: INTERNAL MEDICINE

## 2020-01-01 PROCEDURE — C1769 GUIDE WIRE: HCPCS | Performed by: INTERNAL MEDICINE

## 2020-01-01 PROCEDURE — 80048 BASIC METABOLIC PNL TOTAL CA: CPT | Performed by: FAMILY MEDICINE

## 2020-01-01 PROCEDURE — 85025 COMPLETE CBC W/AUTO DIFF WBC: CPT | Performed by: PHYSICIAN ASSISTANT

## 2020-01-01 PROCEDURE — 71250 CT THORAX DX C-: CPT

## 2020-01-01 PROCEDURE — 99223 1ST HOSP IP/OBS HIGH 75: CPT | Performed by: FAMILY MEDICINE

## 2020-01-01 PROCEDURE — 74176 CT ABD & PELVIS W/O CONTRAST: CPT

## 2020-01-01 DEVICE — GEN PM TPS LEADLESS MICRA/AV VVIR RR 1.75G 0.8CC 18MM: Type: IMPLANTABLE DEVICE | Status: FUNCTIONAL

## 2020-01-01 RX ORDER — SODIUM CHLORIDE 9 MG/ML
INJECTION, SOLUTION INTRAVENOUS CONTINUOUS PRN
Status: COMPLETED | OUTPATIENT
Start: 2020-01-01 | End: 2020-01-01

## 2020-01-01 RX ORDER — CARVEDILOL 12.5 MG/1
TABLET ORAL
Qty: 180 TABLET | Refills: 3 | Status: SHIPPED | OUTPATIENT
Start: 2020-01-01 | End: 2020-01-01 | Stop reason: HOSPADM

## 2020-01-01 RX ORDER — SODIUM CHLORIDE 0.9 % (FLUSH) 0.9 %
10 SYRINGE (ML) INJECTION AS NEEDED
Status: DISCONTINUED | OUTPATIENT
Start: 2020-01-01 | End: 2020-01-01 | Stop reason: HOSPADM

## 2020-01-01 RX ORDER — CARVEDILOL 12.5 MG/1
12.5 TABLET ORAL 2 TIMES DAILY WITH MEALS
Start: 2020-01-01 | End: 2021-09-15

## 2020-01-01 RX ORDER — ESCITALOPRAM OXALATE 10 MG/1
10 TABLET ORAL DAILY
Qty: 90 TABLET | Refills: 3 | Status: SHIPPED | OUTPATIENT
Start: 2020-01-01 | End: 2021-09-15

## 2020-01-01 RX ORDER — LOSARTAN POTASSIUM 25 MG/1
25 TABLET ORAL
Status: DISCONTINUED | OUTPATIENT
Start: 2020-01-01 | End: 2020-01-01 | Stop reason: HOSPADM

## 2020-01-01 RX ORDER — ATROPINE SULFATE 0.4 MG/ML
0.4 AMPUL (ML) INJECTION ONCE
Status: DISCONTINUED | OUTPATIENT
Start: 2020-01-01 | End: 2020-01-01

## 2020-01-01 RX ORDER — CLONAZEPAM 1 MG/1
TABLET ORAL
Qty: 90 TABLET | Refills: 0 | Status: SHIPPED | OUTPATIENT
Start: 2020-01-01 | End: 2021-01-01 | Stop reason: HOSPADM

## 2020-01-01 RX ORDER — LEVOTHYROXINE SODIUM 0.05 MG/1
50 TABLET ORAL DAILY
Status: DISCONTINUED | OUTPATIENT
Start: 2020-01-01 | End: 2020-01-01 | Stop reason: HOSPADM

## 2020-01-01 RX ORDER — FUROSEMIDE 10 MG/ML
20 INJECTION INTRAMUSCULAR; INTRAVENOUS ONCE
Status: COMPLETED | OUTPATIENT
Start: 2020-01-01 | End: 2020-01-01

## 2020-01-01 RX ORDER — BISACODYL 10 MG
10 SUPPOSITORY, RECTAL RECTAL DAILY PRN
Status: ON HOLD
Start: 2020-01-01 | End: 2020-01-01

## 2020-01-01 RX ORDER — LEVOTHYROXINE SODIUM 0.05 MG/1
50 TABLET ORAL DAILY
Qty: 90 TABLET | Refills: 3 | Status: CANCELLED | OUTPATIENT
Start: 2020-01-01

## 2020-01-01 RX ORDER — BISACODYL 10 MG
10 SUPPOSITORY, RECTAL RECTAL DAILY PRN
Status: DISCONTINUED | OUTPATIENT
Start: 2020-01-01 | End: 2020-01-01 | Stop reason: HOSPADM

## 2020-01-01 RX ORDER — NITROGLYCERIN 0.4 MG/1
0.4 TABLET SUBLINGUAL
Status: DISCONTINUED | OUTPATIENT
Start: 2020-01-01 | End: 2020-01-01 | Stop reason: HOSPADM

## 2020-01-01 RX ORDER — ASPIRIN 81 MG/1
81 TABLET ORAL DAILY
Status: DISCONTINUED | OUTPATIENT
Start: 2020-01-01 | End: 2020-01-01 | Stop reason: HOSPADM

## 2020-01-01 RX ORDER — CLONAZEPAM 0.5 MG/1
0.25 TABLET ORAL NIGHTLY
Status: DISCONTINUED | OUTPATIENT
Start: 2020-01-01 | End: 2020-01-01 | Stop reason: HOSPADM

## 2020-01-01 RX ORDER — HYDRALAZINE HYDROCHLORIDE 20 MG/ML
10 INJECTION INTRAMUSCULAR; INTRAVENOUS EVERY 4 HOURS PRN
Status: DISCONTINUED | OUTPATIENT
Start: 2020-01-01 | End: 2020-01-01 | Stop reason: HOSPADM

## 2020-01-01 RX ORDER — FUROSEMIDE 20 MG/1
20 TABLET ORAL DAILY
Qty: 30 TABLET | Refills: 0 | Status: SHIPPED | OUTPATIENT
Start: 2020-01-01 | End: 2021-01-01

## 2020-01-01 RX ORDER — ESCITALOPRAM OXALATE 10 MG/1
10 TABLET ORAL DAILY
Status: DISCONTINUED | OUTPATIENT
Start: 2020-01-01 | End: 2020-01-01 | Stop reason: HOSPADM

## 2020-01-01 RX ORDER — ACETAMINOPHEN 650 MG/1
650 SUPPOSITORY RECTAL EVERY 4 HOURS PRN
Status: DISCONTINUED | OUTPATIENT
Start: 2020-01-01 | End: 2020-01-01 | Stop reason: HOSPADM

## 2020-01-01 RX ORDER — SODIUM CHLORIDE 0.9 % (FLUSH) 0.9 %
10 SYRINGE (ML) INJECTION EVERY 12 HOURS SCHEDULED
Status: DISCONTINUED | OUTPATIENT
Start: 2020-01-01 | End: 2020-01-01 | Stop reason: HOSPADM

## 2020-01-01 RX ORDER — CLONAZEPAM 0.5 MG/1
1 TABLET ORAL NIGHTLY
Status: DISCONTINUED | OUTPATIENT
Start: 2020-01-01 | End: 2020-01-01

## 2020-01-01 RX ORDER — BUPIVACAINE HYDROCHLORIDE 5 MG/ML
INJECTION, SOLUTION PERINEURAL AS NEEDED
Status: DISCONTINUED | OUTPATIENT
Start: 2020-01-01 | End: 2020-01-01 | Stop reason: HOSPADM

## 2020-01-01 RX ORDER — SODIUM CHLORIDE 0.9 % (FLUSH) 0.9 %
3 SYRINGE (ML) INJECTION EVERY 12 HOURS SCHEDULED
Status: DISCONTINUED | OUTPATIENT
Start: 2020-01-01 | End: 2020-01-01 | Stop reason: HOSPADM

## 2020-01-01 RX ORDER — OXYBUTYNIN CHLORIDE 5 MG/1
10 TABLET, EXTENDED RELEASE ORAL DAILY
Status: DISCONTINUED | OUTPATIENT
Start: 2020-01-01 | End: 2020-01-01 | Stop reason: HOSPADM

## 2020-01-01 RX ORDER — CARVEDILOL 12.5 MG/1
12.5 TABLET ORAL DAILY
COMMUNITY
End: 2020-01-01

## 2020-01-01 RX ORDER — SODIUM CHLORIDE 0.9 % (FLUSH) 0.9 %
1-10 SYRINGE (ML) INJECTION AS NEEDED
Status: DISCONTINUED | OUTPATIENT
Start: 2020-01-01 | End: 2020-01-01 | Stop reason: HOSPADM

## 2020-01-01 RX ORDER — ATORVASTATIN CALCIUM 20 MG/1
20 TABLET, FILM COATED ORAL NIGHTLY
Status: DISCONTINUED | OUTPATIENT
Start: 2020-01-01 | End: 2020-01-01

## 2020-01-01 RX ORDER — ACETAMINOPHEN 325 MG/1
650 TABLET ORAL EVERY 4 HOURS PRN
Status: DISCONTINUED | OUTPATIENT
Start: 2020-01-01 | End: 2020-01-01 | Stop reason: HOSPADM

## 2020-01-01 RX ORDER — ATORVASTATIN CALCIUM 20 MG/1
20 TABLET, FILM COATED ORAL DAILY
Status: DISCONTINUED | OUTPATIENT
Start: 2020-01-01 | End: 2020-01-01 | Stop reason: HOSPADM

## 2020-01-01 RX ORDER — LEVOTHYROXINE SODIUM 0.05 MG/1
50 TABLET ORAL DAILY
Qty: 90 TABLET | Refills: 3 | Status: SHIPPED | OUTPATIENT
Start: 2020-01-01 | End: 2021-09-15

## 2020-01-01 RX ORDER — KETOROLAC TROMETHAMINE 15 MG/ML
15 INJECTION, SOLUTION INTRAMUSCULAR; INTRAVENOUS EVERY 8 HOURS
Status: DISCONTINUED | OUTPATIENT
Start: 2020-01-01 | End: 2020-01-01 | Stop reason: HOSPADM

## 2020-01-01 RX ORDER — MIDAZOLAM HYDROCHLORIDE 1 MG/ML
INJECTION INTRAMUSCULAR; INTRAVENOUS AS NEEDED
Status: DISCONTINUED | OUTPATIENT
Start: 2020-01-01 | End: 2020-01-01 | Stop reason: HOSPADM

## 2020-01-01 RX ORDER — LEVOTHYROXINE SODIUM 0.05 MG/1
50 TABLET ORAL
Status: DISCONTINUED | OUTPATIENT
Start: 2020-01-01 | End: 2020-01-01 | Stop reason: HOSPADM

## 2020-01-01 RX ORDER — HYDRALAZINE HYDROCHLORIDE 20 MG/ML
10 INJECTION INTRAMUSCULAR; INTRAVENOUS EVERY 4 HOURS PRN
Status: ON HOLD
Start: 2020-01-01 | End: 2020-01-01

## 2020-01-01 RX ORDER — HEPARIN SODIUM 1000 [USP'U]/ML
INJECTION, SOLUTION INTRAVENOUS; SUBCUTANEOUS AS NEEDED
Status: DISCONTINUED | OUTPATIENT
Start: 2020-01-01 | End: 2020-01-01 | Stop reason: HOSPADM

## 2020-01-01 RX ORDER — ACETAMINOPHEN 325 MG/1
650 TABLET ORAL EVERY 4 HOURS PRN
Status: ON HOLD
Start: 2020-01-01 | End: 2020-01-01

## 2020-01-01 RX ORDER — CLINDAMYCIN PHOSPHATE 900 MG/50ML
900 INJECTION INTRAVENOUS
Status: COMPLETED | OUTPATIENT
Start: 2020-01-01 | End: 2020-01-01

## 2020-01-01 RX ORDER — ACETAMINOPHEN 325 MG/1
650 TABLET ORAL EVERY 4 HOURS PRN
Qty: 60 TABLET | Refills: 0 | Status: SHIPPED | OUTPATIENT
Start: 2020-01-01 | End: 2021-09-15

## 2020-01-01 RX ORDER — TOLTERODINE 4 MG/1
CAPSULE, EXTENDED RELEASE ORAL
Qty: 90 CAPSULE | Refills: 3 | Status: SHIPPED | OUTPATIENT
Start: 2020-01-01 | End: 2021-09-15

## 2020-01-01 RX ORDER — ATORVASTATIN CALCIUM 20 MG/1
20 TABLET, FILM COATED ORAL NIGHTLY
Status: DISCONTINUED | OUTPATIENT
Start: 2020-01-01 | End: 2020-01-01 | Stop reason: HOSPADM

## 2020-01-01 RX ORDER — LIDOCAINE HYDROCHLORIDE 10 MG/ML
INJECTION, SOLUTION INFILTRATION; PERINEURAL AS NEEDED
Status: DISCONTINUED | OUTPATIENT
Start: 2020-01-01 | End: 2020-01-01 | Stop reason: HOSPADM

## 2020-01-01 RX ADMIN — SODIUM CHLORIDE, PRESERVATIVE FREE 3 ML: 5 INJECTION INTRAVENOUS at 22:43

## 2020-01-01 RX ADMIN — CLONAZEPAM 0.25 MG: 0.5 TABLET ORAL at 22:56

## 2020-01-01 RX ADMIN — ATORVASTATIN CALCIUM 20 MG: 20 TABLET, FILM COATED ORAL at 08:42

## 2020-01-01 RX ADMIN — ESCITALOPRAM OXALATE 10 MG: 10 TABLET ORAL at 08:42

## 2020-01-01 RX ADMIN — ATROPINE SULFATE 0.5 MG: 0.1 INJECTION PARENTERAL at 17:59

## 2020-01-01 RX ADMIN — ATROPINE SULFATE 0.5 MG: 0.1 INJECTION PARENTERAL at 16:55

## 2020-01-01 RX ADMIN — CLINDAMYCIN PHOSPHATE 900 MG: 18 INJECTION, SOLUTION INTRAVENOUS at 17:21

## 2020-01-01 RX ADMIN — SODIUM CHLORIDE, PRESERVATIVE FREE 10 ML: 5 INJECTION INTRAVENOUS at 08:43

## 2020-01-01 RX ADMIN — HYDRALAZINE HYDROCHLORIDE 10 MG: 20 INJECTION INTRAMUSCULAR; INTRAVENOUS at 22:56

## 2020-01-01 RX ADMIN — LOSARTAN POTASSIUM 25 MG: 25 TABLET, FILM COATED ORAL at 08:24

## 2020-01-01 RX ADMIN — OXYBUTYNIN CHLORIDE 10 MG: 5 TABLET, EXTENDED RELEASE ORAL at 08:42

## 2020-01-01 RX ADMIN — KETOROLAC TROMETHAMINE 15 MG: 15 INJECTION, SOLUTION INTRAMUSCULAR; INTRAVENOUS at 22:42

## 2020-01-01 RX ADMIN — SODIUM CHLORIDE, PRESERVATIVE FREE 10 ML: 5 INJECTION INTRAVENOUS at 22:57

## 2020-01-01 RX ADMIN — FUROSEMIDE 20 MG: 10 INJECTION, SOLUTION INTRAMUSCULAR; INTRAVENOUS at 22:56

## 2020-01-01 RX ADMIN — ASPIRIN 81 MG: 81 TABLET, FILM COATED ORAL at 08:24

## 2020-01-01 RX ADMIN — LOSARTAN POTASSIUM 25 MG: 25 TABLET, FILM COATED ORAL at 08:42

## 2020-01-03 LAB
BASOPHILS # BLD AUTO: 0.06 10*3/MM3 (ref 0–0.2)
BASOPHILS NFR BLD AUTO: 1.1 % (ref 0–1.5)
EOSINOPHIL # BLD AUTO: 0.15 10*3/MM3 (ref 0–0.4)
EOSINOPHIL NFR BLD AUTO: 2.8 % (ref 0.3–6.2)
ERYTHROCYTE [DISTWIDTH] IN BLOOD BY AUTOMATED COUNT: 13.1 % (ref 12.3–15.4)
HCT VFR BLD AUTO: 39.8 % (ref 37.5–51)
HGB BLD-MCNC: 13.3 G/DL (ref 13–17.7)
IMM GRANULOCYTES # BLD AUTO: 0.01 10*3/MM3 (ref 0–0.05)
IMM GRANULOCYTES NFR BLD AUTO: 0.2 % (ref 0–0.5)
LYMPHOCYTES # BLD AUTO: 1.09 10*3/MM3 (ref 0.7–3.1)
LYMPHOCYTES NFR BLD AUTO: 20.3 % (ref 19.6–45.3)
MCH RBC QN AUTO: 30.9 PG (ref 26.6–33)
MCHC RBC AUTO-ENTMCNC: 33.4 G/DL (ref 31.5–35.7)
MCV RBC AUTO: 92.6 FL (ref 79–97)
MONOCYTES # BLD AUTO: 0.37 10*3/MM3 (ref 0.1–0.9)
MONOCYTES NFR BLD AUTO: 6.9 % (ref 5–12)
NEUTROPHILS # BLD AUTO: 3.69 10*3/MM3 (ref 1.7–7)
NEUTROPHILS NFR BLD AUTO: 68.7 % (ref 42.7–76)
NRBC BLD AUTO-RTO: 0 /100 WBC (ref 0–0.2)
PLATELET # BLD AUTO: 310 10*3/MM3 (ref 140–450)
RBC # BLD AUTO: 4.3 10*6/MM3 (ref 4.14–5.8)
T4 FREE SERPL-MCNC: 1.39 NG/DL (ref 0.93–1.7)
TSH SERPL DL<=0.005 MIU/L-ACNC: 2.95 UIU/ML (ref 0.27–4.2)
WBC # BLD AUTO: 5.37 10*3/MM3 (ref 3.4–10.8)

## 2020-01-13 RX ORDER — ATORVASTATIN CALCIUM 20 MG/1
TABLET, FILM COATED ORAL
Qty: 90 TABLET | Refills: 4 | Status: SHIPPED | OUTPATIENT
Start: 2020-01-13 | End: 2021-01-01

## 2020-01-14 ENCOUNTER — OFFICE VISIT (OUTPATIENT)
Dept: UROLOGY | Facility: CLINIC | Age: 85
End: 2020-01-14

## 2020-01-14 VITALS
TEMPERATURE: 98.5 F | OXYGEN SATURATION: 95 % | HEART RATE: 64 BPM | DIASTOLIC BLOOD PRESSURE: 70 MMHG | RESPIRATION RATE: 14 BRPM | SYSTOLIC BLOOD PRESSURE: 116 MMHG

## 2020-01-14 DIAGNOSIS — N40.1 BENIGN PROSTATIC HYPERPLASIA WITH LOWER URINARY TRACT SYMPTOMS, SYMPTOM DETAILS UNSPECIFIED: Primary | ICD-10-CM

## 2020-01-14 LAB
BILIRUB BLD-MCNC: NEGATIVE MG/DL
CLARITY, POC: CLEAR
COLOR UR: YELLOW
GLUCOSE UR STRIP-MCNC: NEGATIVE MG/DL
KETONES UR QL: NEGATIVE
LEUKOCYTE EST, POC: NEGATIVE
NITRITE UR-MCNC: NEGATIVE MG/ML
PH UR: 6 [PH] (ref 5–8)
PROT UR STRIP-MCNC: NEGATIVE MG/DL
RBC # UR STRIP: NEGATIVE /UL
SP GR UR: 1.01 (ref 1–1.03)
UROBILINOGEN UR QL: NORMAL

## 2020-01-14 PROCEDURE — 81003 URINALYSIS AUTO W/O SCOPE: CPT | Performed by: UROLOGY

## 2020-01-14 PROCEDURE — 99213 OFFICE O/P EST LOW 20 MIN: CPT | Performed by: UROLOGY

## 2020-01-14 RX ORDER — MIRTAZAPINE 30 MG/1
TABLET, FILM COATED ORAL
COMMUNITY
Start: 2019-12-11 | End: 2020-02-03

## 2020-01-14 NOTE — PROGRESS NOTES
Chief Complaint  Benign Prostatic Hypertrophy (yearly follow up.)        OFELIA Michele is a 89 y.o. male who returns today for an annual checkup doing surprisingly well.  He had a TURP done over 30 years ago and has had no trouble voiding since.  During the last few years he is began to have problems with incontinence and wears a diaper.  He states it is only changed once per day and it is currently dry.  He is on no urological medications.  His voided urine today is clear.  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)    • Sleep apnea    • Spermatocele    • Syncope, near    • URTI (acute upper respiratory infection)        Past Surgical History  Past Surgical History:   Procedure Laterality Date   • CHOLECYSTECTOMY  20 years ago   • COLONOSCOPY  15-20 years ago   • COLONOSCOPY W/ POLYPECTOMY      Dr Toledo   • CYSTOSCOPY TRANSURETHRAL RESECTION OF PROSTATE     • PROSTATECTOMY      non cancer, Dr Toledo   • TONSILLECTOMY  1931       Medications  has a current medication list which includes the following prescription(s): aspirin, atorvastatin, candesartan, carvedilol, carvedilol, clonazepam, levothyroxine, mirtazapine, multiple vitamins-minerals, omega-3 fatty acids, polyethylene glycol 3350, and tolterodine la.      Allergies  Allergies   Allergen Reactions   • Ace Inhibitors    • Amoxicillin-Pot Clavulanate    • Cefaclor    • Hydrochlorothiazide    • Nitrofurantoin        Social History  Social History     Socioeconomic History   • Marital status:      Spouse  name: Not on file   • Number of children: Not on file   • Years of education: Not on file   • Highest education level: Not on file   Tobacco Use   • Smoking status: Never Smoker   • Smokeless tobacco: Never Used   Substance and Sexual Activity   • Alcohol use: Yes     Alcohol/week: 3.0 standard drinks     Types: 3 Cans of beer per week   • Drug use: No   • Sexual activity: Defer       Family History  He has no family history of bladder or kidney cancer  He has no family history of kidney stones      AUA Symptom Score:      Review of Systems  Review of Systems   Constitutional: Negative for activity change, appetite change, chills, fatigue, fever, unexpected weight gain and unexpected weight loss.   Respiratory: Negative for apnea, cough, chest tightness, shortness of breath, wheezing and stridor.    Cardiovascular: Negative for chest pain, palpitations and leg swelling.   Gastrointestinal: Negative for abdominal distention, abdominal pain, anal bleeding, blood in stool, constipation, diarrhea, nausea, rectal pain, vomiting, GERD and indigestion.   Genitourinary: Negative for decreased libido, decreased urine volume, difficulty urinating, discharge, dysuria, flank pain, frequency, genital sores, hematuria, nocturia, penile pain, erectile dysfunction, penile swelling, scrotal swelling, testicular pain, urgency and urinary incontinence.   Musculoskeletal: Negative for back pain and joint swelling.   Neurological: Negative for tremors, seizures, speech difficulty, weakness and numbness.   Psychiatric/Behavioral: Negative for agitation, decreased concentration, sleep disturbance, depressed mood and stress. The patient is not nervous/anxious.        Physical Exam  Physical Exam   Constitutional: He is oriented to person, place, and time. He appears well-developed and well-nourished.   HENT:   Head: Normocephalic and atraumatic.   Neck: Normal range of motion.   Pulmonary/Chest: Effort normal. No respiratory distress.    Abdominal: Soft. He exhibits no distension and no mass. There is no tenderness. No hernia.   Genitourinary: Rectum normal and prostate normal.   Musculoskeletal: Normal range of motion.   Lymphadenopathy:     He has no cervical adenopathy.   Neurological: He is alert and oriented to person, place, and time.   Skin: Skin is warm and dry.   Psychiatric: He has a normal mood and affect. His behavior is normal.   Vitals reviewed.      Labs Recent and today in the office:  Results for orders placed or performed in visit on 01/14/20   POC Urinalysis Dipstick, Automated   Result Value Ref Range    Color Yellow Yellow, Straw, Dark Yellow, Izzy    Clarity, UA Clear Clear    Specific Gravity  1.015 1.005 - 1.030    pH, Urine 6.0 5.0 - 8.0    Leukocytes Negative Negative    Nitrite, UA Negative Negative    Protein, POC Negative Negative mg/dL    Glucose, UA Negative Negative, 1000 mg/dL (3+) mg/dL    Ketones, UA Negative Negative    Urobilinogen, UA Normal Normal    Bilirubin Negative Negative    Blood, UA Negative Negative         Assessment & Plan  BPH with outlet obstruction: He has no difficulty voiding 30 years after TURP and has minimal problem with incontinence.  This is probably more related to his age and poor mobility then a urological condition.  His voided urine today is clear and his digital rectal exam reveals postsurgical changes only with no sign of cancer.  He will return on an annual basis.

## 2020-02-03 ENCOUNTER — OFFICE VISIT (OUTPATIENT)
Dept: INTERNAL MEDICINE | Facility: CLINIC | Age: 85
End: 2020-02-03

## 2020-02-03 VITALS
BODY MASS INDEX: 17.48 KG/M2 | HEART RATE: 60 BPM | SYSTOLIC BLOOD PRESSURE: 116 MMHG | HEIGHT: 69 IN | WEIGHT: 118 LBS | TEMPERATURE: 98.5 F | DIASTOLIC BLOOD PRESSURE: 68 MMHG | OXYGEN SATURATION: 97 %

## 2020-02-03 DIAGNOSIS — E03.8 OTHER SPECIFIED HYPOTHYROIDISM: ICD-10-CM

## 2020-02-03 DIAGNOSIS — F51.01 PRIMARY INSOMNIA: ICD-10-CM

## 2020-02-03 DIAGNOSIS — I10 ESSENTIAL HYPERTENSION: ICD-10-CM

## 2020-02-03 DIAGNOSIS — F41.9 ANXIETY: Primary | ICD-10-CM

## 2020-02-03 DIAGNOSIS — E78.2 MIXED HYPERLIPIDEMIA: ICD-10-CM

## 2020-02-03 DIAGNOSIS — N39.3 MALE URINARY STRESS INCONTINENCE: ICD-10-CM

## 2020-02-03 DIAGNOSIS — K21.9 GASTROESOPHAGEAL REFLUX DISEASE WITHOUT ESOPHAGITIS: ICD-10-CM

## 2020-02-03 DIAGNOSIS — F41.9 ANXIETY: ICD-10-CM

## 2020-02-03 PROCEDURE — 99214 OFFICE O/P EST MOD 30 MIN: CPT | Performed by: INTERNAL MEDICINE

## 2020-02-03 RX ORDER — CLONAZEPAM 1 MG/1
1 TABLET ORAL NIGHTLY PRN
Qty: 90 TABLET | Refills: 0
Start: 2020-02-03 | End: 2020-02-03 | Stop reason: SDUPTHER

## 2020-02-03 RX ORDER — CLONAZEPAM 1 MG/1
1 TABLET ORAL NIGHTLY PRN
Qty: 90 TABLET | Refills: 0 | Status: SHIPPED | OUTPATIENT
Start: 2020-02-03 | End: 2020-05-12

## 2020-02-03 NOTE — PROGRESS NOTES
Chief Complaint   Patient presents with   • Follow-up     for GERD, HLD, HTN, and hypothyroidism. Would like to discuss Clonazepam, states 1/2 tablet isn't working.     Subjective   Checo Michele is a 89 y.o. male.     Here today for follow up of HTN, HLD, GERD, constipation, hypothyroid, DJD, urine incontinence and anxiety.   HTN/HLD- BP is well controlled today.  He denies any CP, SOA, edema or palpitations.  He is compliant with BP and cholesterol medication since his last hospital stay.  He was admitted to the hospital in November with symptoms of encephalopathy likely due to not taking his blood pressure medication.  GERD- he denies any GERD sxs at this time, no current meds for this  Constipation- he is taking miralax 1.5 caps a day and fruits daily and he is having good BMs.    DJD- he uses tylenol as needed for DJD  Hypothyroid- he takes his thyroid med daily in AM with just water before everything else  Urine incontinence- he wears depends daily for this.  He sees Dr Monique for this annually and remains on detrol.   Anxiety-he is off Remeron since his last hospital stay as it was felt that perhaps the combination of Remeron and Klonopin worsened his encephalopathy.  He has been on half the amount of Klonopin as he was previously on and is not sleeping well.  HCM-flu shot is up-to-date.  He has had first Hep A vaccine, needs the second.  He never got shingles vaccine       The following portions of the patient's history were reviewed and updated as appropriate: allergies, current medications, past family history, past medical history, past social history, past surgical history and problem list.    Review of Systems   Constitutional: Negative for activity change, appetite change and unexpected weight change.   HENT: Negative.    Eyes: Negative for visual disturbance.   Respiratory: Negative for shortness of breath.    Cardiovascular: Negative for chest pain, palpitations and leg swelling.  "  Gastrointestinal: Positive for constipation. Negative for abdominal pain.   Endocrine: Negative.    Genitourinary:        Chronic urine incontinence   Musculoskeletal: Positive for arthralgias.   Skin:        Several skin lesions over scalp-he scratches and these bleed on occasion   Neurological: Negative for headaches.   Hematological: Negative.    Psychiatric/Behavioral: Positive for confusion and sleep disturbance. Negative for dysphoric mood. The patient is nervous/anxious.        Objective   /68   Pulse 60   Temp 98.5 °F (36.9 °C)   Ht 175.3 cm (69\")   Wt 53.5 kg (118 lb)   SpO2 97%   BMI 17.43 kg/m²   Body mass index is 17.43 kg/m².  Physical Exam   Constitutional: He is oriented to person, place, and time. No distress.   Pleasant elderly man, NAD, thin   HENT:   Head: Normocephalic and atraumatic.   Right Ear: External ear normal.   Left Ear: External ear normal.   Mouth/Throat: Oropharynx is clear and moist.   Scattered yellow/tan plaques over scalp, some are scratched/scraped and scabbed due to excoriation  Poor dentition noted   Eyes: Pupils are equal, round, and reactive to light. Conjunctivae and EOM are normal.   Neck: Normal range of motion. Neck supple. No thyromegaly present.   Cardiovascular: Normal rate, regular rhythm and intact distal pulses.   Murmur heard.  No carotid bruits  SM 1/6 over precordium   Pulmonary/Chest: Effort normal and breath sounds normal. No respiratory distress. He has no wheezes.   Abdominal: Soft. Bowel sounds are normal. He exhibits no distension. There is no tenderness.   Musculoskeletal: Normal range of motion. He exhibits no edema.   Lymphadenopathy:     He has no cervical adenopathy.   Neurological: He is alert and oriented to person, place, and time. No cranial nerve deficit. Coordination normal.   Psychiatric: He has a normal mood and affect. His behavior is normal. Judgment and thought content normal.   Nursing note and vitals " reviewed.      Assessment/Plan   Checo Michele is here today and the following problems have been addressed:      Checo was seen today for follow-up.    Diagnoses and all orders for this visit:    Anxiety  -     clonazePAM (KlonoPIN) 1 MG tablet; Take 1 tablet by mouth At Night As Needed for Seizures.    Essential hypertension    Mixed hyperlipidemia    Gastroesophageal reflux disease without esophagitis    Other specified hypothyroidism    Male urinary stress incontinence    Primary insomnia        Follow heart healthy/low salt diet  Avoid processed foods  Monitor blood pressure on occasion  Exercise as tolerated -recommend chair exercises  Take all medications as prescribed  No current GERD sxs, constipation doing well with miralax and fruits/veg  Continue current dose of levothyroxine  Continue detrol, pull ups for incontinence  RF given on klonapin 1 mg qhs for sleep and anxiety, no signs of abuse    Return in about 4 months (around 6/3/2020) for Medicare Wellness.    Mary Report  The patient has read and signed the [facility] Controlled Substance Contract. The patient is aware of the potential for addiction and dependence. A Mary was reviewed and scanned into the chart.  Narcotic contract was signed by patient.   As part of this patient's treatment plan I am prescribing controlled substances. The patient has been made aware of appropriate use of such medications, including potential risk of somnolence, limited ability to drive and/or work safely, and potential for dependence or overdose. It has also been made clear that these medications are for use by this patient only, without concomitant use of alcohol or other substances unless prescribed.   Patient has completed prescribing agreement detailing terms of continued prescribing of controlled substances, including monitoring MARY reports, urine drug screening, and pill counts if necessary. The patient is aware that inappropriate use will  result in cessation of prescribing such medications.   History and physical exam exhibit continued safe and appropriate use of controlled substances      Marilyn K. Vermeesch, MD      Please note that portions of this note were completed with a voice recognition program.  Efforts were made to edit dictation, but occasionally words are mistranscribed.

## 2020-03-10 RX ORDER — MIRTAZAPINE 30 MG/1
TABLET, FILM COATED ORAL
Qty: 90 TABLET | Refills: 3 | OUTPATIENT
Start: 2020-03-10

## 2020-03-10 RX ORDER — CANDESARTAN 4 MG/1
TABLET ORAL
Qty: 90 TABLET | Refills: 3 | Status: SHIPPED | OUTPATIENT
Start: 2020-03-10 | End: 2021-01-01

## 2020-03-27 RX ORDER — CARVEDILOL 12.5 MG/1
12.5 TABLET ORAL 2 TIMES DAILY WITH MEALS
Qty: 180 TABLET | Refills: 1 | Status: SHIPPED | OUTPATIENT
Start: 2020-03-27 | End: 2020-01-01

## 2020-03-27 NOTE — TELEPHONE ENCOUNTER
PT'S DAUGHTER CALLED TO REQUEST A REFILL FOR carvedilol (COREG) 12.5 MG tablet.      JIMBO CONTACT [phone]   SHE REQUESTS A CALL BACK LETTING HER KNOW IT WAS SENT.      PHARMACY VERIFIED [facility]

## 2020-05-12 DIAGNOSIS — F41.9 ANXIETY: ICD-10-CM

## 2020-05-12 RX ORDER — CLONAZEPAM 1 MG/1
TABLET ORAL
Qty: 90 TABLET | Refills: 0 | Status: SHIPPED | OUTPATIENT
Start: 2020-05-12 | End: 2020-08-11

## 2020-05-13 DIAGNOSIS — E03.8 OTHER SPECIFIED HYPOTHYROIDISM: ICD-10-CM

## 2020-05-13 RX ORDER — LEVOTHYROXINE SODIUM 0.05 MG/1
TABLET ORAL
Qty: 90 TABLET | Refills: 3 | Status: SHIPPED | OUTPATIENT
Start: 2020-05-13 | End: 2020-01-01 | Stop reason: SDUPTHER

## 2020-05-28 ENCOUNTER — TELEPHONE (OUTPATIENT)
Dept: INTERNAL MEDICINE | Facility: CLINIC | Age: 85
End: 2020-05-28

## 2020-05-28 NOTE — TELEPHONE ENCOUNTER
PT CALLED TO REQUEST LAB ORDERS BEFORE HIS 6/4/2020 APPT.    PLEASE ADVISE.  CALL BACK:[phone]

## 2020-06-04 ENCOUNTER — OFFICE VISIT (OUTPATIENT)
Dept: INTERNAL MEDICINE | Facility: CLINIC | Age: 85
End: 2020-06-04

## 2020-06-04 VITALS
HEIGHT: 69 IN | WEIGHT: 123 LBS | TEMPERATURE: 97.5 F | DIASTOLIC BLOOD PRESSURE: 70 MMHG | OXYGEN SATURATION: 95 % | HEART RATE: 67 BPM | BODY MASS INDEX: 18.22 KG/M2 | SYSTOLIC BLOOD PRESSURE: 122 MMHG

## 2020-06-04 DIAGNOSIS — Z00.00 MEDICARE ANNUAL WELLNESS VISIT, SUBSEQUENT: Primary | ICD-10-CM

## 2020-06-04 DIAGNOSIS — E03.8 OTHER SPECIFIED HYPOTHYROIDISM: ICD-10-CM

## 2020-06-04 DIAGNOSIS — K21.9 GASTROESOPHAGEAL REFLUX DISEASE WITHOUT ESOPHAGITIS: ICD-10-CM

## 2020-06-04 DIAGNOSIS — I10 ESSENTIAL HYPERTENSION: ICD-10-CM

## 2020-06-04 DIAGNOSIS — E78.2 MIXED HYPERLIPIDEMIA: ICD-10-CM

## 2020-06-04 PROCEDURE — G0439 PPPS, SUBSEQ VISIT: HCPCS | Performed by: INTERNAL MEDICINE

## 2020-06-04 NOTE — PROGRESS NOTES
The ABCs of the Annual Wellness Visit  Subsequent Medicare Wellness Visit    Chief Complaint   Patient presents with   • Medicare Wellness-subsequent     daughter thinks Pt isn't drinking enough water, seems confused most of the time. States Pt constantly talks about BMs, the need to have a BM.        Subjective   History of Present Illness:  Checo Michele is a 89 y.o. male who presents for a Subsequent Medicare Wellness Visit.  PMH of HTN, HLD, GERD, constipation, hypothyroid, DJD, stress incontinence, laxative abuse, insomnia.  He denies any CP, SOA, edema or palpitations.  He is eating well and is still worried he has to have a BM with every meal.  Continues to try to take laxatives.  His daughter feels he does not drink enough water.  He is falling often and will not use his walker.  Continues to have difficulty with chronic urine incontinence.  He is very hard of hearing, uses his hearing aids as directed.    HEALTH RISK ASSESSMENT    Recent Hospitalizations:  Recently treated at the following:  [facility]    Current Medical Providers:  Patient Care Team:  Vermeesch, Marilyn K, MD as PCP - General    Smoking Status:  Social History     Tobacco Use   Smoking Status Never Smoker   Smokeless Tobacco Never Used       Alcohol Consumption:  Social History     Substance and Sexual Activity   Alcohol Use Yes   • Alcohol/week: 3.0 standard drinks   • Types: 3 Cans of beer per week       Depression Screen:   PHQ-2/PHQ-9 Depression Screening 6/4/2020   Little interest or pleasure in doing things 0   Feeling down, depressed, or hopeless 0   Total Score 0       Fall Risk Screen:  SHERRELL Fall Risk Assessment was completed, and patient is at HIGH risk for falls. Assessment completed on:6/4/2020    Health Habits and Functional and Cognitive Screening:  Functional & Cognitive Status 6/4/2020   Do you have difficulty preparing food and eating? No   Do you have difficulty bathing yourself, getting dressed  or grooming yourself? No   Do you have difficulty using the toilet? No   Do you have difficulty moving around from place to place? Yes   Do you have trouble with steps or getting out of a bed or a chair? Yes   Current Diet Well Balanced Diet   Dental Exam Not up to date   Eye Exam Not up to date   Exercise (times per week) 0 times per week   Current Exercise Activities Include None   Do you need help using the phone?  Yes   Are you deaf or do you have serious difficulty hearing?  Yes   Do you need help with transportation? Yes   Do you need help shopping? No   Do you need help preparing meals?  No   Do you need help with housework?  No   Do you need help with laundry? No   Do you need help taking your medications? No   Do you need help managing money? No   Do you ever drive or ride in a car without wearing a seat belt? No   Have you felt unusual stress, anger or loneliness in the last month? No   Who do you live with? Spouse   If you need help, do you have trouble finding someone available to you? No   Do you have difficulty concentrating, remembering or making decisions? Yes         Does the patient have evidence of cognitive impairment? Yes    Asprin use counseling:Taking ASA appropriately as indicated    Age-appropriate Screening Schedule:  Refer to the list below for future screening recommendations based on patient's age, sex and/or medical conditions. Orders for these recommended tests are listed in the plan section. The patient has been provided with a written plan.    Health Maintenance   Topic Date Due   • ZOSTER VACCINE (1 of 2) 07/27/1975   • LIPID PANEL  03/28/2020   • INFLUENZA VACCINE  08/01/2020   • TDAP/TD VACCINES  Discontinued          The following portions of the patient's history were reviewed and updated as appropriate: allergies, current medications, past family history, past medical history, past social history, past surgical history and problem list.    Outpatient Medications Prior to Visit    Medication Sig Dispense Refill   • aspirin 81 MG tablet Take  by mouth daily.     • atorvastatin (LIPITOR) 20 MG tablet TAKE 1 TABLET DAILY 90 tablet 4   • candesartan (ATACAND) 4 MG tablet TAKE 1 TABLET DAILY FOR BLOOD PRESSURE 90 tablet 3   • carvedilol (COREG) 12.5 MG tablet Take 1 tablet by mouth 2 (Two) Times a Day With Meals. 180 tablet 1   • clonazePAM (KlonoPIN) 1 MG tablet TAKE 1 TABLET BY MOUTH EVERY EVENING AS NEEDED FOR SEIZURES 90 tablet 0   • levothyroxine (SYNTHROID, LEVOTHROID) 50 MCG tablet TAKE 1 TABLET DAILY 90 tablet 3   • Multiple Vitamins-Minerals (CENTRUM ADULTS PO) Take  by mouth daily.     • Omega-3 Fatty Acids (OMEGA 3 PO) Take  by mouth.     • Polyethylene Glycol 3350 granules Take  by mouth 2 (two) times a day.     • tolterodine LA (DETROL LA) 4 MG 24 hr capsule Take 1 capsule by mouth Daily. 90 capsule 3     No facility-administered medications prior to visit.        Patient Active Problem List   Diagnosis   • Gastroesophageal reflux disease without esophagitis   • Hyperlipidemia   • Hypertension   • Hypothyroidism   • Male urinary stress incontinence   • Osteoarthritis of knee   • Temporary cerebral vascular dysfunction   • Constipation   • Frequent use of laxatives   • Medicare annual wellness visit, subsequent   • Hypertensive encephalopathy   • Primary insomnia       Advanced Care Planning:  ACP discussion was held with the patient during this visit. Patient has an advance directive in EMR which is still valid.     Review of Systems   Constitutional: Negative.    HENT: Positive for hearing loss.    Eyes: Negative.    Respiratory: Negative.    Cardiovascular: Negative.    Gastrointestinal: Positive for constipation.   Endocrine: Negative.    Genitourinary:        Urine incontinence, awaken 1-2 times a night to urinate   Musculoskeletal: Positive for arthralgias and gait problem.        Right knee pain   Skin: Negative.    Allergic/Immunologic: Negative.    Hematological:  "Bruises/bleeds easily.   Psychiatric/Behavioral: Positive for confusion.       Compared to one year ago, the patient feels his physical health is worse.  Compared to one year ago, the patient feels his mental health is worse.    Reviewed chart for potential of high risk medication in the elderly: yes  Reviewed chart for potential of harmful drug interactions in the elderly:yes    Objective         Vitals:    06/04/20 0837   BP: 122/70   Pulse: 67   Temp: 97.5 °F (36.4 °C)   SpO2: 95%   Weight: 55.8 kg (123 lb)   Height: 175.3 cm (69\")   PainSc: 0-No pain       Body mass index is 18.16 kg/m².  Discussed the patient's BMI with him. The BMI is below average; BMI management plan is completed.    Physical Exam   Constitutional: He is oriented to person, place, and time. He appears well-developed and well-nourished. No distress.   Very pleasant elderly man, appears his stated age, he is wearing hearing aids and using his mask, he appears in no distress today   HENT:   Head: Normocephalic and atraumatic.   Right Ear: External ear normal.   Left Ear: External ear normal.   Hearing aids in place, he remains hard of hearing even with aids in place, these were removed and he has a small amount of wax in canals.  Several SK and AK lesions noted over scalp   Eyes: Pupils are equal, round, and reactive to light. Conjunctivae and EOM are normal. Right eye exhibits no discharge. Left eye exhibits no discharge.   Neck: Normal range of motion. Neck supple. No tracheal deviation present. No thyromegaly present.   Cardiovascular: Normal rate, regular rhythm and intact distal pulses.   Murmur heard.  No carotid bruits  Systolic murmur grade 2/6 heard over precordium, occasional ectopic beat   Pulmonary/Chest: Effort normal and breath sounds normal. No respiratory distress. He has no wheezes.   Abdominal: Soft. Bowel sounds are normal. He exhibits no distension. There is no tenderness.   Scaphoid abdomen, no palpable masses "   Musculoskeletal: Normal range of motion. He exhibits no edema or tenderness.   Right knee with bony prominence medially over medial tibial plateau, there is significant crepitus noted   Lymphadenopathy:     He has no cervical adenopathy.   Neurological: He is alert and oriented to person, place, and time. No cranial nerve deficit.   Skin:   Multiple AK and SK lesions noted over forearms, face and scalp   Psychiatric: He has a normal mood and affect. His behavior is normal. Thought content normal.   Patient is in a very pleasant mood, no evidence of anxiety or depression   Nursing note and vitals reviewed.            Assessment/Plan   Medicare Risks and Personalized Health Plan  CMS Preventative Services Quick Reference  Cardiovascular risk  Hearing Problem  Inactivity/Sedentary  Urinary Incontinence    The above risks/problems have been discussed with the patient.  Pertinent information has been shared with the patient in the After Visit Summary.  Follow up plans and orders are seen below in the Assessment/Plan Section.    Diagnoses and all orders for this visit:    1. Medicare annual wellness visit, subsequent (Primary)  -     Comprehensive Metabolic Panel  -     Lipid Panel With / Chol / HDL Ratio    2. Essential hypertension  -     Comprehensive Metabolic Panel  -     Lipid Panel With / Chol / HDL Ratio    3. Mixed hyperlipidemia  -     Comprehensive Metabolic Panel  -     Lipid Panel With / Chol / HDL Ratio    4. Gastroesophageal reflux disease without esophagitis    5. Other specified hypothyroidism    Follow heart healthy/low salt diet  Avoid processed foods  Monitor blood pressure as discussed  Exercise as tolerated up to 30 minutes 5 days per week  Take all medications as prescribed  Labs as noted  Continue atorvastatin for hyperlipidemia  Continue current dose of levothyroxine, most recent labs were in normal range  Continue Detrol for chronic urine incontinence  Encouraged patient to use MiraLAX 1-1/2 In  8 ounces of liquid every day for chronic constipation  Also discussed need to drink plenty of water throughout the day to help with his chronic constipation symptoms  Encouraged patient to use his walker at all times  Daughter was present with patient today at visit.  Daughter was given information on caregiver burnout of patients with dementia.  She was exhibiting signs of stress due to care of her father today  Follow Up:  Return in about 4 months (around 10/4/2020) for Next scheduled follow up.     An After Visit Summary and PPPS were given to the patient.

## 2020-06-05 LAB
ALBUMIN SERPL-MCNC: 4 G/DL (ref 3.5–5.2)
ALBUMIN/GLOB SERPL: 1.5 G/DL
ALP SERPL-CCNC: 80 U/L (ref 39–117)
ALT SERPL-CCNC: 11 U/L (ref 1–41)
AST SERPL-CCNC: 14 U/L (ref 1–40)
BILIRUB SERPL-MCNC: 1.1 MG/DL (ref 0.2–1.2)
BUN SERPL-MCNC: 6 MG/DL (ref 8–23)
BUN/CREAT SERPL: 7.1 (ref 7–25)
CALCIUM SERPL-MCNC: 9.1 MG/DL (ref 8.2–9.6)
CHLORIDE SERPL-SCNC: 99 MMOL/L (ref 98–107)
CHOLEST SERPL-MCNC: 162 MG/DL (ref 0–200)
CHOLEST/HDLC SERPL: 2.75 {RATIO}
CO2 SERPL-SCNC: 29.5 MMOL/L (ref 22–29)
CREAT SERPL-MCNC: 0.84 MG/DL (ref 0.76–1.27)
GLOBULIN SER CALC-MCNC: 2.6 GM/DL
GLUCOSE SERPL-MCNC: 101 MG/DL (ref 65–99)
HDLC SERPL-MCNC: 59 MG/DL (ref 40–60)
LDLC SERPL CALC-MCNC: 90 MG/DL (ref 0–100)
POTASSIUM SERPL-SCNC: 5.1 MMOL/L (ref 3.5–5.2)
PROT SERPL-MCNC: 6.6 G/DL (ref 6–8.5)
SODIUM SERPL-SCNC: 134 MMOL/L (ref 136–145)
TRIGL SERPL-MCNC: 66 MG/DL (ref 0–150)
VLDLC SERPL CALC-MCNC: 13.2 MG/DL

## 2020-06-05 NOTE — PROGRESS NOTES
Please call daughter with lab results.  Sodium level is slightly low due to use of Atacand for his blood pressure, otherwise all labs are in normal or acceptable range.  Sodium level is not in a worrisome range.

## 2020-06-30 ENCOUNTER — TELEPHONE (OUTPATIENT)
Dept: INTERNAL MEDICINE | Facility: CLINIC | Age: 85
End: 2020-06-30

## 2020-06-30 NOTE — TELEPHONE ENCOUNTER
His obsession over bowel movements is not a new thing, this is chronic for years.  But if he is obsessing over other things and acting aggressive this is different.  If he is becoming angry or mean or aggressive towards them I am concerned that he may have an underlying infection or he may be getting dementia.  We may need to do a work-up with labs including MRI of brain also.  I would like them to make an appointment for evaluation.

## 2020-06-30 NOTE — TELEPHONE ENCOUNTER
Please advise  I believe daughter mentioned at Pt's last appointment that he was obsessing over having a bowel movement.

## 2020-06-30 NOTE — TELEPHONE ENCOUNTER
Patients wife is calling stating that the patient is obsessing over certain things, acting very aggressive, not believing anything you tell him. Patients behavior has drastically changed and not sure if its medication related or if there is something else going on. Mini stated she just doesn't no what to do with him. Please advise and call back.    Mini is on [facility]  [phone]

## 2020-07-02 ENCOUNTER — OFFICE VISIT (OUTPATIENT)
Dept: INTERNAL MEDICINE | Facility: CLINIC | Age: 85
End: 2020-07-02

## 2020-07-02 VITALS
DIASTOLIC BLOOD PRESSURE: 62 MMHG | SYSTOLIC BLOOD PRESSURE: 120 MMHG | OXYGEN SATURATION: 94 % | WEIGHT: 122 LBS | HEIGHT: 69 IN | HEART RATE: 76 BPM | TEMPERATURE: 97.1 F | BODY MASS INDEX: 18.07 KG/M2

## 2020-07-02 DIAGNOSIS — F33.1 MODERATE EPISODE OF RECURRENT MAJOR DEPRESSIVE DISORDER (HCC): Primary | ICD-10-CM

## 2020-07-02 PROBLEM — F33.9 EPISODE OF RECURRENT MAJOR DEPRESSIVE DISORDER (HCC): Status: ACTIVE | Noted: 2020-07-02

## 2020-07-02 PROCEDURE — 99213 OFFICE O/P EST LOW 20 MIN: CPT | Performed by: INTERNAL MEDICINE

## 2020-07-02 RX ORDER — ESCITALOPRAM OXALATE 10 MG/1
10 TABLET ORAL DAILY
Qty: 30 TABLET | Refills: 1 | Status: SHIPPED | OUTPATIENT
Start: 2020-07-02 | End: 2020-08-14

## 2020-07-02 NOTE — PROGRESS NOTES
"Chief Complaint   Patient presents with   • Abstract     daughter states Pt has been obsessing over certain things, acting very aggressive, not believing anything you tell him.      Subjective   Checo Michele is a 89 y.o. male.     Pt is here today per family request due to obsessive thoughts.   He has been getting more confused lately per daughter.   He recently called the tax man and was worried that taxes were due and tax man had to verify to him that they were not yet due .  Daughter states that he often does not believe what she and his wife say and is confrontational about his obsessions.   He is taking his medications.    Daughter states he does not understand why he cannot get out since COVID hit.  Daughter states his behavior changes from day to day over past few months.   In November he was seen in ER for syncope and his remeron was stopped.         The following portions of the patient's history were reviewed and updated as appropriate: allergies, current medications, past family history, past medical history, past social history, past surgical history and problem list.    Review of Systems   Constitutional: Negative for activity change and appetite change.   Psychiatric/Behavioral: Positive for agitation, behavioral problems, confusion and dysphoric mood. Negative for sleep disturbance. The patient is not nervous/anxious.        Objective   /62   Pulse 76   Temp 97.1 °F (36.2 °C)   Ht 175.3 cm (69\")   Wt 55.3 kg (122 lb)   SpO2 94%   BMI 18.02 kg/m²   Body mass index is 18.02 kg/m².  Physical Exam   Constitutional: He is oriented to person, place, and time. He appears well-developed and well-nourished.   Pleasant elderly man, wearing a mask, ambulates with a cane   HENT:   Head: Normocephalic and atraumatic.   Right Ear: External ear normal.   Left Ear: External ear normal.   Hard of hearing   Eyes: EOM are normal.   Neck: Normal range of motion. No tracheal deviation present. "   Pulmonary/Chest: Effort normal. No respiratory distress.   Neurological: He is alert and oriented to person, place, and time. No cranial nerve deficit.   Skin:   Scab on left ant. scalp   Psychiatric: His behavior is normal. Judgment and thought content normal.   Flat affect, allows daughter to answer most questions due to hearing loss   Nursing note and vitals reviewed.      Assessment/Plan   Checo Michele is here today and the following problems have been addressed:      Checo was seen today for abstract.    Diagnoses and all orders for this visit:    Moderate episode of recurrent major depressive disorder (CMS/HCC)    Other orders  -     escitalopram (LEXAPRO) 10 MG tablet; Take 1 tablet by mouth Daily.    start lexapro 5 mg q day for one week, then 10 mg daily  Continue klonapin at night  Recommend derm follow up for skin lesion on scalp  If he is no better with lexapro, may resume remeron on next visit    RTC 6 weeks, check CMP then also, follow up depression    Please note that portions of this note were completed with a voice recognition program.  Efforts were made to edit dictation, but occasionally words are mistranscribed.

## 2020-08-11 DIAGNOSIS — F41.9 ANXIETY: ICD-10-CM

## 2020-08-11 RX ORDER — CLONAZEPAM 1 MG/1
TABLET ORAL
Qty: 90 TABLET | Refills: 0 | Status: SHIPPED | OUTPATIENT
Start: 2020-08-11 | End: 2020-01-01

## 2020-08-14 ENCOUNTER — OFFICE VISIT (OUTPATIENT)
Dept: INTERNAL MEDICINE | Facility: CLINIC | Age: 85
End: 2020-08-14

## 2020-08-14 VITALS
WEIGHT: 122 LBS | TEMPERATURE: 97.3 F | HEIGHT: 69 IN | BODY MASS INDEX: 18.07 KG/M2 | OXYGEN SATURATION: 94 % | DIASTOLIC BLOOD PRESSURE: 68 MMHG | SYSTOLIC BLOOD PRESSURE: 124 MMHG | HEART RATE: 109 BPM

## 2020-08-14 DIAGNOSIS — I10 ESSENTIAL HYPERTENSION: ICD-10-CM

## 2020-08-14 DIAGNOSIS — F33.1 MODERATE EPISODE OF RECURRENT MAJOR DEPRESSIVE DISORDER (HCC): Primary | ICD-10-CM

## 2020-08-14 PROCEDURE — 99213 OFFICE O/P EST LOW 20 MIN: CPT | Performed by: INTERNAL MEDICINE

## 2020-08-14 RX ORDER — ESCITALOPRAM OXALATE 10 MG/1
10 TABLET ORAL DAILY
Qty: 90 TABLET | Refills: 1 | Status: SHIPPED | OUTPATIENT
Start: 2020-08-14 | End: 2020-01-01 | Stop reason: SDUPTHER

## 2020-08-14 NOTE — PROGRESS NOTES
Chief Complaint   Patient presents with   • Follow-up     6 weeks for depression and repeat CMP.     Subjective   Checo Michele is a 89 y.o. male.     Patient is here today for follow-up of depression and OCD type symptoms.  Patient was seen in the emergency room in June and his Remeron was discontinued at that time.  He has a chronic obsession with bowel movements and this has worsened over the past several years.  He had been obsessing about other things though per his family.  6 weeks ago his family brought him to clinic to discuss his OCD tendencies.  At that time we started him on Lexapro for his underlying depression and OCD issues.  His daughter states he is more awake during the day and enjoying meals.  He is better about taking his medications during day as he is supposed to.  He is not obsessing as much as he was.  He is sleeping well at night.  His daughter and wife feel he is doing well on lexapro at this dose and do not want to make any changes in meds.  They do not feel any need to change medication back to Remeron at this time.  Patient has been on Klonopin for a long time at 1 mg nightly.  I have discussed decreasing this medication with him in the past and he is very reluctant.  Daughter states that he knows color and size of medication tablet and she is reluctant to decrease dose as patient often will argue about this.  He did see the dermatologist for his scalp lesions.  One scalp lesion was removed and several other were injected with medication or cryo froze.       The following portions of the patient's history were reviewed and updated as appropriate: allergies, current medications, past family history, past medical history, past social history, past surgical history and problem list.    Review of Systems   Constitutional: Negative for activity change, appetite change and unexpected weight change.   Gastrointestinal:        Patient obsesses over bowel movements, he does not have any  "constipation or diarrhea though   Skin:        Multiple skin lesions over scalp   Psychiatric/Behavioral: Positive for confusion and sleep disturbance. Negative for dysphoric mood. The patient is nervous/anxious.        Objective   /68   Pulse 109   Temp 97.3 °F (36.3 °C)   Ht 175.3 cm (69\")   Wt 55.3 kg (122 lb)   SpO2 94%   BMI 18.02 kg/m²   Body mass index is 18.02 kg/m².  Physical Exam   Constitutional: No distress.   Pleasant elderly man, appears his age, in no distress today   HENT:   Head: Normocephalic and atraumatic.   Right Ear: External ear normal.   Left Ear: External ear normal.   Multiple thickened yellow plaques noted over scalp, a few areas of scabs also noted  Hearing aids in place, very hard of hearing   Eyes: Pupils are equal, round, and reactive to light. EOM are normal.   Pulmonary/Chest: Effort normal. No respiratory distress.   Neurological: He is alert. No cranial nerve deficit. Coordination abnormal.   Patient ambulates with use of a cane, upon standing he nearly fell back into chair today due to loss of balance   Skin:   See H ROMAIN   Psychiatric: His behavior is normal.   Very hard of hearing.  Most of history obtained from daughter.  Patient does answer questions appropriately, he does not appear anxious or depressed today, mood appears bright compared to last office visit   Nursing note and vitals reviewed.      Assessment/Plan   Checo Michele is here today and the following problems have been addressed:      Checo was seen today for follow-up.    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    continue lexapro 10 mg q day-mood appears improved with brighter affect today  He remains on klonoapin 1mg qhs, I discussed lowering dose with daughter and she states he " knows color and size of pill and will complain if dose is dropped, but on next RF I am going to decrease to half usual dose as lexapro is helping him with anxiety and depression  CMP today to follow up sodium level    RTC 3 mo    Please note that portions of this note were completed with a voice recognition program.  Efforts were made to edit dictation, but occasionally words are mistranscribed.

## 2020-08-15 LAB
BUN SERPL-MCNC: 9 MG/DL (ref 8–23)
BUN/CREAT SERPL: 10.2 (ref 7–25)
CALCIUM SERPL-MCNC: 8.5 MG/DL (ref 8.2–9.6)
CHLORIDE SERPL-SCNC: 101 MMOL/L (ref 98–107)
CO2 SERPL-SCNC: 26.7 MMOL/L (ref 22–29)
CREAT SERPL-MCNC: 0.88 MG/DL (ref 0.76–1.27)
GLUCOSE SERPL-MCNC: 101 MG/DL (ref 65–99)
POTASSIUM SERPL-SCNC: 4.7 MMOL/L (ref 3.5–5.2)
SODIUM SERPL-SCNC: 136 MMOL/L (ref 136–145)

## 2020-11-06 NOTE — PROGRESS NOTES
Chief Complaint   Patient presents with   • Follow-up     3 months for GERD, HLD, HTN, and hypothyroidism.      Subjective   Checo Michele is a 89 y.o. male.     Here today for follow up of HTN, HLD, GERD, constipation, hypothyroid, DJD, urine incontinence and anxiety.   HTN/HLD- BP is well controlled today.  He denies any CP, SOA, edema or palpitations.  He is compliant with lipitor.  He has been eating at home, no fast or fatty food.  GERD- he denies any GERD sxs at this time, he has no meds  Constipation- he is having good results with miralax 1.5 capfuls a day  DJD- he continues to have pain in right knee.  He is supposed to use cane but forgets.  No recent falls.  He is using Tylenol as needed for his knee pain.  Urine incontinence- he wears depends daily for this.  He sees Dr Monique for this annually and remains on detrol for this.  He awakens once at night to urinate.   Anxiety- he remains on klonapin and Lexapro, this is quite helpful for this.  His daughter feels his anxiety is well controlled.  He denies any problem with depression or anxiety at this time.  His daughter says he naps often during the day.    HCM- he already had seasonal flu shot at pharmacy.        The following portions of the patient's history were reviewed and updated as appropriate: allergies, current medications, past family history, past medical history, past social history, past surgical history and problem list.    Review of Systems   Constitutional: Negative for activity change, appetite change and unexpected weight change.   HENT: Positive for hearing loss.    Eyes: Negative for visual disturbance.   Respiratory: Negative for shortness of breath.    Cardiovascular: Negative for chest pain, palpitations and leg swelling.   Gastrointestinal: Negative for abdominal pain and constipation.   Genitourinary:        Chronic urine incontinence   Musculoskeletal: Positive for arthralgias and gait problem.   Neurological: Negative  "for headaches.   Psychiatric/Behavioral: Negative for dysphoric mood and sleep disturbance. The patient is not nervous/anxious.        Objective   /62   Pulse 71   Temp 97 °F (36.1 °C)   Ht 175.3 cm (69\")   Wt 55.8 kg (123 lb)   SpO2 96%   BMI 18.16 kg/m²   Body mass index is 18.16 kg/m².  Physical Exam  Vitals signs and nursing note reviewed.   Constitutional:       General: He is not in acute distress.     Appearance: Normal appearance. He is well-developed. He is not ill-appearing.      Comments: Very kind and pleasant elderly man, appears his age, very hard of hearing even with hearing aids in place, in no distress today   HENT:      Head: Normocephalic and atraumatic.      Right Ear: External ear normal.      Left Ear: External ear normal.      Ears:      Comments: Hearing aids in place  Eyes:      General:         Right eye: No discharge.         Left eye: No discharge.      Extraocular Movements: Extraocular movements intact.      Conjunctiva/sclera: Conjunctivae normal.      Pupils: Pupils are equal, round, and reactive to light.   Neck:      Musculoskeletal: Normal range of motion and neck supple.      Thyroid: No thyromegaly.   Cardiovascular:      Rate and Rhythm: Normal rate and regular rhythm.      Pulses: Normal pulses.      Heart sounds: Murmur present.      Comments: No carotid bruits  Systolic murmur grade 2/6 heard over precordium  Pulmonary:      Effort: Pulmonary effort is normal. No respiratory distress.      Breath sounds: Normal breath sounds. No wheezing.   Abdominal:      General: Bowel sounds are normal. There is no distension.      Palpations: Abdomen is soft. There is no mass.      Tenderness: There is no abdominal tenderness.   Musculoskeletal: Normal range of motion.         General: Tenderness present.      Right lower leg: No edema.      Left lower leg: No edema.      Comments: Right knee with bony inflammation due to DJD over medial aspect of knee, significant crepitus " noted   Lymphadenopathy:      Cervical: No cervical adenopathy.   Neurological:      General: No focal deficit present.      Mental Status: He is alert and oriented to person, place, and time. Mental status is at baseline.      Cranial Nerves: No cranial nerve deficit.      Motor: Weakness present.      Coordination: Coordination normal.      Gait: Gait abnormal.      Comments: Lower extremity weakness, some difficulty getting up and down from chair to exam table due to hip flexor weakness   Psychiatric:         Mood and Affect: Mood normal.         Behavior: Behavior normal.         Thought Content: Thought content normal.         Judgment: Judgment normal.         Assessment/Plan   Checo Michele is here today and the following problems have been addressed:      Diagnoses and all orders for this visit:    1. Essential hypertension (Primary)    2. Anxiety    3. Mixed hyperlipidemia    4. Other specified hypothyroidism    5. Constipation, unspecified constipation type    6. Primary osteoarthritis of right knee    7. Moderate episode of recurrent major depressive disorder (CMS/HCC)    8. Primary insomnia    Follow heart healthy/low salt diet  Monitor blood pressure on occasion  Exercise as tolerated with chair exercises  Take all medications as prescribed  Constipation is doing well with use of MiraLAX daily  Continue Tylenol for arthritis of knee, but also recommend over-the-counter Voltaren gel 2-3 times daily if needed  Continue Lexapro for anxiety and depression and Klonopin at night to help with sleep, currently well controlled  All labs are currently up-to-date  Patient has had seasonal flu vaccine already      Return in about 4 months (around 3/6/2021) for Next scheduled follow up.      Marilyn K. Vermeesch, MD      Please note that portions of this note were completed with a voice recognition program.  Efforts were made to edit dictation, but occasionally words are mistranscribed.

## 2020-11-16 NOTE — TELEPHONE ENCOUNTER
Caller: Jimbo Michele    Relationship: Emergency Contact    Best call back number: [phone]     Medication needed:   Requested Prescriptions     Pending Prescriptions Disp Refills   • escitalopram (LEXAPRO) 10 MG tablet 90 tablet 1     Sig: Take 1 tablet by mouth Daily.       When do you need the refill by: SOON    What details did the patient provide when requesting the medication: JIMBO STATED THAT THE PT HAS TWO WEEKS LEFT. SHE ASKED FOR THIS TO BE PUT ON AUTO REFILL. SHE WOULD LIKE A CALL WHEN THIS HAS BEEN DONE.    Does the patient have less than a 3 day supply:  [] Yes  [x] No    What is the patient's preferred pharmacy: [facility] - [facility], [place] [address] [phone] [facility] [phone]

## 2020-11-23 PROBLEM — R00.1 BRADYCARDIA: Status: ACTIVE | Noted: 2020-01-01

## 2020-11-24 PROBLEM — I44.2 COMPLETE HEART BLOCK (HCC): Status: ACTIVE | Noted: 2020-01-01

## 2020-11-26 NOTE — OUTREACH NOTE
Prep Survey      Responses   [facility] [place] patient discharged from?  [place]   Is LACE score < 7 ?  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  Other   Does the patient have Home health ordered?  No   Is there a DME ordered?  No   Prep survey completed?  Yes          Joanne Lewis RN

## 2020-11-26 NOTE — OUTREACH NOTE
Prep Survey      Responses   [facility] [place] patient discharged from?  [place]   Is LACE score < 7 ?  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  Other   Does the patient have Home health ordered?  No   Is there a DME ordered?  No   Prep survey completed?  Yes          Joanne Lewis RN

## 2020-11-27 NOTE — OUTREACH NOTE
Call Center TCM Note      Responses   [facility] patient discharged from?  [place]   Does the patient have one of the following disease processes/diagnoses(primary or secondary)?  Other   TCM attempt successful?  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  Yes   List who call center can speak with  Lacey-daughter    Person spoke with today (if not patient) and relationship  Patient and Lacey    Meds reviewed with patient/caregiver?  Yes   Is the patient having any side effects they believe may be caused by any medication additions or changes?  No   Does the patient have all medications ordered at discharge?  Yes   Prescription comments  Daughter has a medication question. Routed that question to PCP office.    Is the patient taking all medications as directed (includes completed medication regime)?  Yes   Does the patient have a primary care provider?   Yes   Does the patient have an appointment with their PCP within 7 days of discharge?  Yes   Comments regarding PCP  Hospital d/c f/u appt is on 11/30/20 at 2:45 pm   Has the patient kept scheduled appointments due by today?  N/A   Psychosocial issues?  No   Psychosocial comments  Pt lives with his daughter    Did the patient receive a copy of their discharge instructions?  Yes   Nursing interventions  Reviewed instructions with patient   What is the patient's perception of their health status since discharge?  Improving   Is the patient/caregiver able to teach back signs and symptoms related to disease process for when to call PCP?  Yes   Is the patient/caregiver able to teach back signs and symptoms related to disease process for when to call 911?  Yes   Is the patient/caregiver able to teach back the hierarchy of who to call/visit for symptoms/problems? PCP,  Specialist, Home health nurse, Urgent Care, ED, 911  Yes [Pt wears a Life Alert]   If the patient is a current smoker, are they able to teach back resources for cessation?  Not a smoker   TCM call completed?  Yes          Tasha Herrera RN    11/27/2020, 15:11 EST

## 2020-11-29 NOTE — TELEPHONE ENCOUNTER
Daughter calling excited , dad has had two loose/diarrhea stools today , triage done,     Reason for Disposition  • [1] MILD diarrhea (e.g., 1-3 or more stools than normal in past 24 hours) without known cause AND [2] present >  7 days    Additional Information  • Negative: Shock suspected (e.g., cold/pale/clammy skin, too weak to stand, low BP, rapid pulse)  • Negative: Difficult to awaken or acting confused (e.g., disoriented, slurred speech)  • Negative: Sounds like a life-threatening emergency to the triager  • Negative: Vomiting also present and worse than the diarrhea  • Negative: [1] Blood in stool AND [2] without diarrhea  • Negative: Diarrhea in a cancer patient who is currently (or recently) receiving chemotherapy or radiation therapy, or cancer patient who has metastatic or end-stage cancer and is receiving palliative care  • Negative: [1] SEVERE abdominal pain (e.g., excruciating) AND [2] present > 1 hour  • Negative: [1] SEVERE abdominal pain AND [2] age > 60  • Negative: [1] Blood in the stool AND [2] moderate or large amount of blood  • Negative: Black or tarry bowel movements  (Exception: chronic-unchanged  black-grey bowel movements AND is taking iron pills or Pepto-bismol)  • Negative: [1] Drinking very little AND [2] dehydration suspected (e.g., no urine > 12 hours, very dry mouth, very lightheaded)  • Negative: Patient sounds very sick or weak to the triager  • Negative: [1] SEVERE diarrhea (e.g., 7 or more times / day more than normal) AND [2] age > 60 years  • Negative: [1] Constant abdominal pain AND [2] present > 2 hours  • Negative: [1] Fever > 103 F (39.4 C) AND [2] not able to get the fever down using Fever Care Advice  • Negative: [1] SEVERE diarrhea (e.g., 7 or more times / day more than normal) AND [2] present > 24 hours (1 day)  • Negative: [1] MODERATE diarrhea (e.g., 4-6 times / day more than normal) AND [2] present > 48 hours (2 days)  • Negative: [1] MODERATE diarrhea (e.g., 4-6  "times / day more than normal) AND [2] age > 70 years  • Negative: Fever > 101 F (38.3 C)  • Negative: Fever present > 3 days (72 hours)  • Negative: Abdominal pain  (Exception: Pain clears with each passage of diarrhea stool)  • Negative: [1] Blood in the stool AND [2] small amount of blood   (Exception: only on toilet paper. Reason: diarrhea can cause rectal irritation with blood on wiping)  • Negative: [1] Mucus or pus in stool AND [2] present > 2 days AND [3] diarrhea is more than mild  • Negative: [1] Recent antibiotic therapy (i.e., within last 2 months) AND [2] diarrhea present > 3 days since antibiotic was stopped  • Negative: [1] Recent hospitalization AND [2] diarrhea present > 3 days  • Negative: Weak immune system (e.g., HIV positive, cancer chemo, splenectomy, organ transplant, chronic steroids)  • Negative: Tube feedings (e.g., nasogastric, g-tube, j-tube)  • Negative: Travel to a foreign country in past month    Answer Assessment - Initial Assessment Questions  1. DIARRHEA SEVERITY: \"How bad is the diarrhea?\" \"How many extra stools have you had in the past 24 hours than normal?\"     - NO DIARRHEA (SCALE 0)    - MILD (SCALE 1-3): Few loose or mushy BMs; increase of 1-3 stools over normal daily number of stools; mild increase in ostomy output.    -  MODERATE (SCALE 4-7): Increase of 4-6 stools daily over normal; moderate increase in ostomy output.  * SEVERE (SCALE 8-10; OR 'WORST POSSIBLE'): Increase of 7 or more stools daily over normal; moderate increase in ostomy output; incontinence.      2 loose stools today  2. ONSET: \"When did the diarrhea begin?\"       today  3. BM CONSISTENCY: \"How loose or watery is the diarrhea?\"       Loose stool  4. VOMITING: \"Are you also vomiting?\" If so, ask: \"How many times in the past 24 hours?\"       2  5. ABDOMINAL PAIN: \"Are you having any abdominal pain?\" If yes: \"What does it feel like?\" (e.g., crampy, dull, intermittent, constant)       no  6. ABDOMINAL PAIN " "SEVERITY: If present, ask: \"How bad is the pain?\"  (e.g., Scale 1-10; mild, moderate, or severe)    - MILD (1-3): doesn't interfere with normal activities, abdomen soft and not tender to touch     - MODERATE (4-7): interferes with normal activities or awakens from sleep, tender to touch     - SEVERE (8-10): excruciating pain, doubled over, unable to do any normal activities        no  7. ORAL INTAKE: If vomiting, \"Have you been able to drink liquids?\" \"How much fluids have you had in the past 24 hours?\"      Eating well  8. HYDRATION: \"Any signs of dehydration?\" (e.g., dry mouth [not just dry lips], too weak to stand, dizziness, new weight loss) \"When did you last urinate?\"      no  9. EXPOSURE: \"Have you traveled to a foreign country recently?\" \"Have you been exposed to anyone with diarrhea?\" \"Could you have eaten any food that was spoiled?\"      no  10. ANTIBIOTIC USE: \"Are you taking antibiotics now or have you taken antibiotics in the past 2 months?\"        no  11. OTHER SYMPTOMS: \"Do you have any other symptoms?\" (e.g., fever, blood in stool)        no  12. PREGNANCY: \"Is there any chance you are pregnant?\" \"When was your last menstrual period?\"        no    Protocols used: DIARRHEA-ADULT-AH      "

## 2020-11-30 NOTE — PROGRESS NOTES
Transitional Care Follow Up Visit  Subjective     Checo Michele is a 89 y.o. male who presents for a transitional care management visit.    Within 48 business hours after discharge our office contacted him via telephone to coordinate his care and needs.      I reviewed and discussed the details of that call along with the discharge summary, hospital problems, inpatient lab results, inpatient diagnostic studies, and consultation reports with Checo.     Current outpatient and discharge medications have been reconciled for the patient.  Reviewed by: Marilyn K Vermeesch, MD      Date of TCM Phone Call 11/25/2020   [facility]   Date of Admission 11/24/2020   Date of Discharge 11/25/2020   Discharge Disposition Home or Self Care     Risk for Readmission (LACE) Score: 3 (11/25/2020  6:00 AM)      History of Present Illness   Course During Hospital Stay: see discharge summary below.    The patient is a 89-year-old gentleman with past medical history of hypertension, coronary artery disease, chronic abdominal pain, anxiety, BPH, sleep apnea, prior seizure disorder.  The patient presented to the emergency room last night with abdominal pain and was constipated without a bowel movement.  In the emergency room, he was noted to be bradycardic with an EKG showing complete heart block.  Dr. Silver with cardiology was consulted and evaluated the patient. Pressure was in the 180s over 100 range.  Not hypoxic.  Troponin was negative.  CBC was unremarkable.  CMP with mild hyponatremia of 131.  CT scan abdomen pelvis and CT scan of the chest demonstrated cardiomegaly with moderate pleural effusions, small pericardial effusion.  There is also evidence of moderate amount of air in the large and small bowel with possible ileus.  Patient given multiple doses of atropine.  Cardiology recommended the hospitalist service admit to the ICU overnight with close monitoring and consideration of transfer to [facility]  [facility] if pacemaker is needed.     Dr. Silver saw the patient and states the patient will require permanent pacemaker placement.  He has spoken to Dr. Frazier, who has agreed to accept the patient in consultation and the patient will be transferred straight to [facility] for pacemaker placement today to cardiology service. He has no symptoms of covid-19 but rapid screening test is pending prior to transfer. Patient still has bradycardia with complete heart block and is currently stable for transfer to tertiary care facility, [facility].     Pt was transferred to [facility] and underwent pacemaker placement per Dr Frazier.  Per daughter, he was kept overnight and sent home.  Since home, his ankles and feet have started to swell.  Daughter admits that patient drinks primarily root beer instead of water.  He also sits with feet dangling during the day.  He has started to have some loose stools, unusual for him, so daughter had to give him some imodium.  He had been eating well for a few days.  He denies any CP or SOA.  Daughter has noted he has not been getting his PM dose of coreg.       The following portions of the patient's history were reviewed and updated as appropriate: allergies, current medications, past family history, past medical history, past social history, past surgical history and problem list.    Review of Systems   Constitutional: Positive for fatigue. Negative for activity change and appetite change.   HENT: Positive for hearing loss.    Respiratory: Negative for shortness of breath.    Cardiovascular: Positive for leg swelling. Negative for chest pain and palpitations.   Gastrointestinal: Positive for diarrhea. Negative for abdominal pain and constipation.   Psychiatric/Behavioral: Positive for confusion.       Objective   Physical Exam  Vitals signs and nursing note reviewed.   Constitutional:       General: He is not in acute distress.     Appearance: Normal appearance. He is not  ill-appearing.      Comments: Pleasantly confused elderly man, hard of hearing, in no distress today   HENT:      Head: Normocephalic and atraumatic.      Right Ear: External ear normal.      Left Ear: External ear normal.   Eyes:      General:         Right eye: No discharge.         Left eye: No discharge.      Extraocular Movements: Extraocular movements intact.   Cardiovascular:      Rate and Rhythm: Normal rate and regular rhythm.      Pulses: Normal pulses.      Heart sounds: Normal heart sounds. No murmur.      Comments: Right groin incision is clean and dry, there is overlying scab from recent catheterization.  There is significant bruising of the right medial groin noted  Pulmonary:      Effort: Pulmonary effort is normal. No respiratory distress.      Breath sounds: Normal breath sounds. No wheezing or rhonchi.   Abdominal:      General: Abdomen is flat. There is no distension.      Palpations: Abdomen is soft.      Tenderness: There is no abdominal tenderness.   Musculoskeletal:      Right lower leg: Edema present.      Left lower leg: Edema present.      Comments: +2 edema to mid shins bilaterally   Skin:     General: Skin is dry.      Findings: No rash.   Neurological:      General: No focal deficit present.      Mental Status: He is alert. Mental status is at baseline.      Cranial Nerves: No cranial nerve deficit.      Motor: Weakness present.      Gait: Gait abnormal.      Comments: Ambulates with use of a cane   Psychiatric:         Mood and Affect: Mood normal.      Comments: Pleasantly confused, somewhat quiet today, only speaks when he is asked a question and allows his daughter to answer all questions today         Assessment/Plan   Diagnoses and all orders for this visit:    1. Complete heart block (CMS/HCC) (Primary)    2. Constipation, unspecified constipation type    Other orders  -     carvedilol (COREG) 12.5 MG tablet; Take 1 tablet by mouth 2 (Two) Times a Day With Meals.    Resume  carvedilol 12.5 mg twice daily to help with blood pressure management.  This will not affect heart rate as he now has pacemaker in place  Recommend compression stockings on in a.m. and off in p.m.  Encourage daughter to decrease amount of sodium in diet, specifically discontinue all sodas and encourage water intake  Encouraged daughter to monitor blood pressure  We will arrange home health to monitor blood pressure and heart rate as well as legs for edema for the next 2 to 3 weeks  Elevate legs as much as possible throughout the day  Medication list reviewed and reconciled today  Known history of constipation however recent loose stools, he is having a good appetite    Return to clinic in 4 weeks for follow-up of edema and constipation issues

## 2020-11-30 NOTE — TELEPHONE ENCOUNTER
Reviewed guideline with caller, advises her father be seen within 24 hours. She states he has an appointment with his PCP this afternoon. Advised to have him elevate his feet.     Reason for Disposition  • [1] MODERATE leg swelling (e.g., swelling extends up to knees) AND [2] new onset or worsening    Additional Information  • Negative: Chest pain  • Negative: Followed an ankle injury  • Negative: Ankle pain is main symptom  • Swelling of both ankles (i.e., pedal edema)  • Negative: Severe difficulty breathing (e.g., struggling for each breath, speaks in single words)  • Negative: Looks like a broken bone or dislocated joint (e.g., crooked or deformed)  • Negative: Sounds like a life-threatening emergency to the triager  • Negative: Chest pain  • Negative: Followed a leg injury  • Negative: [1] Small area of swelling AND [2] followed an insect bite to the area  • Negative: Swelling of one ankle joint  • Negative: Swelling of knee is main symptom  • Negative: Pregnant  • Negative: Postpartum (from 0 to 6 weeks after delivery)  • Negative: Difficulty breathing at rest  • Negative: Entire foot is cool or blue in comparison to other side  • Negative: [1] Can't walk or can barely walk AND [2] new onset  • Negative: [1] Difficulty breathing with exertion (e.g., walking) AND [2] new onset or worsening  • Negative: [1] Red area or streak AND [2] fever  • Negative: [1] Swelling is painful to touch AND [2] fever  • Negative: [1] Cast on leg or ankle AND [2] now increased pain  • Negative: Patient sounds very sick or weak to the triager  • Negative: SEVERE leg swelling (e.g., swelling extends above knee, entire leg is swollen, weeping fluid)  • Negative: [1] Red area or streak [2] large (> 2 in. or 5 cm)  • Negative: [1] Thigh or calf pain AND [2] only 1 side AND [3] present > 1 hour  • Negative: [1] Thigh, calf, or ankle swelling AND [2] only 1 side  • Negative: [1] Thigh, calf, or ankle swelling AND [2] bilateral AND [3] 1  "side is more swollen    Answer Assessment - Initial Assessment Questions  1. LOCATION: \"Which joint is swollen?\"      Both ankles   2. ONSET: \"When did the swelling start?\"      A little bit yesterday   3. SIZE: \"How large is the swelling?\"      Twice the size of normal  4. PAIN: \"Is there any pain?\" If so, ask: \"How bad is it?\" (Scale 1-10; or mild, moderate, severe)      no  5. CAUSE: \"What do you think caused the swollen joint?\"      unknown  6. OTHER SYMPTOMS: \"Do you have any other symptoms?\" (e.g., fever, chest pain, difficulty breathing, calf pain)      no  7. PREGNANCY: \"Is there any chance you are pregnant?\" \"When was your last menstrual period?\"      na    Answer Assessment - Initial Assessment Questions  1. ONSET: \"When did the swelling start?\" (e.g., minutes, hours, days)       Little bit yesterday   2. LOCATION: \"What part of the leg is swollen?\"  \"Are both legs swollen or just one leg?\"      Both legs from calves down   3. SEVERITY: \"How bad is the swelling?\" (e.g., localized; mild, moderate, severe)   - Localized - small area of swelling localized to one leg   - MILD pedal edema - swelling limited to foot and ankle, pitting edema < 1/4 inch (6 mm) deep, rest and elevation eliminate most or all swelling   - MODERATE edema - swelling of lower leg to knee, pitting edema > 1/4 inch (6 mm) deep, rest and elevation only partially reduce swelling   - SEVERE edema - swelling extends above knee, facial or hand swelling present       Twice normal size- Moderate  4. REDNESS: \"Does the swelling look red or infected?\"      no  5. PAIN: \"Is the swelling painful to touch?\" If so, ask: \"How painful is it?\"   (Scale 1-10; mild, moderate or severe)      no  6. FEVER: \"Do you have a fever?\" If so, ask: \"What is it, how was it measured, and when did it start?\"       no  7. CAUSE: \"What do you think is causing the leg swelling?\"      unknown  8. MEDICAL HISTORY: \"Do you have a history of heart failure, kidney disease, " "liver failure, or cancer?\"      no  9. RECURRENT SYMPTOM: \"Have you had leg swelling before?\" If so, ask: \"When was the last time?\" \"What happened that time?\"      no  10. OTHER SYMPTOMS: \"Do you have any other symptoms?\" (e.g., chest pain, difficulty breathing)        no  11. PREGNANCY: \"Is there any chance you are pregnant?\" \"When was your last menstrual period?\"        na    Protocols used: LEG SWELLING AND EDEMA-ADULT-AH, ANKLE SWELLING-ADULT-AH      "

## 2020-12-02 NOTE — TELEPHONE ENCOUNTER
Patient had a Micra placed on 11-24-20. His daughter called today to let you know that he has been experiencing edema in his legs, ankles and feet for the past 3 to 4 days. She states that it seems to be getting worse. He saw his PCP, Dr. Vermeesch, yesterday. She started him on carvedilol 12.5 mg BID, recommended that he wear compression stockings, encouraged him to elevate his feet, arranged home health, instructed him to watch his sodium intake, encouraged water instead of sodas and instructed his daughter to monitor his BP daily. He denies any pain or shortness of breath. He has not gained any weight. When he was admitted for his procedure he weighed 136 pounds and on 11/30/20 he weighed 131 pounds at his PCP's office. His BP was 152/88 with a HR of 76 bpm on 11/30/20. Please advise.

## 2020-12-02 NOTE — TELEPHONE ENCOUNTER
DAUGHTER JIMBO IS CALLING TO CHECK ON THE STATUS OF REFERRAL FOR HOME HEALTH.      PLEASE ADVISE  [phone]

## 2020-12-03 NOTE — PROGRESS NOTES
[facility] Cardiology Office Follow Up Note    Checo Michele  [id number]  2020    Primary Care Provider: Vermeesch, Marilyn K, MD    Chief Complaint: Hospital follow-up    History of Present Illness:   Mr. Checo Michele is a 89 y.o. male who presents to the cardiology clinic for hospital follow-up.  The patient has a past medical history significant for hypertension, BPH, anxiety, and chronic right lower quadrant abdominal pain of unclear etiology.  He has a past cardiac history reportedly significant for coronary artery disease, however it is unclear what type of ischemic evaluation he has had in the past.  He usually presented to the [facility] on  for evaluation of abdominal pain.  At that time, he was found to be in complete heart block with a heart rates 25-30 bpm.  Other than fatigue, the patient was minimally symptomatic and hemodynamically stable.  He was subsequently transferred to [facility] and underwent implantation of a Medtronic leadless pacemaker.  The patient did well, and was discharged home the following day.  He presents the cardiology clinic today for hospital follow-up.  Since discharge, the patient reports he has been well overall.  He denies any dizziness, lightheadedness, presyncopal episodes.  He was hypertensive after his carvedilol was held on discharge.  It was subsequently restarted by his primary care physician, and his blood pressure has been better controlled since restarting the medication.  He does complain of bilateral lower extremity edema which was present shortly after discharge from the hospital.  He has started using compression stockings, with slow improvement in his lower extremity edema.  He denies any orthopnea or PND.  No significant exertional dyspnea.  He denies chest pain or chest discomfort.  No other specific complaints at this time.    Past Cardiac Testin. Last Coronary Angio:  Remote, report unavailable  2. Prior Stress Testing: None  3. Last Echo: 11/24/2020   1.  Normal left ventricular size and systolic function, LVEF 60-65%.   2.  Mild concentric LVH.   3.  Moderately increased left atrial volume index.   4.  Grade 2 diastolic dysfunction.   5.  Normal right ventricular size and systolic function.   6.  Mild right atrial dilation.   7.  Mild calcification of the aortic valve without significant stenosis.   8.  Mild aortic regurgitation.   9.  Mild mitral regurgitation.   10.  Mild tricuspid regurgitation, RVSP 45 mmHg.   11.  Moderate bilateral pleural effusions.  4. Prior Holter Monitor: None    Review of Systems:   Review of Systems   Constitutional: Positive for fatigue. Negative for activity change, appetite change, chills, diaphoresis, fever, unexpected weight gain and unexpected weight loss.   Eyes: Negative for blurred vision and double vision.   Respiratory: Negative for cough, chest tightness, shortness of breath and wheezing.    Cardiovascular: Positive for leg swelling. Negative for chest pain and palpitations.   Gastrointestinal: Negative for abdominal pain, anal bleeding, blood in stool and GERD.   Endocrine: Negative for cold intolerance and heat intolerance.   Genitourinary: Negative for hematuria.   Neurological: Negative for dizziness, syncope, weakness and light-headedness.   Hematological: Does not bruise/bleed easily.   Psychiatric/Behavioral: Negative for depressed mood and stress. The patient is not nervous/anxious.        I have reviewed and/or updated the patient's past medical, past surgical, family, social history, problem list and allergies as appropriate.     Medications:     Current Outpatient Medications:   •  acetaminophen (TYLENOL) 325 MG tablet, Take 2 tablets by mouth Every 4 (Four) Hours As Needed for Mild Pain ., Disp: 60 tablet, Rfl: 0  •  aspirin 81 MG tablet, Take  by mouth daily., Disp: , Rfl:   •  atorvastatin (LIPITOR) 20 MG tablet, TAKE 1  "TABLET DAILY (Patient taking differently: One in the evening), Disp: 90 tablet, Rfl: 4  •  candesartan (ATACAND) 4 MG tablet, TAKE 1 TABLET DAILY FOR BLOOD PRESSURE, Disp: 90 tablet, Rfl: 3  •  carvedilol (COREG) 12.5 MG tablet, Take 1 tablet by mouth 2 (Two) Times a Day With Meals., Disp: , Rfl:   •  clonazePAM (KlonoPIN) 1 MG tablet, TAKE 1 TABLET BY MOUTH EVERY EVENING AS NEEDED FOR SEIZURES (Patient taking differently: Take 1 mg by mouth At Night As Needed.), Disp: 90 tablet, Rfl: 0  •  escitalopram (LEXAPRO) 10 MG tablet, Take 1 tablet by mouth Daily., Disp: 90 tablet, Rfl: 3  •  levothyroxine (SYNTHROID, LEVOTHROID) 50 MCG tablet, TAKE 1 TABLET DAILY, Disp: 90 tablet, Rfl: 3  •  tolterodine LA (DETROL LA) 4 MG 24 hr capsule, TAKE 1 CAPSULE DAILY (Patient taking differently: QHS), Disp: 90 capsule, Rfl: 3  •  furosemide (LASIX) 20 MG tablet, Take 1 tablet by mouth Daily., Disp: 30 tablet, Rfl: 0    Physical Exam:  Vital Signs:   Vitals:    12/03/20 1123   BP: 122/84   Pulse: 53   Resp: 18   Temp: 97.5 °F (36.4 °C)   SpO2: 92%   Weight: 57.2 kg (126 lb)   Height: 170.2 cm (67\")       Physical Exam  Constitutional:       Appearance: He is well-developed. He is not diaphoretic.      Comments: Elderly male in no acute distress.   HENT:      Head: Normocephalic and atraumatic.   Eyes:      General: No scleral icterus.     Pupils: Pupils are equal, round, and reactive to light.   Neck:      Musculoskeletal: Neck supple. No muscular tenderness.      Trachea: No tracheal deviation.   Cardiovascular:      Rate and Rhythm: Normal rate and regular rhythm.      Heart sounds: Normal heart sounds. No murmur. No friction rub. No gallop.       Comments: Normal JVD.  Pulmonary:      Effort: Pulmonary effort is normal. No respiratory distress.      Breath sounds: Normal breath sounds. No stridor. No wheezing or rales.   Chest:      Chest wall: No tenderness.   Abdominal:      General: Bowel sounds are normal. There is no " distension.      Palpations: Abdomen is soft.      Tenderness: There is no abdominal tenderness. There is no guarding or rebound.   Musculoskeletal: Normal range of motion.      Comments: 1+ bilateral lower extremity edema   Lymphadenopathy:      Cervical: No cervical adenopathy.   Skin:     General: Skin is warm and dry.      Findings: No erythema.      Comments: Right groin ecchymoses   Neurological:      General: No focal deficit present.      Mental Status: He is alert and oriented to person, place, and time.   Psychiatric:         Mood and Affect: Mood normal.         Behavior: Behavior normal.         Results Review:   I reviewed the patient's new clinical results.        Assessment / Plan:     1.  Complete AV block  --Presented in 11/2020 with high degree 2-1 AV block, subsequently progressed to complete AV block  --Now status post Medtronic leadless pacemaker implantation  --In the process of setting up remote home monitoring  --Has post procedure follow-up scheduled with electrophysiology, then plan transition further management to [facility] given the patient's wishes to stay locally   --Follow-up in 6 months, or sooner if required     2.  Hypertension  --Improved control with restarting carvedilol     3.  Coronary artery disease  --Reported history of CAD, however prior ischemic evaluation is unclear  --No current chest pain or anginal symptoms  --Continue daily aspirin and statin therapy    4.  Lower extremity edema  --Mild bilateral lower extremity edema on exam today  --Recent echocardiogram showed normal LV stock function, grade 2 diastolic dysfunction  --Will obtain proBNP to further evaluate for cardiac volume overload  --If BNP elevated, will start short course of oral Lasix for diuresis    Follow Up:   Return in about 6 months (around 6/3/2021).      Thank you for allowing me to participate in the care of your patient. Please to not hesitate to contact me with additional questions or  concerns.     B. Stalin Silver MD  Interventional Cardiology   12/03/2020  11:26 EST

## 2020-12-03 NOTE — TELEPHONE ENCOUNTER
Contacted patient's daughter and notified her that referral had been placed on 11/30/2020 and it could take up to 2 weeks for referral to be processed but she would be contacted when appointment was ready to be scheduled; daughter expressed understanding and stated she would follow up as needed.

## 2020-12-07 NOTE — TELEPHONE ENCOUNTER
Wife states that patient's edema has resolved. He has taken 2 doses of Lasix and she wanted to know if he still needs to take it for 3 more days. Advise

## 2020-12-28 NOTE — PROGRESS NOTES
Chief Complaint   Patient presents with   • Follow-up     4 weeks for edema and constipation issues     Subjective   Checo Michele is a 89 y.o. male.     Here today for follow-up of hospitalization visit.  On November 30 I saw him for transitional care visit.  He had just been in the hospital and had a complete heart block with pacemaker placement.  At that time he was discharged without his carvedilol and was starting to have some hypertension and edema.  His carvedilol 12.5 mg twice daily was resumed, he was encouraged to wear compression stockings during the day and elevate legs as much as possible.  He was also encouraged to stop drinking root beer and avoid salt as much as possible.  He had a follow-up visit with Dr. Silver and a BNP level was obtained, this was just over 1300.  He was given Lasix and took this for 2 to 3 days in a significant amount of diuresis occurred.  His weight is down from 131 to 119 pounds over the last 4 weeks.  He denies any current CP, SOA, palpitations or edema.  Daughter is making sure he is eating oatmeal or raisin bran for breakfast and a plate with a lot of vegetables for lunch.  He may have a rare day of constipation about once a week.  Daughter is now giving him his meds.  She had noted that he often awoke in the middle of the night thinking it was daytime and started to take his medications.  He has not been taking the klonapin at night for the past month or so and daughter states he is doing very well without it.  He is more awake and alert and has been sitting out in the living room with she and his wife more often.  Has not been hibernating in his bedroom as he did before.       The following portions of the patient's history were reviewed and updated as appropriate: allergies, current medications, past family history, past medical history, past social history, past surgical history and problem list.    Review of Systems   Constitutional: Negative for activity  "change, appetite change and unexpected weight change.   Eyes: Negative for visual disturbance.   Respiratory: Negative for shortness of breath.    Cardiovascular: Negative for chest pain, palpitations and leg swelling.   Gastrointestinal: Negative for abdominal pain and constipation.   Musculoskeletal: Positive for arthralgias and gait problem.   Neurological: Negative for headaches.   Psychiatric/Behavioral: Positive for confusion. Negative for dysphoric mood and sleep disturbance. The patient is not nervous/anxious.        Objective   /80   Pulse 75   Temp 97 °F (36.1 °C)   Ht 170.2 cm (67\")   Wt 54 kg (119 lb)   SpO2 96%   BMI 18.64 kg/m²   Body mass index is 18.64 kg/m².  Physical Exam  Vitals signs and nursing note reviewed.   Constitutional:       General: He is not in acute distress.     Appearance: Normal appearance. He is not ill-appearing.      Comments: Pleasant elderly man, appears his age and in no distress today   HENT:      Head: Normocephalic and atraumatic.      Comments: Multiple thick yellow plaques over scalp consistent with SK     Right Ear: External ear normal.      Left Ear: External ear normal.      Ears:      Comments: Very hard of hearing  Eyes:      General:         Right eye: No discharge.         Left eye: No discharge.      Extraocular Movements: Extraocular movements intact.   Cardiovascular:      Rate and Rhythm: Normal rate and regular rhythm.      Pulses: Normal pulses.      Heart sounds: Murmur present.      Comments: Systolic murmur grade 1/6 heard over precordium  Pulmonary:      Effort: Pulmonary effort is normal. No respiratory distress.      Breath sounds: Normal breath sounds.   Musculoskeletal:      Right lower leg: No edema.      Left lower leg: No edema.      Comments: Compression stockings in place, bony thickening of left ankle noted   Neurological:      General: No focal deficit present.      Mental Status: He is alert. Mental status is at baseline.      " Cranial Nerves: No cranial nerve deficit.      Motor: Weakness present.      Gait: Gait normal.      Comments: Ambulates with use of a rolling walker   Psychiatric:         Behavior: Behavior normal.      Comments: Flat affect noted today, he appears slightly depressed, answer some questions but allows his daughter to answer most everything for him today         Assessment/Plan   Checo Michele is here today and the following problems have been addressed:      Diagnoses and all orders for this visit:    1. Essential hypertension (Primary)    2. Other specified hypothyroidism  -     levothyroxine (SYNTHROID, LEVOTHROID) 50 MCG tablet; Take 1 tablet by mouth Daily.  Dispense: 90 tablet; Refill: 3    Other orders  -     Cancel: levothyroxine (SYNTHROID, LEVOTHROID) 50 MCG tablet; Take 1 tablet by mouth Daily.  Dispense: 90 tablet; Refill: 3    BP is well controlled since resuming Coreg  Edema is now completely resolved, continue compression stockings on in a.m. and off in p.m.  Avoid excessive amounts of salt and processed foods, especially sodas  He took Lasix only for 3 days and all edema is resolved, daughter will monitor legs and use Lasix sparingly  Constipation is doing well with only dietary changes, daughter states that she gives him MiraLAX perhaps once a week or less now  He is off Klonopin at this time, daughter states she may use a half a tablet if needed in the future for anxiety    Return to clinic in 3 months or as needed    Please note that portions of this note were completed with a voice recognition program.  Efforts were made to edit dictation, but occasionally words are mistranscribed.

## 2021-01-01 ENCOUNTER — HOSPITAL ENCOUNTER (EMERGENCY)
Facility: HOSPITAL | Age: 86
Discharge: HOME | End: 2021-05-07
Attending: EMERGENCY MEDICINE | Admitting: EMERGENCY MEDICINE

## 2021-01-01 ENCOUNTER — NURSING HOME (OUTPATIENT)
Dept: INTERNAL MEDICINE | Facility: CLINIC | Age: 86
End: 2021-01-01

## 2021-01-01 ENCOUNTER — TELEPHONE (OUTPATIENT)
Dept: INTERNAL MEDICINE | Facility: CLINIC | Age: 86
End: 2021-01-01

## 2021-01-01 ENCOUNTER — PATIENT OUTREACH (OUTPATIENT)
Dept: CASE MANAGEMENT | Facility: OTHER | Age: 86
End: 2021-01-01

## 2021-01-01 ENCOUNTER — OFFICE VISIT (OUTPATIENT)
Dept: ORTHOPEDIC SURGERY | Facility: CLINIC | Age: 86
End: 2021-01-01

## 2021-01-01 ENCOUNTER — APPOINTMENT (OUTPATIENT)
Dept: CT IMAGING | Facility: HOSPITAL | Age: 86
End: 2021-01-01

## 2021-01-01 ENCOUNTER — OUTSIDE FACILITY SERVICE (OUTPATIENT)
Dept: INTERNAL MEDICINE | Facility: CLINIC | Age: 86
End: 2021-01-01

## 2021-01-01 ENCOUNTER — TRANSCRIBE ORDERS (OUTPATIENT)
Dept: HOME HEALTH SERVICES | Facility: HOME HEALTHCARE | Age: 86
End: 2021-01-01

## 2021-01-01 ENCOUNTER — HOSPITAL ENCOUNTER (INPATIENT)
Facility: HOSPITAL | Age: 86
LOS: 4 days | Discharge: NURSING, REHAB OR LONG-TERM CARE | End: 2021-06-15
Attending: EMERGENCY MEDICINE | Admitting: INTERNAL MEDICINE

## 2021-01-01 ENCOUNTER — APPOINTMENT (OUTPATIENT)
Dept: GENERAL RADIOLOGY | Facility: HOSPITAL | Age: 86
End: 2021-01-01

## 2021-01-01 ENCOUNTER — TELEPHONE (OUTPATIENT)
Dept: CARDIOLOGY | Facility: CLINIC | Age: 86
End: 2021-01-01

## 2021-01-01 ENCOUNTER — OFFICE VISIT (OUTPATIENT)
Dept: UROLOGY | Facility: CLINIC | Age: 86
End: 2021-01-01

## 2021-01-01 ENCOUNTER — OFFICE VISIT (OUTPATIENT)
Dept: CARDIOLOGY | Facility: CLINIC | Age: 86
End: 2021-01-01

## 2021-01-01 ENCOUNTER — OFFICE VISIT (OUTPATIENT)
Dept: INTERNAL MEDICINE | Facility: CLINIC | Age: 86
End: 2021-01-01

## 2021-01-01 ENCOUNTER — HOSPITAL ENCOUNTER (EMERGENCY)
Facility: HOSPITAL | Age: 86
Discharge: HOME | End: 2021-01-30
Attending: EMERGENCY MEDICINE | Admitting: EMERGENCY MEDICINE

## 2021-01-01 ENCOUNTER — EPISODE CHANGES (OUTPATIENT)
Dept: CASE MANAGEMENT | Facility: OTHER | Age: 86
End: 2021-01-01

## 2021-01-01 ENCOUNTER — HOSPITAL ENCOUNTER (EMERGENCY)
Facility: HOSPITAL | Age: 86
Discharge: HOME | End: 2021-01-19
Attending: EMERGENCY MEDICINE | Admitting: EMERGENCY MEDICINE

## 2021-01-01 ENCOUNTER — HOSPITAL ENCOUNTER (EMERGENCY)
Facility: HOSPITAL | Age: 86
Discharge: HOME | End: 2021-01-12
Attending: EMERGENCY MEDICINE | Admitting: EMERGENCY MEDICINE

## 2021-01-01 ENCOUNTER — APPOINTMENT (OUTPATIENT)
Dept: CARDIOLOGY | Facility: HOSPITAL | Age: 86
End: 2021-01-01

## 2021-01-01 ENCOUNTER — HOSPITAL ENCOUNTER (EMERGENCY)
Facility: HOSPITAL | Age: 86
Discharge: LEFT BEFORE DISCHARGE | End: 2021-05-24

## 2021-01-01 ENCOUNTER — OFFICE VISIT (OUTPATIENT)
Dept: SURGERY | Facility: CLINIC | Age: 86
End: 2021-01-01

## 2021-01-01 ENCOUNTER — HOSPITAL ENCOUNTER (EMERGENCY)
Facility: HOSPITAL | Age: 86
Discharge: HOME | End: 2021-03-09
Attending: EMERGENCY MEDICINE | Admitting: EMERGENCY MEDICINE

## 2021-01-01 ENCOUNTER — IMMUNIZATION (OUTPATIENT)
Dept: VACCINE CLINIC | Facility: HOSPITAL | Age: 86
End: 2021-01-01

## 2021-01-01 VITALS
HEIGHT: 70 IN | WEIGHT: 127 LBS | SYSTOLIC BLOOD PRESSURE: 120 MMHG | DIASTOLIC BLOOD PRESSURE: 62 MMHG | HEART RATE: 88 BPM | BODY MASS INDEX: 18.18 KG/M2 | OXYGEN SATURATION: 97 % | TEMPERATURE: 97.1 F

## 2021-01-01 VITALS — WEIGHT: 127 LBS | HEIGHT: 69 IN | TEMPERATURE: 96.9 F | BODY MASS INDEX: 18.81 KG/M2

## 2021-01-01 VITALS
OXYGEN SATURATION: 98 % | WEIGHT: 130 LBS | BODY MASS INDEX: 18.61 KG/M2 | RESPIRATION RATE: 16 BRPM | SYSTOLIC BLOOD PRESSURE: 100 MMHG | DIASTOLIC BLOOD PRESSURE: 69 MMHG | HEIGHT: 70 IN | HEART RATE: 84 BPM | TEMPERATURE: 98.7 F

## 2021-01-01 VITALS — TEMPERATURE: 96.4 F | HEIGHT: 70 IN | RESPIRATION RATE: 18 BRPM | WEIGHT: 123 LBS | BODY MASS INDEX: 17.61 KG/M2

## 2021-01-01 VITALS
HEART RATE: 80 BPM | BODY MASS INDEX: 19.37 KG/M2 | DIASTOLIC BLOOD PRESSURE: 58 MMHG | TEMPERATURE: 98.6 F | WEIGHT: 120 LBS | RESPIRATION RATE: 16 BRPM | SYSTOLIC BLOOD PRESSURE: 98 MMHG | OXYGEN SATURATION: 94 %

## 2021-01-01 VITALS
WEIGHT: 118.25 LBS | OXYGEN SATURATION: 97 % | RESPIRATION RATE: 18 BRPM | TEMPERATURE: 97.8 F | BODY MASS INDEX: 16.97 KG/M2 | DIASTOLIC BLOOD PRESSURE: 72 MMHG | HEART RATE: 65 BPM | SYSTOLIC BLOOD PRESSURE: 126 MMHG

## 2021-01-01 VITALS
WEIGHT: 119 LBS | HEIGHT: 70 IN | DIASTOLIC BLOOD PRESSURE: 70 MMHG | HEART RATE: 61 BPM | OXYGEN SATURATION: 95 % | SYSTOLIC BLOOD PRESSURE: 122 MMHG | BODY MASS INDEX: 17.04 KG/M2

## 2021-01-01 VITALS
HEIGHT: 66 IN | WEIGHT: 120 LBS | SYSTOLIC BLOOD PRESSURE: 126 MMHG | BODY MASS INDEX: 19.29 KG/M2 | HEART RATE: 82 BPM | TEMPERATURE: 97.3 F | OXYGEN SATURATION: 98 % | DIASTOLIC BLOOD PRESSURE: 82 MMHG

## 2021-01-01 VITALS
HEART RATE: 74 BPM | DIASTOLIC BLOOD PRESSURE: 76 MMHG | TEMPERATURE: 97.5 F | OXYGEN SATURATION: 97 % | HEIGHT: 66 IN | RESPIRATION RATE: 20 BRPM | SYSTOLIC BLOOD PRESSURE: 128 MMHG | BODY MASS INDEX: 18.48 KG/M2 | WEIGHT: 115 LBS

## 2021-01-01 VITALS
WEIGHT: 123.46 LBS | DIASTOLIC BLOOD PRESSURE: 82 MMHG | TEMPERATURE: 97.8 F | SYSTOLIC BLOOD PRESSURE: 109 MMHG | HEART RATE: 93 BPM | RESPIRATION RATE: 16 BRPM | HEIGHT: 70 IN | BODY MASS INDEX: 17.67 KG/M2 | OXYGEN SATURATION: 96 %

## 2021-01-01 VITALS
RESPIRATION RATE: 18 BRPM | OXYGEN SATURATION: 95 % | SYSTOLIC BLOOD PRESSURE: 127 MMHG | DIASTOLIC BLOOD PRESSURE: 73 MMHG | TEMPERATURE: 97.5 F | BODY MASS INDEX: 17.7 KG/M2 | WEIGHT: 123.38 LBS | HEART RATE: 76 BPM

## 2021-01-01 VITALS — RESPIRATION RATE: 18 BRPM | WEIGHT: 127 LBS | BODY MASS INDEX: 18.81 KG/M2 | TEMPERATURE: 96.8 F | HEIGHT: 69 IN

## 2021-01-01 VITALS
RESPIRATION RATE: 18 BRPM | DIASTOLIC BLOOD PRESSURE: 86 MMHG | HEIGHT: 69 IN | TEMPERATURE: 99.1 F | BODY MASS INDEX: 18.22 KG/M2 | SYSTOLIC BLOOD PRESSURE: 115 MMHG | OXYGEN SATURATION: 95 % | WEIGHT: 123 LBS | HEART RATE: 89 BPM

## 2021-01-01 VITALS
RESPIRATION RATE: 15 BRPM | WEIGHT: 133 LBS | SYSTOLIC BLOOD PRESSURE: 116 MMHG | TEMPERATURE: 96.8 F | BODY MASS INDEX: 19.7 KG/M2 | DIASTOLIC BLOOD PRESSURE: 79 MMHG | HEART RATE: 87 BPM | HEIGHT: 69 IN | OXYGEN SATURATION: 93 %

## 2021-01-01 VITALS — WEIGHT: 123 LBS | BODY MASS INDEX: 17.61 KG/M2 | HEIGHT: 70 IN | RESPIRATION RATE: 18 BRPM

## 2021-01-01 VITALS
WEIGHT: 135 LBS | HEART RATE: 73 BPM | BODY MASS INDEX: 19.99 KG/M2 | SYSTOLIC BLOOD PRESSURE: 114 MMHG | TEMPERATURE: 97 F | HEIGHT: 69 IN | DIASTOLIC BLOOD PRESSURE: 68 MMHG | OXYGEN SATURATION: 90 %

## 2021-01-01 VITALS
HEART RATE: 70 BPM | HEIGHT: 70 IN | BODY MASS INDEX: 17.47 KG/M2 | SYSTOLIC BLOOD PRESSURE: 122 MMHG | DIASTOLIC BLOOD PRESSURE: 70 MMHG | WEIGHT: 122 LBS

## 2021-01-01 VITALS
HEART RATE: 102 BPM | SYSTOLIC BLOOD PRESSURE: 128 MMHG | TEMPERATURE: 98.2 F | DIASTOLIC BLOOD PRESSURE: 80 MMHG | WEIGHT: 123 LBS | BODY MASS INDEX: 17.61 KG/M2 | HEIGHT: 70 IN | OXYGEN SATURATION: 94 %

## 2021-01-01 VITALS
WEIGHT: 119.8 LBS | BODY MASS INDEX: 17.19 KG/M2 | OXYGEN SATURATION: 95 % | SYSTOLIC BLOOD PRESSURE: 132 MMHG | HEART RATE: 78 BPM | TEMPERATURE: 97.2 F | DIASTOLIC BLOOD PRESSURE: 74 MMHG | RESPIRATION RATE: 18 BRPM

## 2021-01-01 VITALS
TEMPERATURE: 97.2 F | SYSTOLIC BLOOD PRESSURE: 116 MMHG | OXYGEN SATURATION: 96 % | RESPIRATION RATE: 18 BRPM | HEART RATE: 90 BPM | DIASTOLIC BLOOD PRESSURE: 54 MMHG

## 2021-01-01 VITALS
RESPIRATION RATE: 20 BRPM | BODY MASS INDEX: 17.81 KG/M2 | WEIGHT: 124.13 LBS | SYSTOLIC BLOOD PRESSURE: 136 MMHG | OXYGEN SATURATION: 95 % | HEART RATE: 77 BPM | TEMPERATURE: 96.9 F | DIASTOLIC BLOOD PRESSURE: 70 MMHG

## 2021-01-01 VITALS
TEMPERATURE: 97.3 F | HEART RATE: 65 BPM | BODY MASS INDEX: 18.78 KG/M2 | DIASTOLIC BLOOD PRESSURE: 60 MMHG | RESPIRATION RATE: 18 BRPM | WEIGHT: 126.8 LBS | HEIGHT: 69 IN | OXYGEN SATURATION: 91 % | SYSTOLIC BLOOD PRESSURE: 114 MMHG

## 2021-01-01 VITALS
RESPIRATION RATE: 18 BRPM | WEIGHT: 127 LBS | HEART RATE: 80 BPM | OXYGEN SATURATION: 99 % | DIASTOLIC BLOOD PRESSURE: 77 MMHG | BODY MASS INDEX: 18.81 KG/M2 | SYSTOLIC BLOOD PRESSURE: 123 MMHG | TEMPERATURE: 96.9 F | HEIGHT: 69 IN

## 2021-01-01 VITALS
OXYGEN SATURATION: 97 % | HEART RATE: 66 BPM | DIASTOLIC BLOOD PRESSURE: 69 MMHG | TEMPERATURE: 97.9 F | SYSTOLIC BLOOD PRESSURE: 122 MMHG | RESPIRATION RATE: 18 BRPM

## 2021-01-01 DIAGNOSIS — S00.03XA CONTUSION OF SCALP, INITIAL ENCOUNTER: Primary | ICD-10-CM

## 2021-01-01 DIAGNOSIS — H91.90 HEARING LOSS, UNSPECIFIED HEARING LOSS TYPE, UNSPECIFIED LATERALITY: ICD-10-CM

## 2021-01-01 DIAGNOSIS — R62.51 FAILURE TO THRIVE (0-17): ICD-10-CM

## 2021-01-01 DIAGNOSIS — F03.90 DEMENTIA WITHOUT BEHAVIORAL DISTURBANCE, UNSPECIFIED DEMENTIA TYPE: Primary | ICD-10-CM

## 2021-01-01 DIAGNOSIS — R41.89 COGNITIVE IMPAIRMENT: ICD-10-CM
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
## 2021-01-01 DIAGNOSIS — F33.1 MODERATE EPISODE OF RECURRENT MAJOR DEPRESSIVE DISORDER (HCC): ICD-10-CM
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
## 2021-01-01 DIAGNOSIS — K56.41 FECAL IMPACTION (HCC): Primary | ICD-10-CM

## 2021-01-01 DIAGNOSIS — F03.91 DEMENTIA WITH BEHAVIORAL DISTURBANCE, UNSPECIFIED DEMENTIA TYPE: ICD-10-CM

## 2021-01-01 DIAGNOSIS — G93.31 POSTVIRAL FATIGUE SYNDROME: Primary | ICD-10-CM

## 2021-01-01 DIAGNOSIS — E03.8 OTHER SPECIFIED HYPOTHYROIDISM: ICD-10-CM

## 2021-01-01 DIAGNOSIS — Z00.00 MEDICARE ANNUAL WELLNESS VISIT, SUBSEQUENT: Primary | ICD-10-CM

## 2021-01-01 DIAGNOSIS — S79.911A INJURY OF RIGHT HIP, INITIAL ENCOUNTER: Primary | ICD-10-CM

## 2021-01-01 DIAGNOSIS — E78.5 HYPERLIPIDEMIA, UNSPECIFIED HYPERLIPIDEMIA TYPE: ICD-10-CM

## 2021-01-01 DIAGNOSIS — K59.00 CONSTIPATION, UNSPECIFIED CONSTIPATION TYPE: Primary | ICD-10-CM

## 2021-01-01 DIAGNOSIS — R53.83 OTHER FATIGUE: Primary | ICD-10-CM

## 2021-01-01 DIAGNOSIS — S70.11XA THIGH HEMATOMA, RIGHT, INITIAL ENCOUNTER: Primary | ICD-10-CM

## 2021-01-01 DIAGNOSIS — R05.9 COUGH: Primary | ICD-10-CM

## 2021-01-01 DIAGNOSIS — I10 ESSENTIAL HYPERTENSION: ICD-10-CM

## 2021-01-01 DIAGNOSIS — F03.91 DEMENTIA WITH BEHAVIORAL DISTURBANCE, UNSPECIFIED DEMENTIA TYPE: Primary | ICD-10-CM

## 2021-01-01 DIAGNOSIS — L40.9 PSORIASIS: ICD-10-CM

## 2021-01-01 DIAGNOSIS — H10.33 ACUTE CONJUNCTIVITIS OF BOTH EYES, UNSPECIFIED ACUTE CONJUNCTIVITIS TYPE: ICD-10-CM

## 2021-01-01 DIAGNOSIS — R62.7 FAILURE TO THRIVE IN ADULT: ICD-10-CM

## 2021-01-01 DIAGNOSIS — R53.1 GENERALIZED WEAKNESS: ICD-10-CM

## 2021-01-01 DIAGNOSIS — T14.8XXA HEMATOMA: ICD-10-CM

## 2021-01-01 DIAGNOSIS — K59.04 CHRONIC IDIOPATHIC CONSTIPATION: ICD-10-CM

## 2021-01-01 DIAGNOSIS — R54 FRAILTY: ICD-10-CM

## 2021-01-01 DIAGNOSIS — Z91.09 ENVIRONMENTAL ALLERGIES: ICD-10-CM

## 2021-01-01 DIAGNOSIS — I10 ESSENTIAL HYPERTENSION: Primary | ICD-10-CM

## 2021-01-01 DIAGNOSIS — N40.1 BENIGN PROSTATIC HYPERPLASIA WITH LOWER URINARY TRACT SYMPTOMS, SYMPTOM DETAILS UNSPECIFIED: Primary | ICD-10-CM
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
## 2021-01-01 DIAGNOSIS — J40 BRONCHITIS: ICD-10-CM

## 2021-01-01 DIAGNOSIS — M17.11 PRIMARY OSTEOARTHRITIS OF RIGHT KNEE: ICD-10-CM

## 2021-01-01 DIAGNOSIS — R29.6 FREQUENT FALLS: Primary | ICD-10-CM

## 2021-01-01 DIAGNOSIS — K59.00 CONSTIPATION, UNSPECIFIED CONSTIPATION TYPE: ICD-10-CM

## 2021-01-01 DIAGNOSIS — J69.0 ASPIRATION PNEUMONIA, UNSPECIFIED ASPIRATION PNEUMONIA TYPE, UNSPECIFIED LATERALITY, UNSPECIFIED PART OF LUNG (HCC): Primary | ICD-10-CM

## 2021-01-01 DIAGNOSIS — K59.04 CHRONIC IDIOPATHIC CONSTIPATION: Primary | ICD-10-CM

## 2021-01-01 DIAGNOSIS — I44.2 COMPLETE HEART BLOCK (HCC): Primary | ICD-10-CM

## 2021-01-01 DIAGNOSIS — S51.011A SKIN TEAR OF RIGHT ELBOW WITHOUT COMPLICATION, INITIAL ENCOUNTER: ICD-10-CM

## 2021-01-01 DIAGNOSIS — I46.9 CARDIOPULMONARY ARREST (HCC): Primary | ICD-10-CM

## 2021-01-01 DIAGNOSIS — K64.2 GRADE III HEMORRHOIDS: ICD-10-CM

## 2021-01-01 DIAGNOSIS — E03.9 ACQUIRED HYPOTHYROIDISM: ICD-10-CM

## 2021-01-01 DIAGNOSIS — K64.8 INTERNAL HEMORRHOIDS: ICD-10-CM

## 2021-01-01 DIAGNOSIS — S70.01XA CONTUSION OF RIGHT HIP, INITIAL ENCOUNTER: Primary | ICD-10-CM

## 2021-01-01 DIAGNOSIS — R29.6 FREQUENT FALLS: ICD-10-CM

## 2021-01-01 DIAGNOSIS — R13.10 DYSPHAGIA, UNSPECIFIED TYPE: ICD-10-CM

## 2021-01-01 DIAGNOSIS — K21.9 GASTROESOPHAGEAL REFLUX DISEASE WITHOUT ESOPHAGITIS: ICD-10-CM

## 2021-01-01 DIAGNOSIS — J90 BILATERAL PLEURAL EFFUSION: Primary | ICD-10-CM

## 2021-01-01 LAB
ALBUMIN SERPL-MCNC: 2.6 G/DL (ref 3.5–5.2)
ALBUMIN SERPL-MCNC: 2.7 G/DL (ref 3.5–5.2)
ALBUMIN SERPL-MCNC: 3.3 G/DL (ref 3.5–5.2)
ALBUMIN SERPL-MCNC: 3.6 G/DL (ref 3.5–5.2)
ALBUMIN SERPL-MCNC: 3.8 G/DL (ref 3.5–5.2)
ALBUMIN/GLOB SERPL: 1 G/DL
ALBUMIN/GLOB SERPL: 1.1 G/DL
ALBUMIN/GLOB SERPL: 1.3 G/DL
ALBUMIN/GLOB SERPL: 1.4 G/DL
ALP SERPL-CCNC: 117 U/L (ref 39–117)
ALP SERPL-CCNC: 85 U/L (ref 39–117)
ALP SERPL-CCNC: 86 U/L (ref 39–117)
ALP SERPL-CCNC: 97 U/L (ref 39–117)
ALT SERPL W P-5'-P-CCNC: 10 U/L (ref 1–41)
ALT SERPL W P-5'-P-CCNC: 10 U/L (ref 1–41)
ALT SERPL W P-5'-P-CCNC: 13 U/L (ref 1–41)
ALT SERPL W P-5'-P-CCNC: 16 U/L (ref 1–41)
ANION GAP SERPL CALCULATED.3IONS-SCNC: 10.1 MMOL/L (ref 5–15)
ANION GAP SERPL CALCULATED.3IONS-SCNC: 10.2 MMOL/L (ref 5–15)
ANION GAP SERPL CALCULATED.3IONS-SCNC: 10.6 MMOL/L (ref 5–15)
ANION GAP SERPL CALCULATED.3IONS-SCNC: 5.2 MMOL/L (ref 5–15)
ANION GAP SERPL CALCULATED.3IONS-SCNC: 6.5 MMOL/L (ref 5–15)
ANION GAP SERPL CALCULATED.3IONS-SCNC: 6.8 MMOL/L (ref 5–15)
ANION GAP SERPL CALCULATED.3IONS-SCNC: 7.4 MMOL/L (ref 5–15)
AST SERPL-CCNC: 18 U/L (ref 1–40)
AST SERPL-CCNC: 22 U/L (ref 1–40)
AST SERPL-CCNC: 23 U/L (ref 1–40)
AST SERPL-CCNC: 34 U/L (ref 1–40)
BACTERIA SPEC AEROBE CULT: NORMAL
BACTERIA SPEC AEROBE CULT: NORMAL
BASOPHILS # BLD AUTO: 0.03 10*3/MM3 (ref 0–0.2)
BASOPHILS # BLD AUTO: 0.04 10*3/MM3 (ref 0–0.2)
BASOPHILS # BLD AUTO: 0.05 10*3/MM3 (ref 0–0.2)
BASOPHILS # BLD AUTO: 0.05 10*3/MM3 (ref 0–0.2)
BASOPHILS NFR BLD AUTO: 0.5 % (ref 0–1.5)
BASOPHILS NFR BLD AUTO: 0.7 % (ref 0–1.5)
BASOPHILS NFR BLD AUTO: 0.8 % (ref 0–1.5)
BASOPHILS NFR BLD AUTO: 1.1 % (ref 0–1.5)
BH CV ECHO MEAS - % IVS THICK: 54.3 %
BH CV ECHO MEAS - % LVPW THICK: 91.7 %
BH CV ECHO MEAS - AI DEC SLOPE: 174 CM/SEC^2
BH CV ECHO MEAS - AI MAX PG: 32.3 MMHG
BH CV ECHO MEAS - AI MAX VEL: 284 CM/SEC
BH CV ECHO MEAS - AI P1/2T: 478.1 MSEC
BH CV ECHO MEAS - AO MAX PG (FULL): 3.8 MMHG
BH CV ECHO MEAS - AO MAX PG: 5 MMHG
BH CV ECHO MEAS - AO MEAN PG (FULL): 1 MMHG
BH CV ECHO MEAS - AO MEAN PG: 2 MMHG
BH CV ECHO MEAS - AO V2 MAX: 110 CM/SEC
BH CV ECHO MEAS - AO V2 MEAN: 71.9 CM/SEC
BH CV ECHO MEAS - AO V2 VTI: 17.3 CM
BH CV ECHO MEAS - AVA(I,A): 1.9 CM^2
BH CV ECHO MEAS - AVA(I,D): 1.9 CM^2
BH CV ECHO MEAS - AVA(V,A): 1.6 CM^2
BH CV ECHO MEAS - AVA(V,D): 1.6 CM^2
BH CV ECHO MEAS - BSA(HAYCOCK): 1.7 M^2
BH CV ECHO MEAS - BSA: 1.7 M^2
BH CV ECHO MEAS - BZI_BMI: 17.8 KILOGRAMS/M^2
BH CV ECHO MEAS - BZI_METRIC_HEIGHT: 177.8 CM
BH CV ECHO MEAS - BZI_METRIC_WEIGHT: 56.2 KG
BH CV ECHO MEAS - EDV(CUBED): 63 ML
BH CV ECHO MEAS - EDV(MOD-SP2): 95.7 ML
BH CV ECHO MEAS - EDV(MOD-SP4): 78.5 ML
BH CV ECHO MEAS - EDV(TEICH): 69.2 ML
BH CV ECHO MEAS - EF(CUBED): 73.1 %
BH CV ECHO MEAS - EF(MOD-BP): 27.4 %
BH CV ECHO MEAS - EF(MOD-SP2): 29.9 %
BH CV ECHO MEAS - EF(MOD-SP4): 28.5 %
BH CV ECHO MEAS - EF(TEICH): 65.4 %
BH CV ECHO MEAS - ESV(CUBED): 17 ML
BH CV ECHO MEAS - ESV(MOD-SP2): 67.1 ML
BH CV ECHO MEAS - ESV(MOD-SP4): 56.1 ML
BH CV ECHO MEAS - ESV(TEICH): 23.9 ML
BH CV ECHO MEAS - FS: 35.4 %
BH CV ECHO MEAS - IVS/LVPW: 1.3
BH CV ECHO MEAS - IVSD: 0.92 CM
BH CV ECHO MEAS - IVSS: 1.4 CM
BH CV ECHO MEAS - LA DIMENSION: 4.2 CM
BH CV ECHO MEAS - LAD MAJOR: 5.5 CM
BH CV ECHO MEAS - LAT PEAK E' VEL: 3.8 CM/SEC
BH CV ECHO MEAS - LATERAL E/E' RATIO: 23.1
BH CV ECHO MEAS - LV DIASTOLIC VOL/BSA (35-75): 46.1 ML/M^2
BH CV ECHO MEAS - LV MASS(C)D: 95.8 GRAMS
BH CV ECHO MEAS - LV MASS(C)DI: 56.3 GRAMS/M^2
BH CV ECHO MEAS - LV MASS(C)S: 115.3 GRAMS
BH CV ECHO MEAS - LV MASS(C)SI: 67.7 GRAMS/M^2
BH CV ECHO MEAS - LV MAX PG: 1.2 MMHG
BH CV ECHO MEAS - LV MEAN PG: 1 MMHG
BH CV ECHO MEAS - LV SYSTOLIC VOL/BSA (12-30): 32.9 ML/M^2
BH CV ECHO MEAS - LV V1 MAX: 55.7 CM/SEC
BH CV ECHO MEAS - LV V1 MEAN: 37.6 CM/SEC
BH CV ECHO MEAS - LV V1 VTI: 10.4 CM
BH CV ECHO MEAS - LVIDD: 4 CM
BH CV ECHO MEAS - LVIDS: 2.6 CM
BH CV ECHO MEAS - LVLD AP2: 7.6 CM
BH CV ECHO MEAS - LVLD AP4: 7.5 CM
BH CV ECHO MEAS - LVLS AP2: 7.3 CM
BH CV ECHO MEAS - LVLS AP4: 6.7 CM
BH CV ECHO MEAS - LVOT AREA (M): 3.1 CM^2
BH CV ECHO MEAS - LVOT AREA: 3.1 CM^2
BH CV ECHO MEAS - LVOT DIAM: 2 CM
BH CV ECHO MEAS - LVPWD: 0.72 CM
BH CV ECHO MEAS - LVPWS: 1.4 CM
BH CV ECHO MEAS - MED PEAK E' VEL: 4.5 CM/SEC
BH CV ECHO MEAS - MEDIAL E/E' RATIO: 19.8
BH CV ECHO MEAS - MR MAX PG: 94 MMHG
BH CV ECHO MEAS - MR MAX VEL: 484 CM/SEC
BH CV ECHO MEAS - MR MEAN PG: 59 MMHG
BH CV ECHO MEAS - MR MEAN VEL: 354 CM/SEC
BH CV ECHO MEAS - MR VTI: 205 CM
BH CV ECHO MEAS - MV DEC TIME: 0.15 SEC
BH CV ECHO MEAS - MV E MAX VEL: 88.2 CM/SEC
BH CV ECHO MEAS - MV MAX PG: 2.9 MMHG
BH CV ECHO MEAS - MV MEAN PG: 1.5 MMHG
BH CV ECHO MEAS - MV V2 MAX: 85.4 CM/SEC
BH CV ECHO MEAS - MV V2 MEAN: 55.3 CM/SEC
BH CV ECHO MEAS - MV V2 VTI: 16.4 CM
BH CV ECHO MEAS - MVA(VTI): 2 CM^2
BH CV ECHO MEAS - PA ACC TIME: 0.12 SEC
BH CV ECHO MEAS - PA MAX PG (FULL): 0.42 MMHG
BH CV ECHO MEAS - PA MAX PG: 1.3 MMHG
BH CV ECHO MEAS - PA MEAN PG (FULL): 1 MMHG
BH CV ECHO MEAS - PA MEAN PG: 1 MMHG
BH CV ECHO MEAS - PA PR(ACCEL): 26.8 MMHG
BH CV ECHO MEAS - PA V2 MAX: 57.4 CM/SEC
BH CV ECHO MEAS - PA V2 MEAN: 38.7 CM/SEC
BH CV ECHO MEAS - PA V2 VTI: 12.3 CM
BH CV ECHO MEAS - RV MAX PG: 0.89 MMHG
BH CV ECHO MEAS - RV MEAN PG: 0 MMHG
BH CV ECHO MEAS - RV V1 MAX: 47.3 CM/SEC
BH CV ECHO MEAS - RV V1 MEAN: 31.4 CM/SEC
BH CV ECHO MEAS - RV V1 VTI: 12 CM
BH CV ECHO MEAS - SI(CUBED): 27 ML/M^2
BH CV ECHO MEAS - SI(LVOT): 18.8 ML/M^2
BH CV ECHO MEAS - SI(MOD-SP2): 16.8 ML/M^2
BH CV ECHO MEAS - SI(MOD-SP4): 13.1 ML/M^2
BH CV ECHO MEAS - SI(TEICH): 26.6 ML/M^2
BH CV ECHO MEAS - SV(CUBED): 46.1 ML
BH CV ECHO MEAS - SV(LVOT): 32 ML
BH CV ECHO MEAS - SV(MOD-SP2): 28.6 ML
BH CV ECHO MEAS - SV(MOD-SP4): 22.4 ML
BH CV ECHO MEAS - SV(TEICH): 45.3 ML
BH CV ECHO MEAS - TAPSE (>1.6): 1.4 CM
BH CV ECHO MEAS - TR MAX PG: 20 MMHG
BH CV ECHO MEAS - TR MAX VEL: 224 CM/SEC
BH CV ECHO MEASUREMENTS AVERAGE E/E' RATIO: 21.25
BH CV XLRA - RV BASE: 3.5 CM
BH CV XLRA - RV LENGTH: 6.4 CM
BH CV XLRA - RV MID: 1.7 CM
BH CV XLRA - TDI S': 10.3 CM/SEC
BILIRUB BLD-MCNC: NEGATIVE MG/DL
BILIRUB SERPL-MCNC: 1.3 MG/DL (ref 0–1.2)
BILIRUB SERPL-MCNC: 1.5 MG/DL (ref 0–1.2)
BILIRUB SERPL-MCNC: 1.6 MG/DL (ref 0–1.2)
BILIRUB SERPL-MCNC: 2.4 MG/DL (ref 0–1.2)
BUN SERPL-MCNC: 12 MG/DL (ref 8–23)
BUN SERPL-MCNC: 14 MG/DL (ref 8–23)
BUN SERPL-MCNC: 14 MG/DL (ref 8–23)
BUN SERPL-MCNC: 16 MG/DL (ref 8–23)
BUN SERPL-MCNC: 17 MG/DL (ref 8–23)
BUN SERPL-MCNC: 20 MG/DL (ref 8–23)
BUN SERPL-MCNC: 22 MG/DL (ref 8–23)
BUN/CREAT SERPL: 16.7 (ref 7–25)
BUN/CREAT SERPL: 16.7 (ref 7–25)
BUN/CREAT SERPL: 18.8 (ref 7–25)
BUN/CREAT SERPL: 21.9 (ref 7–25)
BUN/CREAT SERPL: 22.1 (ref 7–25)
BUN/CREAT SERPL: 25.3 (ref 7–25)
BUN/CREAT SERPL: 26.8 (ref 7–25)
CALCIUM SPEC-SCNC: 8.3 MG/DL (ref 8.2–9.6)
CALCIUM SPEC-SCNC: 8.4 MG/DL (ref 8.2–9.6)
CALCIUM SPEC-SCNC: 8.6 MG/DL (ref 8.2–9.6)
CALCIUM SPEC-SCNC: 8.7 MG/DL (ref 8.2–9.6)
CHLORIDE SERPL-SCNC: 92 MMOL/L (ref 98–107)
CHLORIDE SERPL-SCNC: 92 MMOL/L (ref 98–107)
CHLORIDE SERPL-SCNC: 94 MMOL/L (ref 98–107)
CHLORIDE SERPL-SCNC: 95 MMOL/L (ref 98–107)
CHLORIDE SERPL-SCNC: 96 MMOL/L (ref 98–107)
CHLORIDE SERPL-SCNC: 96 MMOL/L (ref 98–107)
CHLORIDE SERPL-SCNC: 97 MMOL/L (ref 98–107)
CLARITY, POC: CLEAR
CO2 SERPL-SCNC: 22.8 MMOL/L (ref 22–29)
CO2 SERPL-SCNC: 24.6 MMOL/L (ref 22–29)
CO2 SERPL-SCNC: 26.9 MMOL/L (ref 22–29)
CO2 SERPL-SCNC: 27.4 MMOL/L (ref 22–29)
CO2 SERPL-SCNC: 28.5 MMOL/L (ref 22–29)
CO2 SERPL-SCNC: 28.8 MMOL/L (ref 22–29)
CO2 SERPL-SCNC: 29.2 MMOL/L (ref 22–29)
COLOR UR: YELLOW
CREAT SERPL-MCNC: 0.64 MG/DL (ref 0.76–1.27)
CREAT SERPL-MCNC: 0.72 MG/DL (ref 0.76–1.27)
CREAT SERPL-MCNC: 0.77 MG/DL (ref 0.76–1.27)
CREAT SERPL-MCNC: 0.79 MG/DL (ref 0.76–1.27)
CREAT SERPL-MCNC: 0.82 MG/DL (ref 0.76–1.27)
CREAT SERPL-MCNC: 0.84 MG/DL (ref 0.76–1.27)
CREAT SERPL-MCNC: 0.85 MG/DL (ref 0.76–1.27)
D-LACTATE SERPL-SCNC: 1.8 MMOL/L (ref 0.5–2)
DEPRECATED RDW RBC AUTO: 44 FL (ref 37–54)
DEPRECATED RDW RBC AUTO: 45.4 FL (ref 37–54)
DEPRECATED RDW RBC AUTO: 46.2 FL (ref 37–54)
DEPRECATED RDW RBC AUTO: 47.7 FL (ref 37–54)
DEPRECATED RDW RBC AUTO: 49.6 FL (ref 37–54)
EOSINOPHIL # BLD AUTO: 0.04 10*3/MM3 (ref 0–0.4)
EOSINOPHIL # BLD AUTO: 0.08 10*3/MM3 (ref 0–0.4)
EOSINOPHIL # BLD AUTO: 0.18 10*3/MM3 (ref 0–0.4)
EOSINOPHIL # BLD AUTO: 0.24 10*3/MM3 (ref 0–0.4)
EOSINOPHIL NFR BLD AUTO: 0.6 % (ref 0.3–6.2)
EOSINOPHIL NFR BLD AUTO: 1.2 % (ref 0.3–6.2)
EOSINOPHIL NFR BLD AUTO: 3.3 % (ref 0.3–6.2)
EOSINOPHIL NFR BLD AUTO: 5.1 % (ref 0.3–6.2)
ERYTHROCYTE [DISTWIDTH] IN BLOOD BY AUTOMATED COUNT: 13.3 % (ref 12.3–15.4)
ERYTHROCYTE [DISTWIDTH] IN BLOOD BY AUTOMATED COUNT: 13.8 % (ref 12.3–15.4)
ERYTHROCYTE [DISTWIDTH] IN BLOOD BY AUTOMATED COUNT: 14 % (ref 12.3–15.4)
ERYTHROCYTE [DISTWIDTH] IN BLOOD BY AUTOMATED COUNT: 14.2 % (ref 12.3–15.4)
ERYTHROCYTE [DISTWIDTH] IN BLOOD BY AUTOMATED COUNT: 14.6 % (ref 12.3–15.4)
GFR SERPL CREATININE-BSD FRML MDRD: 101 ML/MIN/1.73
GFR SERPL CREATININE-BSD FRML MDRD: 116 ML/MIN/1.73
GFR SERPL CREATININE-BSD FRML MDRD: 84 ML/MIN/1.73
GFR SERPL CREATININE-BSD FRML MDRD: 85 ML/MIN/1.73
GFR SERPL CREATININE-BSD FRML MDRD: 87 ML/MIN/1.73
GFR SERPL CREATININE-BSD FRML MDRD: 91 ML/MIN/1.73
GFR SERPL CREATININE-BSD FRML MDRD: 94 ML/MIN/1.73
GLOBULIN UR ELPH-MCNC: 2.7 GM/DL
GLOBULIN UR ELPH-MCNC: 2.7 GM/DL
GLOBULIN UR ELPH-MCNC: 2.8 GM/DL
GLOBULIN UR ELPH-MCNC: 2.9 GM/DL
GLUCOSE SERPL-MCNC: 120 MG/DL (ref 65–99)
GLUCOSE SERPL-MCNC: 70 MG/DL (ref 65–99)
GLUCOSE SERPL-MCNC: 76 MG/DL (ref 65–99)
GLUCOSE SERPL-MCNC: 79 MG/DL (ref 65–99)
GLUCOSE SERPL-MCNC: 91 MG/DL (ref 65–99)
GLUCOSE SERPL-MCNC: 91 MG/DL (ref 65–99)
GLUCOSE SERPL-MCNC: 94 MG/DL (ref 65–99)
GLUCOSE UR STRIP-MCNC: NEGATIVE MG/DL
HBA1C MFR BLD: 5.4 % (ref 4.8–5.6)
HCT VFR BLD AUTO: 37.1 % (ref 37.5–51)
HCT VFR BLD AUTO: 37.4 % (ref 37.5–51)
HCT VFR BLD AUTO: 38.4 % (ref 37.5–51)
HCT VFR BLD AUTO: 38.4 % (ref 37.5–51)
HCT VFR BLD AUTO: 40.7 % (ref 37.5–51)
HGB BLD-MCNC: 12.5 G/DL (ref 13–17.7)
HGB BLD-MCNC: 12.6 G/DL (ref 13–17.7)
HGB BLD-MCNC: 12.8 G/DL (ref 13–17.7)
HGB BLD-MCNC: 13 G/DL (ref 13–17.7)
HGB BLD-MCNC: 13.4 G/DL (ref 13–17.7)
HOLD SPECIMEN: NORMAL
IMM GRANULOCYTES # BLD AUTO: 0.02 10*3/MM3 (ref 0–0.05)
IMM GRANULOCYTES # BLD AUTO: 0.03 10*3/MM3 (ref 0–0.05)
IMM GRANULOCYTES NFR BLD AUTO: 0.3 % (ref 0–0.5)
IMM GRANULOCYTES NFR BLD AUTO: 0.4 % (ref 0–0.5)
IMM GRANULOCYTES NFR BLD AUTO: 0.4 % (ref 0–0.5)
IMM GRANULOCYTES NFR BLD AUTO: 0.5 % (ref 0–0.5)
KETONES UR QL: NEGATIVE
LEFT ATRIUM VOLUME INDEX: 30.3 ML/M^2
LEFT ATRIUM VOLUME: 51.6 ML
LEUKOCYTE EST, POC: NEGATIVE
LYMPHOCYTES # BLD AUTO: 0.67 10*3/MM3 (ref 0.7–3.1)
LYMPHOCYTES # BLD AUTO: 0.82 10*3/MM3 (ref 0.7–3.1)
LYMPHOCYTES # BLD AUTO: 0.85 10*3/MM3 (ref 0.7–3.1)
LYMPHOCYTES # BLD AUTO: 1.19 10*3/MM3 (ref 0.7–3.1)
LYMPHOCYTES NFR BLD AUTO: 10.1 % (ref 19.6–45.3)
LYMPHOCYTES NFR BLD AUTO: 12.9 % (ref 19.6–45.3)
LYMPHOCYTES NFR BLD AUTO: 15.5 % (ref 19.6–45.3)
LYMPHOCYTES NFR BLD AUTO: 25.1 % (ref 19.6–45.3)
MAGNESIUM SERPL-MCNC: 1.8 MG/DL (ref 1.7–2.3)
MAGNESIUM SERPL-MCNC: 1.9 MG/DL (ref 1.7–2.3)
MAGNESIUM SERPL-MCNC: 2.3 MG/DL (ref 1.7–2.3)
MAXIMAL PREDICTED HEART RATE: 125 BPM
MCH RBC QN AUTO: 29.5 PG (ref 26.6–33)
MCH RBC QN AUTO: 30.1 PG (ref 26.6–33)
MCH RBC QN AUTO: 30.2 PG (ref 26.6–33)
MCH RBC QN AUTO: 31.1 PG (ref 26.6–33)
MCH RBC QN AUTO: 31.2 PG (ref 26.6–33)
MCHC RBC AUTO-ENTMCNC: 32.9 G/DL (ref 31.5–35.7)
MCHC RBC AUTO-ENTMCNC: 33.3 G/DL (ref 31.5–35.7)
MCHC RBC AUTO-ENTMCNC: 33.7 G/DL (ref 31.5–35.7)
MCHC RBC AUTO-ENTMCNC: 33.7 G/DL (ref 31.5–35.7)
MCHC RBC AUTO-ENTMCNC: 33.9 G/DL (ref 31.5–35.7)
MCV RBC AUTO: 87.1 FL (ref 79–97)
MCV RBC AUTO: 89.5 FL (ref 79–97)
MCV RBC AUTO: 91.9 FL (ref 79–97)
MCV RBC AUTO: 92.5 FL (ref 79–97)
MCV RBC AUTO: 93.4 FL (ref 79–97)
MONOCYTES # BLD AUTO: 0.44 10*3/MM3 (ref 0.1–0.9)
MONOCYTES # BLD AUTO: 0.45 10*3/MM3 (ref 0.1–0.9)
MONOCYTES # BLD AUTO: 0.47 10*3/MM3 (ref 0.1–0.9)
MONOCYTES # BLD AUTO: 0.47 10*3/MM3 (ref 0.1–0.9)
MONOCYTES NFR BLD AUTO: 6.6 % (ref 5–12)
MONOCYTES NFR BLD AUTO: 7.1 % (ref 5–12)
MONOCYTES NFR BLD AUTO: 8.6 % (ref 5–12)
MONOCYTES NFR BLD AUTO: 9.9 % (ref 5–12)
NEUTROPHILS NFR BLD AUTO: 2.78 10*3/MM3 (ref 1.7–7)
NEUTROPHILS NFR BLD AUTO: 3.92 10*3/MM3 (ref 1.7–7)
NEUTROPHILS NFR BLD AUTO: 4.96 10*3/MM3 (ref 1.7–7)
NEUTROPHILS NFR BLD AUTO: 5.39 10*3/MM3 (ref 1.7–7)
NEUTROPHILS NFR BLD AUTO: 58.4 % (ref 42.7–76)
NEUTROPHILS NFR BLD AUTO: 71.5 % (ref 42.7–76)
NEUTROPHILS NFR BLD AUTO: 78.3 % (ref 42.7–76)
NEUTROPHILS NFR BLD AUTO: 81.1 % (ref 42.7–76)
NITRITE UR-MCNC: NEGATIVE MG/ML
NRBC BLD AUTO-RTO: 0 /100 WBC (ref 0–0.2)
NT-PROBNP SERPL-MCNC: 2493 PG/ML (ref 0–1800)
PH UR: 6 [PH] (ref 5–8)
PHOSPHATE SERPL-MCNC: 3.2 MG/DL (ref 2.5–4.5)
PLATELET # BLD AUTO: 234 10*3/MM3 (ref 140–450)
PLATELET # BLD AUTO: 241 10*3/MM3 (ref 140–450)
PLATELET # BLD AUTO: 250 10*3/MM3 (ref 140–450)
PLATELET # BLD AUTO: 271 10*3/MM3 (ref 140–450)
PLATELET # BLD AUTO: 362 10*3/MM3 (ref 140–450)
PMV BLD AUTO: 10.1 FL (ref 6–12)
PMV BLD AUTO: 10.6 FL (ref 6–12)
PMV BLD AUTO: 10.9 FL (ref 6–12)
PMV BLD AUTO: 11 FL (ref 6–12)
PMV BLD AUTO: 9.7 FL (ref 6–12)
POTASSIUM SERPL-SCNC: 3.5 MMOL/L (ref 3.5–5.2)
POTASSIUM SERPL-SCNC: 3.6 MMOL/L (ref 3.5–5.2)
POTASSIUM SERPL-SCNC: 3.9 MMOL/L (ref 3.5–5.2)
POTASSIUM SERPL-SCNC: 3.9 MMOL/L (ref 3.5–5.2)
POTASSIUM SERPL-SCNC: 4.2 MMOL/L (ref 3.5–5.2)
POTASSIUM SERPL-SCNC: 4.4 MMOL/L (ref 3.5–5.2)
POTASSIUM SERPL-SCNC: 4.9 MMOL/L (ref 3.5–5.2)
PROCALCITONIN SERPL-MCNC: 0.07 NG/ML (ref 0–0.25)
PROT SERPL-MCNC: 5.4 G/DL (ref 6–8.5)
PROT SERPL-MCNC: 6.2 G/DL (ref 6–8.5)
PROT SERPL-MCNC: 6.3 G/DL (ref 6–8.5)
PROT SERPL-MCNC: 6.6 G/DL (ref 6–8.5)
PROT UR STRIP-MCNC: NEGATIVE MG/DL
QT INTERVAL: 474 MS
QT INTERVAL: 518 MS
QTC INTERVAL: 562 MS
QTC INTERVAL: 567 MS
RBC # BLD AUTO: 4.01 10*6/MM3 (ref 4.14–5.8)
RBC # BLD AUTO: 4.11 10*6/MM3 (ref 4.14–5.8)
RBC # BLD AUTO: 4.18 10*6/MM3 (ref 4.14–5.8)
RBC # BLD AUTO: 4.41 10*6/MM3 (ref 4.14–5.8)
RBC # BLD AUTO: 4.43 10*6/MM3 (ref 4.14–5.8)
RBC # UR STRIP: NEGATIVE /UL
SODIUM SERPL-SCNC: 125 MMOL/L (ref 136–145)
SODIUM SERPL-SCNC: 126 MMOL/L (ref 136–145)
SODIUM SERPL-SCNC: 130 MMOL/L (ref 136–145)
SODIUM SERPL-SCNC: 130 MMOL/L (ref 136–145)
SODIUM SERPL-SCNC: 132 MMOL/L (ref 136–145)
SP GR UR: 1.02 (ref 1–1.03)
STRESS TARGET HR: 106 BPM
TROPONIN T SERPL-MCNC: 0.02 NG/ML (ref 0–0.03)
TROPONIN T SERPL-MCNC: 0.02 NG/ML (ref 0–0.03)
TROPONIN T SERPL-MCNC: 0.04 NG/ML (ref 0–0.03)
UROBILINOGEN UR QL: NORMAL
WBC # BLD AUTO: 4.75 10*3/MM3 (ref 3.4–10.8)
WBC # BLD AUTO: 5.48 10*3/MM3 (ref 3.4–10.8)
WBC # BLD AUTO: 6.34 10*3/MM3 (ref 3.4–10.8)
WBC # BLD AUTO: 6.64 10*3/MM3 (ref 3.4–10.8)
WBC # BLD AUTO: 6.64 10*3/MM3 (ref 3.4–10.8)
WHOLE BLOOD HOLD SPECIMEN: NORMAL
WHOLE BLOOD HOLD SPECIMEN: NORMAL

## 2021-01-01 PROCEDURE — 93306 TTE W/DOPPLER COMPLETE: CPT

## 2021-01-01 PROCEDURE — 99211 OFF/OP EST MAY X REQ PHY/QHP: CPT

## 2021-01-01 PROCEDURE — G0439 PPPS, SUBSEQ VISIT: HCPCS | Performed by: INTERNAL MEDICINE

## 2021-01-01 PROCEDURE — 99203 OFFICE O/P NEW LOW 30 MIN: CPT | Performed by: SURGERY

## 2021-01-01 PROCEDURE — 99305 1ST NF CARE MODERATE MDM 35: CPT | Performed by: INTERNAL MEDICINE

## 2021-01-01 PROCEDURE — 97110 THERAPEUTIC EXERCISES: CPT

## 2021-01-01 PROCEDURE — 87040 BLOOD CULTURE FOR BACTERIA: CPT | Performed by: EMERGENCY MEDICINE

## 2021-01-01 PROCEDURE — 93005 ELECTROCARDIOGRAM TRACING: CPT | Performed by: INTERNAL MEDICINE

## 2021-01-01 PROCEDURE — 94799 UNLISTED PULMONARY SVC/PX: CPT

## 2021-01-01 PROCEDURE — 96374 THER/PROPH/DIAG INJ IV PUSH: CPT

## 2021-01-01 PROCEDURE — 85025 COMPLETE CBC W/AUTO DIFF WBC: CPT | Performed by: INTERNAL MEDICINE

## 2021-01-01 PROCEDURE — 99284 EMERGENCY DEPT VISIT MOD MDM: CPT

## 2021-01-01 PROCEDURE — 71045 X-RAY EXAM CHEST 1 VIEW: CPT

## 2021-01-01 PROCEDURE — 25010000002 IOPAMIDOL 61 % SOLUTION: Performed by: EMERGENCY MEDICINE

## 2021-01-01 PROCEDURE — 93296 REM INTERROG EVL PM/IDS: CPT | Performed by: INTERNAL MEDICINE

## 2021-01-01 PROCEDURE — 97161 PT EVAL LOW COMPLEX 20 MIN: CPT

## 2021-01-01 PROCEDURE — G0180 MD CERTIFICATION HHA PATIENT: HCPCS | Performed by: INTERNAL MEDICINE

## 2021-01-01 PROCEDURE — 0002A: CPT | Performed by: INTERNAL MEDICINE

## 2021-01-01 PROCEDURE — 83880 ASSAY OF NATRIURETIC PEPTIDE: CPT | Performed by: EMERGENCY MEDICINE

## 2021-01-01 PROCEDURE — 83735 ASSAY OF MAGNESIUM: CPT | Performed by: PHYSICIAN ASSISTANT

## 2021-01-01 PROCEDURE — 99213 OFFICE O/P EST LOW 20 MIN: CPT | Performed by: INTERNAL MEDICINE

## 2021-01-01 PROCEDURE — 99309 SBSQ NF CARE MODERATE MDM 30: CPT | Performed by: PHYSICIAN ASSISTANT

## 2021-01-01 PROCEDURE — 99498 ADVNCD CARE PLAN ADDL 30 MIN: CPT | Performed by: INTERNAL MEDICINE

## 2021-01-01 PROCEDURE — 73502 X-RAY EXAM HIP UNI 2-3 VIEWS: CPT

## 2021-01-01 PROCEDURE — 99213 OFFICE O/P EST LOW 20 MIN: CPT | Performed by: PHYSICIAN ASSISTANT

## 2021-01-01 PROCEDURE — 25010000002 ENOXAPARIN PER 10 MG: Performed by: FAMILY MEDICINE

## 2021-01-01 PROCEDURE — 25010000002 ZIPRASIDONE MESYLATE PER 10 MG: Performed by: FAMILY MEDICINE

## 2021-01-01 PROCEDURE — 25010000002 FENTANYL CITRATE (PF) 100 MCG/2ML SOLUTION: Performed by: EMERGENCY MEDICINE

## 2021-01-01 PROCEDURE — 99283 EMERGENCY DEPT VISIT LOW MDM: CPT

## 2021-01-01 PROCEDURE — 99214 OFFICE O/P EST MOD 30 MIN: CPT | Performed by: INTERNAL MEDICINE

## 2021-01-01 PROCEDURE — 83036 HEMOGLOBIN GLYCOSYLATED A1C: CPT | Performed by: INTERNAL MEDICINE

## 2021-01-01 PROCEDURE — 99232 SBSQ HOSP IP/OBS MODERATE 35: CPT | Performed by: FAMILY MEDICINE

## 2021-01-01 PROCEDURE — 72170 X-RAY EXAM OF PELVIS: CPT

## 2021-01-01 PROCEDURE — 80048 BASIC METABOLIC PNL TOTAL CA: CPT | Performed by: FAMILY MEDICINE

## 2021-01-01 PROCEDURE — 83605 ASSAY OF LACTIC ACID: CPT | Performed by: EMERGENCY MEDICINE

## 2021-01-01 PROCEDURE — 99212 OFFICE O/P EST SF 10 MIN: CPT | Performed by: PHYSICIAN ASSISTANT

## 2021-01-01 PROCEDURE — 70450 CT HEAD/BRAIN W/O DYE: CPT

## 2021-01-01 PROCEDURE — 85027 COMPLETE CBC AUTOMATED: CPT | Performed by: INTERNAL MEDICINE

## 2021-01-01 PROCEDURE — 99397 PER PM REEVAL EST PAT 65+ YR: CPT | Performed by: INTERNAL MEDICINE

## 2021-01-01 PROCEDURE — 25010000002 ONDANSETRON PER 1 MG: Performed by: EMERGENCY MEDICINE

## 2021-01-01 PROCEDURE — 91300 HC SARSCOV02 VAC 30MCG/0.3ML IM: CPT | Performed by: INTERNAL MEDICINE

## 2021-01-01 PROCEDURE — 25010000002 FUROSEMIDE PER 20 MG: Performed by: FAMILY MEDICINE

## 2021-01-01 PROCEDURE — 84484 ASSAY OF TROPONIN QUANT: CPT | Performed by: INTERNAL MEDICINE

## 2021-01-01 PROCEDURE — 96375 TX/PRO/DX INJ NEW DRUG ADDON: CPT

## 2021-01-01 PROCEDURE — 0001A: CPT | Performed by: INTERNAL MEDICINE

## 2021-01-01 PROCEDURE — 84145 PROCALCITONIN (PCT): CPT | Performed by: EMERGENCY MEDICINE

## 2021-01-01 PROCEDURE — 99214 OFFICE O/P EST MOD 30 MIN: CPT | Performed by: UROLOGY

## 2021-01-01 PROCEDURE — 72125 CT NECK SPINE W/O DYE: CPT

## 2021-01-01 PROCEDURE — 84484 ASSAY OF TROPONIN QUANT: CPT | Performed by: EMERGENCY MEDICINE

## 2021-01-01 PROCEDURE — 85025 COMPLETE CBC W/AUTO DIFF WBC: CPT | Performed by: PHYSICIAN ASSISTANT

## 2021-01-01 PROCEDURE — 97530 THERAPEUTIC ACTIVITIES: CPT

## 2021-01-01 PROCEDURE — 85025 COMPLETE CBC W/AUTO DIFF WBC: CPT | Performed by: EMERGENCY MEDICINE

## 2021-01-01 PROCEDURE — 96361 HYDRATE IV INFUSION ADD-ON: CPT

## 2021-01-01 PROCEDURE — 93005 ELECTROCARDIOGRAM TRACING: CPT | Performed by: EMERGENCY MEDICINE

## 2021-01-01 PROCEDURE — 25010000002 FUROSEMIDE PER 20 MG: Performed by: INTERNAL MEDICINE

## 2021-01-01 PROCEDURE — 99213 OFFICE O/P EST LOW 20 MIN: CPT | Performed by: NURSE PRACTITIONER

## 2021-01-01 PROCEDURE — 1159F MED LIST DOCD IN RCRD: CPT | Performed by: INTERNAL MEDICINE

## 2021-01-01 PROCEDURE — 94640 AIRWAY INHALATION TREATMENT: CPT

## 2021-01-01 PROCEDURE — 80053 COMPREHEN METABOLIC PANEL: CPT | Performed by: EMERGENCY MEDICINE

## 2021-01-01 PROCEDURE — 83735 ASSAY OF MAGNESIUM: CPT | Performed by: EMERGENCY MEDICINE

## 2021-01-01 PROCEDURE — 80048 BASIC METABOLIC PNL TOTAL CA: CPT | Performed by: INTERNAL MEDICINE

## 2021-01-01 PROCEDURE — 80053 COMPREHEN METABOLIC PANEL: CPT | Performed by: PHYSICIAN ASSISTANT

## 2021-01-01 PROCEDURE — 73700 CT LOWER EXTREMITY W/O DYE: CPT

## 2021-01-01 PROCEDURE — 93010 ELECTROCARDIOGRAM REPORT: CPT | Performed by: INTERNAL MEDICINE

## 2021-01-01 PROCEDURE — 93294 REM INTERROG EVL PM/LDLS PM: CPT | Performed by: INTERNAL MEDICINE

## 2021-01-01 PROCEDURE — 97166 OT EVAL MOD COMPLEX 45 MIN: CPT

## 2021-01-01 PROCEDURE — 99222 1ST HOSP IP/OBS MODERATE 55: CPT | Performed by: INTERNAL MEDICINE

## 2021-01-01 PROCEDURE — 99308 SBSQ NF CARE LOW MDM 20: CPT | Performed by: PHYSICIAN ASSISTANT

## 2021-01-01 PROCEDURE — 83735 ASSAY OF MAGNESIUM: CPT | Performed by: INTERNAL MEDICINE

## 2021-01-01 PROCEDURE — 99203 OFFICE O/P NEW LOW 30 MIN: CPT | Performed by: PHYSICIAN ASSISTANT

## 2021-01-01 PROCEDURE — 99315 NF DSCHRG MGMT 30 MIN/LESS: CPT | Performed by: PHYSICIAN ASSISTANT

## 2021-01-01 PROCEDURE — 99232 SBSQ HOSP IP/OBS MODERATE 35: CPT | Performed by: INTERNAL MEDICINE

## 2021-01-01 PROCEDURE — 99309 SBSQ NF CARE MODERATE MDM 30: CPT | Performed by: INTERNAL MEDICINE

## 2021-01-01 PROCEDURE — 99238 HOSP IP/OBS DSCHRG MGMT 30/<: CPT | Performed by: INTERNAL MEDICINE

## 2021-01-01 PROCEDURE — 93306 TTE W/DOPPLER COMPLETE: CPT | Performed by: INTERNAL MEDICINE

## 2021-01-01 PROCEDURE — 80053 COMPREHEN METABOLIC PANEL: CPT | Performed by: FAMILY MEDICINE

## 2021-01-01 PROCEDURE — 80069 RENAL FUNCTION PANEL: CPT | Performed by: INTERNAL MEDICINE

## 2021-01-01 PROCEDURE — 74018 RADEX ABDOMEN 1 VIEW: CPT

## 2021-01-01 PROCEDURE — 74177 CT ABD & PELVIS W/CONTRAST: CPT

## 2021-01-01 PROCEDURE — 1170F FXNL STATUS ASSESSED: CPT | Performed by: INTERNAL MEDICINE

## 2021-01-01 PROCEDURE — 81003 URINALYSIS AUTO W/O SCOPE: CPT | Performed by: UROLOGY

## 2021-01-01 PROCEDURE — 99497 ADVNCD CARE PLAN 30 MIN: CPT | Performed by: INTERNAL MEDICINE

## 2021-01-01 RX ORDER — FUROSEMIDE 10 MG/ML
40 INJECTION INTRAMUSCULAR; INTRAVENOUS EVERY 12 HOURS
Status: DISCONTINUED | OUTPATIENT
Start: 2021-01-01 | End: 2021-01-01

## 2021-01-01 RX ORDER — FUROSEMIDE 40 MG/1
40 TABLET ORAL DAILY
Status: DISCONTINUED | OUTPATIENT
Start: 2021-01-01 | End: 2021-01-01 | Stop reason: HOSPADM

## 2021-01-01 RX ORDER — ATORVASTATIN CALCIUM 20 MG/1
20 TABLET, FILM COATED ORAL DAILY
Status: DISCONTINUED | OUTPATIENT
Start: 2021-01-01 | End: 2021-01-01 | Stop reason: HOSPADM

## 2021-01-01 RX ORDER — IPRATROPIUM BROMIDE AND ALBUTEROL SULFATE 2.5; .5 MG/3ML; MG/3ML
3 SOLUTION RESPIRATORY (INHALATION) EVERY 4 HOURS PRN
Status: DISCONTINUED | OUTPATIENT
Start: 2021-01-01 | End: 2021-01-01 | Stop reason: HOSPADM

## 2021-01-01 RX ORDER — QUETIAPINE FUMARATE 25 MG/1
25 TABLET, FILM COATED ORAL NIGHTLY
Start: 2021-01-01 | End: 2021-09-15

## 2021-01-01 RX ORDER — IPRATROPIUM BROMIDE AND ALBUTEROL SULFATE 2.5; .5 MG/3ML; MG/3ML
3 SOLUTION RESPIRATORY (INHALATION) EVERY 4 HOURS PRN
Qty: 360 ML
Start: 2021-01-01 | End: 2021-09-15

## 2021-01-01 RX ORDER — IPRATROPIUM BROMIDE AND ALBUTEROL SULFATE 2.5; .5 MG/3ML; MG/3ML
3 SOLUTION RESPIRATORY (INHALATION)
Qty: 360 ML
Start: 2021-01-01 | End: 2021-01-01 | Stop reason: SDUPTHER

## 2021-01-01 RX ORDER — ACETAMINOPHEN 325 MG/1
650 TABLET ORAL EVERY 6 HOURS PRN
Status: DISCONTINUED | OUTPATIENT
Start: 2021-01-01 | End: 2021-01-01 | Stop reason: HOSPADM

## 2021-01-01 RX ORDER — QUETIAPINE FUMARATE 25 MG/1
25 TABLET, FILM COATED ORAL NIGHTLY
Status: DISCONTINUED | OUTPATIENT
Start: 2021-01-01 | End: 2021-01-01 | Stop reason: HOSPADM

## 2021-01-01 RX ORDER — ONDANSETRON 2 MG/ML
4 INJECTION INTRAMUSCULAR; INTRAVENOUS ONCE
Status: COMPLETED | OUTPATIENT
Start: 2021-01-01 | End: 2021-01-01

## 2021-01-01 RX ORDER — MIRTAZAPINE 7.5 MG/1
TABLET, FILM COATED ORAL
COMMUNITY
Start: 2021-01-01 | End: 2021-09-15

## 2021-01-01 RX ORDER — AMINO ACIDS/PROTEIN HYDROLYS 15G-100/30
30 LIQUID (ML) ORAL 2 TIMES DAILY
Start: 2021-01-01 | End: 2021-09-15

## 2021-01-01 RX ORDER — CLINDAMYCIN HYDROCHLORIDE 150 MG/1
150 CAPSULE ORAL 2 TIMES DAILY WITH MEALS
Qty: 14 CAPSULE | Refills: 0 | Status: SHIPPED | OUTPATIENT
Start: 2021-01-01 | End: 2021-01-01

## 2021-01-01 RX ORDER — IPRATROPIUM BROMIDE AND ALBUTEROL SULFATE 2.5; .5 MG/3ML; MG/3ML
3 SOLUTION RESPIRATORY (INHALATION)
Status: DISCONTINUED | OUTPATIENT
Start: 2021-01-01 | End: 2021-01-01

## 2021-01-01 RX ORDER — ESCITALOPRAM OXALATE 10 MG/1
10 TABLET ORAL DAILY
Status: DISCONTINUED | OUTPATIENT
Start: 2021-01-01 | End: 2021-01-01 | Stop reason: HOSPADM

## 2021-01-01 RX ORDER — TRIAMCINOLONE ACETONIDE 1 MG/G
CREAM TOPICAL
COMMUNITY
Start: 2021-01-01 | End: 2021-09-15

## 2021-01-01 RX ORDER — CLONAZEPAM 0.5 MG/1
1 TABLET ORAL NIGHTLY
Status: DISCONTINUED | OUTPATIENT
Start: 2021-01-01 | End: 2021-01-01

## 2021-01-01 RX ORDER — CANDESARTAN 4 MG/1
TABLET ORAL
Qty: 90 TABLET | Refills: 3 | Status: SHIPPED | OUTPATIENT
Start: 2021-01-01 | End: 2021-01-01 | Stop reason: HOSPADM

## 2021-01-01 RX ORDER — FUROSEMIDE 20 MG/1
TABLET ORAL
Qty: 30 TABLET | Refills: 0
Start: 2021-01-01 | End: 2021-01-01 | Stop reason: HOSPADM

## 2021-01-01 RX ORDER — ATORVASTATIN CALCIUM 20 MG/1
TABLET, FILM COATED ORAL
Qty: 90 TABLET | Refills: 3 | Status: SHIPPED | OUTPATIENT
Start: 2021-01-01 | End: 2021-01-01 | Stop reason: ALTCHOICE

## 2021-01-01 RX ORDER — SODIUM CHLORIDE 0.9 % (FLUSH) 0.9 %
10 SYRINGE (ML) INJECTION AS NEEDED
Status: DISCONTINUED | OUTPATIENT
Start: 2021-01-01 | End: 2021-01-01 | Stop reason: HOSPADM

## 2021-01-01 RX ORDER — HYDROCORTISONE ACETATE 25 MG/1
25 SUPPOSITORY RECTAL 2 TIMES DAILY
Qty: 14 SUPPOSITORY | Refills: 1 | Status: SHIPPED | OUTPATIENT
Start: 2021-01-01 | End: 2021-01-01 | Stop reason: ALTCHOICE

## 2021-01-01 RX ORDER — FUROSEMIDE 10 MG/ML
40 INJECTION INTRAMUSCULAR; INTRAVENOUS DAILY
Status: DISCONTINUED | OUTPATIENT
Start: 2021-01-01 | End: 2021-01-01

## 2021-01-01 RX ORDER — HYDROCODONE BITARTRATE AND ACETAMINOPHEN 5; 325 MG/1; MG/1
.5-1 TABLET ORAL EVERY 6 HOURS PRN
Qty: 10 TABLET | Refills: 0 | Status: SHIPPED | OUTPATIENT
Start: 2021-01-01 | End: 2021-01-01

## 2021-01-01 RX ORDER — NITROGLYCERIN 0.4 MG/1
0.4 TABLET SUBLINGUAL
Status: DISCONTINUED | OUTPATIENT
Start: 2021-01-01 | End: 2021-01-01 | Stop reason: HOSPADM

## 2021-01-01 RX ORDER — POLYMYXIN B SULFATE AND TRIMETHOPRIM 1; 10000 MG/ML; [USP'U]/ML
SOLUTION OPHTHALMIC
COMMUNITY
Start: 2021-01-01 | End: 2021-09-15

## 2021-01-01 RX ORDER — OXYBUTYNIN CHLORIDE 5 MG/1
5 TABLET, EXTENDED RELEASE ORAL DAILY
Status: DISCONTINUED | OUTPATIENT
Start: 2021-01-01 | End: 2021-01-01 | Stop reason: HOSPADM

## 2021-01-01 RX ORDER — FENTANYL CITRATE 50 UG/ML
50 INJECTION, SOLUTION INTRAMUSCULAR; INTRAVENOUS ONCE
Status: COMPLETED | OUTPATIENT
Start: 2021-01-01 | End: 2021-01-01

## 2021-01-01 RX ORDER — SODIUM CHLORIDE 0.9 % (FLUSH) 0.9 %
10 SYRINGE (ML) INJECTION EVERY 12 HOURS SCHEDULED
Status: DISCONTINUED | OUTPATIENT
Start: 2021-01-01 | End: 2021-01-01

## 2021-01-01 RX ORDER — AMINO ACIDS/PROTEIN HYDROLYS 15G-100/30
30 LIQUID (ML) ORAL 2 TIMES DAILY
Status: DISCONTINUED | OUTPATIENT
Start: 2021-01-01 | End: 2021-01-01 | Stop reason: HOSPADM

## 2021-01-01 RX ORDER — SODIUM CHLORIDE 0.9 % (FLUSH) 0.9 %
10 SYRINGE (ML) INJECTION AS NEEDED
Status: DISCONTINUED | OUTPATIENT
Start: 2021-01-01 | End: 2021-01-01

## 2021-01-01 RX ORDER — AMOXICILLIN 250 MG/1
CAPSULE ORAL
COMMUNITY
Start: 2021-01-01 | End: 2021-09-15

## 2021-01-01 RX ORDER — FUROSEMIDE 40 MG/1
40 TABLET ORAL DAILY
Start: 2021-01-01 | End: 2021-09-15

## 2021-01-01 RX ORDER — CARVEDILOL 12.5 MG/1
12.5 TABLET ORAL 2 TIMES DAILY WITH MEALS
Status: DISCONTINUED | OUTPATIENT
Start: 2021-01-01 | End: 2021-01-01 | Stop reason: HOSPADM

## 2021-01-01 RX ORDER — IPRATROPIUM BROMIDE AND ALBUTEROL SULFATE 2.5; .5 MG/3ML; MG/3ML
3 SOLUTION RESPIRATORY (INHALATION) ONCE
Status: COMPLETED | OUTPATIENT
Start: 2021-01-01 | End: 2021-01-01

## 2021-01-01 RX ORDER — ASPIRIN 81 MG/1
81 TABLET ORAL DAILY
Status: DISCONTINUED | OUTPATIENT
Start: 2021-01-01 | End: 2021-01-01 | Stop reason: HOSPADM

## 2021-01-01 RX ORDER — HYDROCORTISONE 25 MG/G
CREAM TOPICAL 2 TIMES DAILY
Qty: 28 G | Refills: 2 | Status: SHIPPED | OUTPATIENT
Start: 2021-01-01 | End: 2021-01-01 | Stop reason: HOSPADM

## 2021-01-01 RX ORDER — HALOPERIDOL 5 MG/ML
INJECTION INTRAMUSCULAR
COMMUNITY
Start: 2021-01-01 | End: 2021-09-15

## 2021-01-01 RX ORDER — ZIPRASIDONE MESYLATE 20 MG/ML
20 INJECTION, POWDER, LYOPHILIZED, FOR SOLUTION INTRAMUSCULAR EVERY 4 HOURS PRN
Status: DISCONTINUED | OUTPATIENT
Start: 2021-01-01 | End: 2021-01-01

## 2021-01-01 RX ORDER — TRIAMCINOLONE ACETONIDE 55 UG/1
2 SPRAY, METERED NASAL DAILY
Qty: 16.5 G | Refills: 11 | Status: SHIPPED | OUTPATIENT
Start: 2021-01-01 | End: 2021-09-15

## 2021-01-01 RX ORDER — LABETALOL HYDROCHLORIDE 5 MG/ML
10 INJECTION, SOLUTION INTRAVENOUS EVERY 6 HOURS PRN
Status: DISCONTINUED | OUTPATIENT
Start: 2021-01-01 | End: 2021-01-01 | Stop reason: HOSPADM

## 2021-01-01 RX ORDER — DOXYCYCLINE HYCLATE 100 MG/1
100 TABLET, DELAYED RELEASE ORAL 2 TIMES DAILY
Qty: 14 TABLET | Refills: 0 | Status: SHIPPED | OUTPATIENT
Start: 2021-01-01 | End: 2021-01-01 | Stop reason: HOSPADM

## 2021-01-01 RX ORDER — IPRATROPIUM BROMIDE AND ALBUTEROL SULFATE 2.5; .5 MG/3ML; MG/3ML
3 SOLUTION RESPIRATORY (INHALATION)
Status: DISCONTINUED | OUTPATIENT
Start: 2021-01-01 | End: 2021-01-01 | Stop reason: HOSPADM

## 2021-01-01 RX ORDER — DOXYCYCLINE HYCLATE 100 MG/1
CAPSULE ORAL
COMMUNITY
Start: 2021-01-01 | End: 2021-09-15

## 2021-01-01 RX ORDER — ONDANSETRON 2 MG/ML
4 INJECTION INTRAMUSCULAR; INTRAVENOUS EVERY 6 HOURS PRN
Status: DISCONTINUED | OUTPATIENT
Start: 2021-01-01 | End: 2021-01-01 | Stop reason: HOSPADM

## 2021-01-01 RX ADMIN — IPRATROPIUM BROMIDE AND ALBUTEROL SULFATE 3 ML: .5; 3 SOLUTION RESPIRATORY (INHALATION) at 06:46

## 2021-01-01 RX ADMIN — FENTANYL CITRATE 50 MCG: 50 INJECTION, SOLUTION INTRAMUSCULAR; INTRAVENOUS at 16:08

## 2021-01-01 RX ADMIN — IPRATROPIUM BROMIDE AND ALBUTEROL SULFATE 3 ML: .5; 3 SOLUTION RESPIRATORY (INHALATION) at 00:19

## 2021-01-01 RX ADMIN — IPRATROPIUM BROMIDE AND ALBUTEROL SULFATE 3 ML: .5; 3 SOLUTION RESPIRATORY (INHALATION) at 06:58

## 2021-01-01 RX ADMIN — FUROSEMIDE 40 MG: 10 INJECTION, SOLUTION INTRAVENOUS at 23:24

## 2021-01-01 RX ADMIN — IPRATROPIUM BROMIDE AND ALBUTEROL SULFATE 3 ML: .5; 3 SOLUTION RESPIRATORY (INHALATION) at 15:43

## 2021-01-01 RX ADMIN — IPRATROPIUM BROMIDE AND ALBUTEROL SULFATE 3 ML: .5; 3 SOLUTION RESPIRATORY (INHALATION) at 18:54

## 2021-01-01 RX ADMIN — IPRATROPIUM BROMIDE AND ALBUTEROL SULFATE 3 ML: .5; 3 SOLUTION RESPIRATORY (INHALATION) at 12:37

## 2021-01-01 RX ADMIN — OXYBUTYNIN CHLORIDE 5 MG: 5 TABLET, EXTENDED RELEASE ORAL at 09:34

## 2021-01-01 RX ADMIN — SODIUM CHLORIDE, PRESERVATIVE FREE 10 ML: 5 INJECTION INTRAVENOUS at 20:45

## 2021-01-01 RX ADMIN — IPRATROPIUM BROMIDE AND ALBUTEROL SULFATE 3 ML: .5; 3 SOLUTION RESPIRATORY (INHALATION) at 13:16

## 2021-01-01 RX ADMIN — CLONAZEPAM 1 MG: 0.5 TABLET ORAL at 21:34

## 2021-01-01 RX ADMIN — IPRATROPIUM BROMIDE AND ALBUTEROL SULFATE 3 ML: .5; 3 SOLUTION RESPIRATORY (INHALATION) at 06:56

## 2021-01-01 RX ADMIN — Medication 30 ML: at 21:34

## 2021-01-01 RX ADMIN — ATORVASTATIN CALCIUM 20 MG: 20 TABLET, FILM COATED ORAL at 09:34

## 2021-01-01 RX ADMIN — SODIUM CHLORIDE, PRESERVATIVE FREE 10 ML: 5 INJECTION INTRAVENOUS at 10:00

## 2021-01-01 RX ADMIN — ESCITALOPRAM OXALATE 10 MG: 10 TABLET ORAL at 10:27

## 2021-01-01 RX ADMIN — FUROSEMIDE 40 MG: 10 INJECTION, SOLUTION INTRAVENOUS at 12:21

## 2021-01-01 RX ADMIN — ESCITALOPRAM OXALATE 10 MG: 10 TABLET ORAL at 09:34

## 2021-01-01 RX ADMIN — CARVEDILOL 12.5 MG: 12.5 TABLET, FILM COATED ORAL at 09:34

## 2021-01-01 RX ADMIN — IPRATROPIUM BROMIDE AND ALBUTEROL SULFATE 3 ML: .5; 3 SOLUTION RESPIRATORY (INHALATION) at 19:11

## 2021-01-01 RX ADMIN — IOPAMIDOL 100 ML: 612 INJECTION, SOLUTION INTRAVENOUS at 14:38

## 2021-01-01 RX ADMIN — SODIUM CHLORIDE, PRESERVATIVE FREE 10 ML: 5 INJECTION INTRAVENOUS at 21:08

## 2021-01-01 RX ADMIN — IPRATROPIUM BROMIDE AND ALBUTEROL SULFATE 3 ML: .5; 3 SOLUTION RESPIRATORY (INHALATION) at 15:33

## 2021-01-01 RX ADMIN — CARVEDILOL 12.5 MG: 12.5 TABLET, FILM COATED ORAL at 18:06

## 2021-01-01 RX ADMIN — SODIUM CHLORIDE, PRESERVATIVE FREE 10 ML: 5 INJECTION INTRAVENOUS at 23:24

## 2021-01-01 RX ADMIN — QUETIAPINE FUMARATE 25 MG: 25 TABLET ORAL at 20:58

## 2021-01-01 RX ADMIN — CARVEDILOL 12.5 MG: 12.5 TABLET, FILM COATED ORAL at 10:27

## 2021-01-01 RX ADMIN — IPRATROPIUM BROMIDE AND ALBUTEROL SULFATE 3 ML: .5; 3 SOLUTION RESPIRATORY (INHALATION) at 19:24

## 2021-01-01 RX ADMIN — ASPIRIN 81 MG: 81 TABLET, COATED ORAL at 10:26

## 2021-01-01 RX ADMIN — CARVEDILOL 12.5 MG: 12.5 TABLET, FILM COATED ORAL at 17:29

## 2021-01-01 RX ADMIN — ENOXAPARIN SODIUM 30 MG: 30 INJECTION SUBCUTANEOUS at 17:14

## 2021-01-01 RX ADMIN — FUROSEMIDE 40 MG: 40 TABLET ORAL at 09:34

## 2021-01-01 RX ADMIN — IPRATROPIUM BROMIDE AND ALBUTEROL SULFATE 3 ML: .5; 3 SOLUTION RESPIRATORY (INHALATION) at 06:59

## 2021-01-01 RX ADMIN — CLONAZEPAM 1 MG: 0.5 TABLET ORAL at 23:24

## 2021-01-01 RX ADMIN — ZIPRASIDONE MESYLATE 20 MG: 20 INJECTION, POWDER, LYOPHILIZED, FOR SOLUTION INTRAMUSCULAR at 08:56

## 2021-01-01 RX ADMIN — Medication 30 ML: at 09:34

## 2021-01-01 RX ADMIN — Medication 30 ML: at 10:27

## 2021-01-01 RX ADMIN — IPRATROPIUM BROMIDE AND ALBUTEROL SULFATE 3 ML: .5; 3 SOLUTION RESPIRATORY (INHALATION) at 11:35

## 2021-01-01 RX ADMIN — OXYBUTYNIN CHLORIDE 5 MG: 5 TABLET, EXTENDED RELEASE ORAL at 10:27

## 2021-01-01 RX ADMIN — SODIUM CHLORIDE, PRESERVATIVE FREE 10 ML: 5 INJECTION INTRAVENOUS at 10:28

## 2021-01-01 RX ADMIN — FUROSEMIDE 40 MG: 10 INJECTION, SOLUTION INTRAVENOUS at 00:58

## 2021-01-01 RX ADMIN — ENOXAPARIN SODIUM 30 MG: 30 INJECTION SUBCUTANEOUS at 09:34

## 2021-01-01 RX ADMIN — ONDANSETRON 4 MG: 2 INJECTION INTRAMUSCULAR; INTRAVENOUS at 16:07

## 2021-01-01 RX ADMIN — SODIUM CHLORIDE 500 ML: 9 INJECTION, SOLUTION INTRAVENOUS at 13:40

## 2021-01-01 RX ADMIN — ENOXAPARIN SODIUM 30 MG: 30 INJECTION SUBCUTANEOUS at 12:45

## 2021-01-01 RX ADMIN — IPRATROPIUM BROMIDE AND ALBUTEROL SULFATE 3 ML: .5; 3 SOLUTION RESPIRATORY (INHALATION) at 21:37

## 2021-01-01 RX ADMIN — SODIUM CHLORIDE, PRESERVATIVE FREE 10 ML: 5 INJECTION INTRAVENOUS at 21:07

## 2021-01-01 RX ADMIN — IPRATROPIUM BROMIDE AND ALBUTEROL SULFATE 3 ML: .5; 3 SOLUTION RESPIRATORY (INHALATION) at 00:29

## 2021-01-01 RX ADMIN — SODIUM CHLORIDE, PRESERVATIVE FREE 10 ML: 5 INJECTION INTRAVENOUS at 23:25

## 2021-01-01 RX ADMIN — FENTANYL CITRATE 50 MCG: 50 INJECTION, SOLUTION INTRAMUSCULAR; INTRAVENOUS at 07:01

## 2021-01-01 RX ADMIN — Medication 30 ML: at 20:58

## 2021-01-01 RX ADMIN — FUROSEMIDE 40 MG: 10 INJECTION INTRAMUSCULAR; INTRAVENOUS at 10:38

## 2021-01-01 RX ADMIN — ATORVASTATIN CALCIUM 20 MG: 20 TABLET, FILM COATED ORAL at 10:27

## 2021-01-01 RX ADMIN — ASPIRIN 81 MG: 81 TABLET, COATED ORAL at 09:34

## 2021-01-04 NOTE — TELEPHONE ENCOUNTER
Contacted daughter and informed her that medication had been sent to local pharmacy and provider advised sitting on a heating pad for 20 minutes 3 times daily to help with rectal pain; daughter expressed understanding and stated she would follow up with provider as needed

## 2021-01-04 NOTE — TELEPHONE ENCOUNTER
Please tell daughter that I sent Anusol cream to their pharmacy to apply externally twice daily as needed.  Also sitting on a warm heating pad for 20 minutes 3 times daily is helpful.

## 2021-01-04 NOTE — TELEPHONE ENCOUNTER
"Patient's daughter contacted office stating that patient is having \"issues with hemorrhoids\" for the past couple of days. Patient reports pain but denies any blood in stool and states he is continuing to take Miralax daily to help keep stool soft.  Daughter states he is unable to take OCT Preparation H due to warning on medication stating to consult physician if you have hypertension or thyroid issues.   Please  advise what patient can do to help with pain and I will contact daughter.   "

## 2021-01-08 NOTE — TELEPHONE ENCOUNTER
PATIENT'S DAUGHTER CALLED TO STATED THAT 'S HEMMORROIDS ARE STILL BOTHERING HIM AND SHE HAS BEEN PUTTING THE CREAM ON HIM TWO TO  THREE TIMES A DAY AND HIES STILL IN PAIN. PLEASE GIVE DAUGHTER A CALL BACK ASAP          CALL BACK NUMBER: [phone]

## 2021-01-08 NOTE — TELEPHONE ENCOUNTER
Please asked daughter if she has tried to have him sit on heating pad a few times daily for 20 minutes.  I will send in suppository to apply per rectum twice daily to try also.  They may also try Preparation H twice daily between applying Anusol cream 3 times daily.

## 2021-01-11 NOTE — TELEPHONE ENCOUNTER
Pt's daughter notified, stated they will not be using suppositories as he can not do it on his own and the daughter doesn't want to do it herself.

## 2021-01-12 NOTE — PROGRESS NOTES
Chief Complaint  Benign Prostatic Hypertrophy (yearly)        OFELIA Michele is a 89 y.o. male who returns today for an annual checkup having had a recent hospitalization at [facility] in [place] for insertion of a pacemaker.  His urological concern is primarily wetting the bed at night with no difficulty voiding during the day.  He has been trying to get off of his favorite sodas mainly because of hyponatremia but reduction in caffeine might help his voiding issues as well.  He has basically been emptying the bladder well since a TURP done 30 years ago and despite his advanced age has never shown signs of cancer.  He is currently dealing with painful hemorrhoids that are his chief complaint.    There were no vitals filed for this visit.    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)    • Sleep apnea    • Spermatocele    • Syncope, near    • URTI (acute upper respiratory infection)        Past Surgical History  Past Surgical History:   Procedure Laterality Date   • CARDIAC ELECTROPHYSIOLOGY PROCEDURE N/A 11/24/2020    Procedure: DEVICE IMPLANT;  Surgeon: Julius Chatman DO;  Location: [facility] INVASIVE LOCATION;  Service: Cardiology;  Laterality: N/A;  MICRA   • CHOLECYSTECTOMY  20 years ago   • COLONOSCOPY  15-20 years ago   • COLONOSCOPY W/ POLYPECTOMY      Dr Toledo   • CYSTOSCOPY TRANSURETHRAL RESECTION OF PROSTATE     • PROSTATECTOMY      non cancer, Dr Toledo   • TONSILLECTOMY  1931       Medications  has a current medication list which includes the following prescription(s): acetaminophen, aspirin, atorvastatin, candesartan, carvedilol, clonazepam, escitalopram, furosemide, hydrocortisone (perianal),  levothyroxine, and tolterodine la.      Allergies  Allergies   Allergen Reactions   • Ace Inhibitors    • Amoxicillin-Pot Clavulanate    • Cefaclor    • Hydrochlorothiazide    • Nitrofurantoin        Social History  Social History     Socioeconomic History   • Marital status:      Spouse name: Not on file   • Number of children: Not on file   • Years of education: Not on file   • Highest education level: Not on file   Tobacco Use   • Smoking status: Never Smoker   • Smokeless tobacco: Never Used   Substance and Sexual Activity   • Alcohol use: Yes     Alcohol/week: 3.0 standard drinks     Types: 3 Cans of beer per week   • Drug use: No   • Sexual activity: Defer       Family History  He has no family history of bladder or kidney cancer  He has no family history of kidney stones      AUA Symptom Score:      Review of Systems  Review of Systems   Constitutional: Negative for activity change, appetite change, chills, fatigue, fever, unexpected weight gain and unexpected weight loss.   Respiratory: Negative for apnea, cough, chest tightness, shortness of breath, wheezing and stridor.    Cardiovascular: Negative for chest pain, palpitations and leg swelling.   Gastrointestinal: Positive for constipation and rectal pain. Negative for abdominal distention, abdominal pain, anal bleeding, blood in stool, diarrhea, nausea, vomiting, GERD and indigestion.   Genitourinary: Positive for difficulty urinating. Negative for decreased libido, decreased urine volume, discharge, dysuria, flank pain, frequency, genital sores, hematuria, nocturia, penile pain, erectile dysfunction, penile swelling, scrotal swelling, testicular pain, urgency and urinary incontinence.   Musculoskeletal: Negative for back pain and joint swelling.   Neurological: Negative for tremors, seizures, speech difficulty, weakness and numbness.   Psychiatric/Behavioral: Negative for agitation, decreased concentration, sleep disturbance, depressed mood and  stress. The patient is not nervous/anxious.        Physical Exam  Physical Exam  Vitals signs reviewed.   Constitutional:       Appearance: He is well-developed.   HENT:      Head: Normocephalic and atraumatic.   Neck:      Musculoskeletal: Normal range of motion.   Pulmonary:      Effort: Pulmonary effort is normal. No respiratory distress.   Abdominal:      General: There is no distension.      Palpations: Abdomen is soft. There is no mass.      Tenderness: There is no abdominal tenderness.      Hernia: No hernia is present.   Musculoskeletal: Normal range of motion.   Lymphadenopathy:      Cervical: No cervical adenopathy.   Skin:     General: Skin is warm and dry.   Neurological:      Mental Status: He is alert and oriented to person, place, and time.   Psychiatric:         Behavior: Behavior normal.         Labs Recent and today in the office:  Results for orders placed or performed in visit on 01/12/21   POC Urinalysis Dipstick, Automated    Specimen: Urine   Result Value Ref Range    Color Yellow Yellow, Straw, Dark Yellow, Izzy    Clarity, UA Clear Clear    Specific Gravity  1.025 1.005 - 1.030    pH, Urine 6.0 5.0 - 8.0    Leukocytes Negative Negative    Nitrite, UA Negative Negative    Protein, POC Negative Negative mg/dL    Glucose, UA Negative Negative, 1000 mg/dL (3+) mg/dL    Ketones, UA Negative Negative    Urobilinogen, UA Normal Normal    Bilirubin Negative Negative    Blood, UA Negative Negative         Assessment & Plan  BPH with outlet obstruction/enuresis: Patient has clear urine and his problem can be best handled with diapers since he is emptying the bladder well during the day.    Rectal pain/fecal impaction: Patient has been dealing with hemorrhoids with steroid cream and sitting on a heating pad but on my exam today he is found to have a fecal impaction that will need to be dealt with and then a regular constipation regimen will be instituted.  Because he is so tender on exam I suggested  he go to the emergency room so he can be given some pain medicine.  This is discussed with his family doctor Dr. Blair and she agrees.

## 2021-01-12 NOTE — ED PROVIDER NOTES
Subjective   89-year-old male presents emerged department with concern for fecal impaction, he was seen at Dr. Monique's office for urology problems, family states that he did a prostate exam, and the patient could not tolerate it, he is concerned about a fecal impaction.  He was sent to the emergency department for evaluation and treatment for possible fecal impaction.  States he has been having liquid stools, and some pain when he has a bowel movement.      History provided by:  Patient   used: No        Review of Systems   Gastrointestinal:        Possible fecal impaction   All other systems reviewed and are negative.      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)    • Sleep apnea    • Spermatocele    • Syncope, near    • URTI (acute upper respiratory infection)        Allergies   Allergen Reactions   • Ace Inhibitors    • Amoxicillin-Pot Clavulanate    • Cefaclor    • Hydrochlorothiazide    • Nitrofurantoin        Past Surgical History:   Procedure Laterality Date   • CARDIAC ELECTROPHYSIOLOGY PROCEDURE N/A 11/24/2020    Procedure: DEVICE IMPLANT;  Surgeon: Julius Chatmna DO;  Location: [facility] INVASIVE LOCATION;  Service: Cardiology;  Laterality: N/A;  MICRA   • CHOLECYSTECTOMY  20 years ago   • COLONOSCOPY  15-20 years ago   • COLONOSCOPY W/ POLYPECTOMY      Dr Toledo   • CYSTOSCOPY TRANSURETHRAL RESECTION OF PROSTATE     • PACEMAKER IMPLANTATION     • PROSTATECTOMY      non cancer, Dr Toledo   • TONSILLECTOMY  1931       Family History   Problem Relation Age of Onset   • Other Other         STROKE SYNDROME   • Colon cancer Neg Hx        Social History     Socioeconomic History   • Marital status:       Spouse name: Not on file   • Number of children: Not on file   • Years of education: Not on file   • Highest education level: Not on file   Tobacco Use   • Smoking status: Never Smoker   • Smokeless tobacco: Never Used   Substance and Sexual Activity   • Alcohol use: Not Currently     Alcohol/week: 3.0 standard drinks     Types: 3 Cans of beer per week   • Drug use: No   • Sexual activity: Defer           Objective   Physical Exam  Vitals signs and nursing note reviewed.   Constitutional:       Appearance: He is well-developed.   HENT:      Head: Normocephalic and atraumatic.   Neck:      Musculoskeletal: Normal range of motion.   Cardiovascular:      Rate and Rhythm: Normal rate and regular rhythm.   Pulmonary:      Effort: Pulmonary effort is normal.      Breath sounds: Normal breath sounds.   Abdominal:      General: Bowel sounds are normal.      Palpations: Abdomen is soft.   Musculoskeletal: Normal range of motion.   Skin:     General: Skin is warm and dry.   Neurological:      Mental Status: He is alert and oriented to person, place, and time.   Psychiatric:         Behavior: Behavior normal.         Thought Content: Thought content normal.         Judgment: Judgment normal.         Procedures           ED Course  ED Course as of Jan 12 1750   Tue Jan 12, 2021   1511 CT scan confirms fecal impaction, discussed the case with Dr. Gray, he is going to come and evaluate the patient and discuss possible sedation.    [CS]   1633 Able to remove stool on digital exam, will try soapsuds enema    [CS]   1700 Soapsuds enemas were performed with some mild relief.    [CS]   1718 Dr. Gray at the bedside, he attempted disimpaction was able to get stool out, will attempt another soapsuds enema    [CS]      ED Course User Index  [CS] Serg Santos Jr., PACYNTHIA                                           MDM  Number of Diagnoses or Management Options  Constipation, unspecified constipation type: new and requires  workup  Fecal impaction (CMS/HCC): new and requires workup     Amount and/or Complexity of Data Reviewed  Clinical lab tests: reviewed  Tests in the radiology section of CPT®: reviewed  Discuss the patient with other providers: yes    Risk of Complications, Morbidity, and/or Mortality  Presenting problems: minimal  Diagnostic procedures: minimal  Management options: minimal    Patient Progress  Patient progress: stable      Final diagnoses:   Fecal impaction (CMS/HCC)   Constipation, unspecified constipation type            Serg Santos Jr., PA-C  01/12/21 8920

## 2021-01-14 PROBLEM — K64.2 GRADE III HEMORRHOIDS: Status: ACTIVE | Noted: 2021-01-01

## 2021-01-14 NOTE — PROGRESS NOTES
"Chief Complaint   Patient presents with   • Follow-up     Pt went to [facility] ER on 1/12 due to concern for fecal impaction. Daughter would like a thick barrier cream sent to Pharmacy.      Subjective   Checo Michele is a 89 y.o. male.     Patient was seen in emergency room 2 days ago for fecal impaction that was documented on CT scan.  Patient was seen by Dr. Monique for enlarged prostate.  Dr. Monique was unable to do rectal exam due to severe pain from fecal impaction and hemorrhoids.  Patient was sent to the emergency room where CT scan revealed fecal impaction and severe constipation.  Daughter has been giving patient MiraLAX only once a week.  He required soapsuds enema and digital disimpaction in the emergency room.  After he got home he had a large BM.  He has not had any further BM. Daughter is increasing fruits and vegetables and water in diet.   He states his hemorrhoids are very painful.  Has been using the anusol cream twice a day alternating with prep H.  He occasionally sits on a warm heating pad also.        The following portions of the patient's history were reviewed and updated as appropriate: allergies, current medications, past family history, past medical history, past social history, past surgical history and problem list.    Review of Systems   Constitutional: Negative for appetite change.   Gastrointestinal: Positive for constipation and rectal pain.       Objective   /82   Pulse 82   Temp 97.3 °F (36.3 °C)   Ht 167.6 cm (66\")   Wt 54.4 kg (120 lb)   SpO2 98%   BMI 19.37 kg/m²   Body mass index is 19.37 kg/m².  Physical Exam  Vitals signs and nursing note reviewed.   Constitutional:       General: He is not in acute distress.     Appearance: Normal appearance. He is not ill-appearing.      Comments: Pleasant elderly gentleman, appears his age and in no distress, he is very thin   HENT:      Head: Normocephalic and atraumatic.      Right Ear: External ear normal.      Left Ear: " External ear normal.   Pulmonary:      Effort: Pulmonary effort is normal. No respiratory distress.   Abdominal:      General: Abdomen is flat. There is no distension.      Palpations: Abdomen is soft.      Tenderness: There is no abdominal tenderness.   Genitourinary:     Comments: Decreased rectal tone, large external hemorrhoids that are very tender to palpation noted, few internal hemorrhoids also noted, surrounding erythema of buttocks noted but no open areas  Musculoskeletal:      Right lower leg: No edema.      Left lower leg: No edema.   Neurological:      General: No focal deficit present.      Mental Status: He is alert.      Cranial Nerves: No cranial nerve deficit.      Motor: Weakness present.   Psychiatric:         Behavior: Behavior normal.         Assessment/Plan   Checo Michele is here today and the following problems have been addressed:      Diagnoses and all orders for this visit:    1. Constipation, unspecified constipation type (Primary)    2. Grade III hemorrhoids  -     Ambulatory Referral to General Surgery    recommend benefiber 1 T twice a day  Recommend miralax 1 cap daily  Recommend Butt Paste to rash as needed  Increase water intake, fruits and vegetables  Use anusol twice a day, may alternate with prep H  Use wet wipes after BMs  Refer to Dr. Knapp for evaluation and treatment of hemorrhoids     RTC as scheduled in 6 weeks    Please note that portions of this note were completed with a voice recognition program.  Efforts were made to edit dictation, but occasionally words are mistranscribed.

## 2021-01-20 NOTE — ED PROVIDER NOTES
Subjective   89-year-old male his daughter for complaint of fall.  Patient fell earlier in the day and daughter was concerned because he was acting little sleepy this afternoon and he had a scalp contusion so she brought him to the ED to be evaluated.  The patient is resting comfortably on my evaluation.  He denies pain.  Denies headache.  No lightheadedness or dizziness.  No chest pain.  No nausea vomiting diarrhea or abdominal pain.  No neck or back pain.  No other complaints at this time.          Review of Systems   HENT:        Minor head injury   All other systems reviewed and are negative.      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)    • Sleep apnea    • Spermatocele    • Syncope, near    • URTI (acute upper respiratory infection)        Allergies   Allergen Reactions   • Ace Inhibitors    • Amoxicillin-Pot Clavulanate    • Cefaclor    • Hydrochlorothiazide    • Nitrofurantoin        Past Surgical History:   Procedure Laterality Date   • CARDIAC ELECTROPHYSIOLOGY PROCEDURE N/A 11/24/2020    Procedure: DEVICE IMPLANT;  Surgeon: Julius Chatman DO;  Location: [facility] INVASIVE LOCATION;  Service: Cardiology;  Laterality: N/A;  MICRA   • CHOLECYSTECTOMY  20 years ago   • COLONOSCOPY  15-20 years ago   • COLONOSCOPY W/ POLYPECTOMY      Dr Toledo   • CYSTOSCOPY TRANSURETHRAL RESECTION OF PROSTATE     • PACEMAKER IMPLANTATION     • PROSTATECTOMY      non cancer, Dr Toledo   • TONSILLECTOMY  1931       Family History   Problem Relation Age of Onset   • Other Other         STROKE SYNDROME   • Colon cancer Neg Hx        Social History     Socioeconomic History   • Marital status:      Spouse name: Not on file   •  Number of children: Not on file   • Years of education: Not on file   • Highest education level: Not on file   Tobacco Use   • Smoking status: Never Smoker   • Smokeless tobacco: Never Used   Substance and Sexual Activity   • Alcohol use: Not Currently     Alcohol/week: 3.0 standard drinks     Types: 3 Cans of beer per week   • Drug use: No   • Sexual activity: Defer           Objective   Physical Exam  Vitals signs and nursing note reviewed.   Constitutional:       General: He is not in acute distress.     Appearance: He is well-developed. He is not diaphoretic.      Comments: Elderly-appearing   HENT:      Head: Normocephalic.      Comments: Contusion and abrasion to the right posterior scalp.     Nose: Nose normal.   Eyes:      Conjunctiva/sclera: Conjunctivae normal.      Pupils: Pupils are equal, round, and reactive to light.   Cardiovascular:      Rate and Rhythm: Normal rate and regular rhythm.   Pulmonary:      Effort: Pulmonary effort is normal. No respiratory distress.      Breath sounds: Normal breath sounds.   Chest:      Chest wall: No tenderness.   Abdominal:      General: There is no distension.      Palpations: Abdomen is soft.      Tenderness: There is no abdominal tenderness.   Musculoskeletal:         General: No tenderness or deformity.   Neurological:      Mental Status: He is alert and oriented to person, place, and time.      Cranial Nerves: No cranial nerve deficit.      Coordination: Coordination normal.         Procedures           ED Course                                           MDM  89-year-old elderly male presents to the ED status post fall earlier in the day.  Has a contusion to his posterior scalp.  CT brain is negative for acute process.  CT cervical spine negative for acute process.  Resting comfortably.  Chest and pelvis x-rays are negative for acute process.  He is ambulatory and appropriate for discharge at this time.      Final diagnoses:   Contusion of scalp, initial  encounter   Fall, initial encounter            Tor Malone, DO  01/19/21 7323

## 2021-02-01 NOTE — PROGRESS NOTES
Patient: Checo Michele    YOB: 1925    Date: 02/03/2021    Primary Care Provider: Vermeesch, Marilyn K, MD    Chief Complaint   Patient presents with   • Hemorrhoids       SUBJECTIVE:    History of present illness:  Patient is in the office today for evaluation and consultation for hemorrhoids. Patient complains of rectal bleeding for the past week. He complains of sharp rectal pain.  Patient had fecal impaction that was removed 2 weeks ago in the ER.  Patient now feels much better and having normal bowel movements.  Family concerned about recurrent constipation and hemorrhoid disease.  They have tried conservative therapy and done very well with complete resolution of bleeding and symptoms.    The following portions of the patient's history were reviewed and updated as appropriate: allergies, current medications, past family history, past medical history, past social history, past surgical history and problem list.      Review of Systems   Constitutional: Negative for chills, fever and unexpected weight change.   HENT: Negative for trouble swallowing and voice change.    Eyes: Negative for visual disturbance.   Respiratory: Negative for apnea, cough, chest tightness, shortness of breath and wheezing.    Cardiovascular: Negative for chest pain, palpitations and leg swelling.   Gastrointestinal: Positive for anal bleeding and rectal pain. Negative for abdominal distention, abdominal pain, blood in stool, constipation, diarrhea, nausea and vomiting.   Endocrine: Negative for cold intolerance and heat intolerance.   Genitourinary: Negative for difficulty urinating, dysuria, flank pain, scrotal swelling and testicular pain.   Musculoskeletal: Negative for back pain, gait problem and joint swelling.   Skin: Negative for color change, rash and wound.   Neurological: Negative for dizziness, syncope, speech difficulty, weakness, numbness and headaches.   Hematological: Negative for adenopathy. Does  not bruise/bleed easily.   Psychiatric/Behavioral: Negative for confusion. The patient is not nervous/anxious.        Allergies:  Allergies   Allergen Reactions   • Ace Inhibitors    • Amoxicillin-Pot Clavulanate    • Cefaclor    • Hydrochlorothiazide    • Nitrofurantoin        Medications:    Current Outpatient Medications:   •  acetaminophen (TYLENOL) 325 MG tablet, Take 2 tablets by mouth Every 4 (Four) Hours As Needed for Mild Pain ., Disp: 60 tablet, Rfl: 0  •  aspirin 81 MG tablet, Take  by mouth daily., Disp: , Rfl:   •  atorvastatin (LIPITOR) 20 MG tablet, TAKE 1 TABLET DAILY (Patient taking differently: One in the evening), Disp: 90 tablet, Rfl: 4  •  candesartan (ATACAND) 4 MG tablet, TAKE 1 TABLET DAILY FOR BLOOD PRESSURE, Disp: 90 tablet, Rfl: 3  •  carvedilol (COREG) 12.5 MG tablet, Take 1 tablet by mouth 2 (Two) Times a Day With Meals., Disp: , Rfl:   •  clonazePAM (KlonoPIN) 1 MG tablet, TAKE 1 TABLET BY MOUTH EVERY EVENING AS NEEDED FOR SEIZURES (Patient taking differently: Take 1 mg by mouth At Night As Needed.), Disp: 90 tablet, Rfl: 0  •  escitalopram (LEXAPRO) 10 MG tablet, Take 1 tablet by mouth Daily., Disp: 90 tablet, Rfl: 3  •  furosemide (LASIX) 20 MG tablet, Take 1 tablet by mouth Daily., Disp: 30 tablet, Rfl: 0  •  Hydrocortisone, Perianal, (ANUSOL-HC) 2.5 % rectal cream, Insert  into the rectum 2 (Two) Times a Day., Disp: 28 g, Rfl: 2  •  levothyroxine (SYNTHROID, LEVOTHROID) 50 MCG tablet, Take 1 tablet by mouth Daily., Disp: 90 tablet, Rfl: 3  •  tolterodine LA (DETROL LA) 4 MG 24 hr capsule, TAKE 1 CAPSULE DAILY (Patient taking differently: QHS), Disp: 90 capsule, Rfl: 3    History:  Past Medical History:   Diagnosis Date   • Abdominal pain, RLQ (right lower quadrant)    • Anxiety    • Arthritis    • CAD (coronary artery disease)    • Colon polyp    • Enlarged prostate    • Hypertension    • Impaired functional mobility, balance, gait, and endurance    • Macular degeneration    •  "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)    • Sleep apnea    • Spermatocele    • Syncope, near    • URTI (acute upper respiratory infection)        Past Surgical History:   Procedure Laterality Date   • CARDIAC ELECTROPHYSIOLOGY PROCEDURE N/A 11/24/2020    Procedure: DEVICE IMPLANT;  Surgeon: Julius Chatman DO;  Location: [facility] INVASIVE LOCATION;  Service: Cardiology;  Laterality: N/A;  MICRA   • CHOLECYSTECTOMY  20 years ago   • COLONOSCOPY  15-20 years ago   • COLONOSCOPY W/ POLYPECTOMY      Dr Toledo   • CYSTOSCOPY TRANSURETHRAL RESECTION OF PROSTATE     • PACEMAKER IMPLANTATION     • PROSTATECTOMY      non cancer, Dr Toledo   • TONSILLECTOMY  1931       Family History   Problem Relation Age of Onset   • Other Other         STROKE SYNDROME   • Colon cancer Neg Hx        Social History     Tobacco Use   • Smoking status: Never Smoker   • Smokeless tobacco: Never Used   Substance Use Topics   • Alcohol use: Not Currently     Alcohol/week: 3.0 standard drinks     Types: 3 Cans of beer per week   • Drug use: No        OBJECTIVE:    Vital Signs:   Vitals:    02/03/21 1105   BP: 128/80   Pulse: 102   Temp: 98.2 °F (36.8 °C)   SpO2: 94%   Weight: 55.8 kg (123 lb)   Height: 177.8 cm (70\")       Physical Exam:   General Appearance:    Alert, cooperative, in no acute distress   Head:    Normocephalic, without obvious abnormality, atraumatic   Eyes:            Lids and lashes normal, conjunctivae and sclerae normal, no   icterus, no pallor, corneas clear, PERRLA   Ears:    Ears appear intact with no abnormalities noted   Throat:   No oral lesions, no thrush, oral mucosa moist   Neck:   No adenopathy, supple, trachea midline, no thyromegaly, no   carotid bruit, no JVD   Lungs:     Clear to auscultation,respirations regular, even and                  unlabored    Heart:    Regular rhythm and normal rate, normal S1 and S2, no            murmur, no gallop, no rub, no click   Chest Wall:    No abnormalities observed   Abdomen:     " Normal bowel sounds, no masses, no organomegaly, soft        non-tender, non-distended, no guarding, no rebound                tenderness   Extremities:   Moves all extremities well, no edema, no cyanosis, no             redness   Pulses:   Pulses palpable and equal bilaterally   Skin:   No bleeding, bruising or rash   Lymph nodes:   No palpable adenopathy   Neurologic:   Cranial nerves 2 - 12 grossly intact, sensation intact, DTR       present and equal bilaterally  Rectal-no significant hemorrhoid disease.  Small internal hemorrhoid only.  No impaction.     Results Review:   I reviewed the patient's new clinical results.    Review of Systems was reviewed and confirmed as accurate as documented by the MA.    ASSESSMENT/PLAN:    1. Chronic idiopathic constipation    2. Internal hemorrhoids        Patient and family reassured that hemorrhoids have resolved.  No evidence of bleeding or prolapse.  No evidence of impaction.  Continue MiraLAX and Benefiber daily as well as local care with wipes.  Follow-up with me as needed.  No need for colonoscopy due to patient age.    I discussed the patients findings and my recommendations with patient        Electronically signed by Damaris Knapp MD  02/03/21    Portions of this note has been scribed for Damaris Knapp MD by Hanh Eckert. 2/3/2021  11:57 EST

## 2021-02-09 NOTE — PROGRESS NOTES
Subjective   Patient ID: Checo Michele is a 89 y.o. right hand dominant male  Pain of the Right Hip (Reports a fall at home on 1/30/2021, seen at [facility] ER, hip xray negative for fx, daughter reports he is doing better, he doesn't complain of pain, would like to evaluate bruising down his leg)         History of Present Illness    Patient presents as a new patient with his daughter for swelling to the right lateral hip.  His daughter states he had a fall at home on 1/30/2021.  She states he got up to urinate but did not use his walker.  He went to the emergency room immediately after the fall where he had x-rays which were negative for acute fracture.  He has been able to bear weight.  He denies anterior groin hip pain.  He states he does have some soreness to the side of the right hip.  His daughter did notice some bruising and swelling to the right lateral aspect of the hip.  There has been no fever or chills.    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)    • Sleep apnea    • Spermatocele    • Syncope, near    • URTI (acute upper respiratory infection)         Past Surgical History:   Procedure Laterality Date   • CARDIAC ELECTROPHYSIOLOGY PROCEDURE N/A 11/24/2020    Procedure: DEVICE IMPLANT;  Surgeon: Julius Chatman DO;  Location: [facility] INVASIVE LOCATION;  Service: Cardiology;  Laterality: N/A;  MICRA   • CHOLECYSTECTOMY  20 years ago   • COLONOSCOPY  15-20 years ago   • COLONOSCOPY W/ POLYPECTOMY      Dr Toledo   • CYSTOSCOPY TRANSURETHRAL RESECTION OF PROSTATE     • PACEMAKER IMPLANTATION     • PROSTATECTOMY      non cancer, Dr Toledo   • TONSILLECTOMY  1931       Family History   Problem Relation Age of Onset    • Other Other         STROKE SYNDROME   • Colon cancer Neg Hx        Social History     Socioeconomic History   • Marital status:      Spouse name: Not on file   • Number of children: Not on file   • Years of education: Not on file   • Highest education level: Not on file   Tobacco Use   • Smoking status: Never Smoker   • Smokeless tobacco: Never Used   Substance and Sexual Activity   • Alcohol use: Not Currently     Alcohol/week: 3.0 standard drinks     Types: 3 Cans of beer per week   • Drug use: No   • Sexual activity: Defer         Current Outpatient Medications:   •  acetaminophen (TYLENOL) 325 MG tablet, Take 2 tablets by mouth Every 4 (Four) Hours As Needed for Mild Pain ., Disp: 60 tablet, Rfl: 0  •  aspirin 81 MG tablet, Take  by mouth daily., Disp: , Rfl:   •  atorvastatin (LIPITOR) 20 MG tablet, TAKE 1 TABLET DAILY (Patient taking differently: One in the evening), Disp: 90 tablet, Rfl: 4  •  candesartan (ATACAND) 4 MG tablet, TAKE 1 TABLET DAILY FOR BLOOD PRESSURE, Disp: 90 tablet, Rfl: 3  •  carvedilol (COREG) 12.5 MG tablet, Take 1 tablet by mouth 2 (Two) Times a Day With Meals., Disp: , Rfl:   •  clindamycin (Cleocin) 150 MG capsule, Take 1 capsule by mouth 2 (Two) Times a Day With Meals., Disp: 14 capsule, Rfl: 0  •  clonazePAM (KlonoPIN) 1 MG tablet, TAKE 1 TABLET BY MOUTH EVERY EVENING AS NEEDED FOR SEIZURES (Patient taking differently: Take 1 mg by mouth At Night As Needed.), Disp: 90 tablet, Rfl: 0  •  escitalopram (LEXAPRO) 10 MG tablet, Take 1 tablet by mouth Daily., Disp: 90 tablet, Rfl: 3  •  furosemide (LASIX) 20 MG tablet, Take 1 tablet by mouth Daily., Disp: 30 tablet, Rfl: 0  •  Hydrocortisone, Perianal, (ANUSOL-HC) 2.5 % rectal cream, Insert  into the rectum 2 (Two) Times a Day., Disp: 28 g, Rfl: 2  •  levothyroxine (SYNTHROID, LEVOTHROID) 50 MCG tablet, Take 1 tablet by mouth Daily., Disp: 90 tablet, Rfl: 3  •  tolterodine LA (DETROL LA) 4 MG 24 hr capsule, TAKE 1 CAPSULE DAILY  "(Patient taking differently: QHS), Disp: 90 capsule, Rfl: 3    Allergies   Allergen Reactions   • Ace Inhibitors    • Amoxicillin-Pot Clavulanate    • Cefaclor    • Hydrochlorothiazide    • Nitrofurantoin        Review of Systems   Constitutional: Negative for diaphoresis, fever and unexpected weight change.   HENT: Negative for dental problem and sore throat.    Eyes: Negative for visual disturbance.   Respiratory: Negative for shortness of breath.    Cardiovascular: Negative for chest pain.   Gastrointestinal: Negative for abdominal pain, constipation, diarrhea, nausea and vomiting.   Genitourinary: Negative for difficulty urinating and frequency.   Skin: Positive for color change (bruising right leg).   Neurological: Negative for headaches.   Hematological: Does not bruise/bleed easily.       I have reviewed the medical and surgical history, family history, social history, medications, and/or allergies, and the review of systems of this report.    Objective   Temp 96.4 °F (35.8 °C) (Skin)   Resp 18   Ht 177.8 cm (70\")   Wt 55.8 kg (123 lb)   BMI 17.65 kg/m²    Physical Exam  Vitals signs and nursing note reviewed.   Constitutional:       Comments: Patient is [place]   Pulmonary:      Effort: Pulmonary effort is normal. No respiratory distress.   Musculoskeletal:      Right hip: He exhibits swelling. He exhibits normal range of motion and no bony tenderness.      Lumbar back: He exhibits no tenderness and no bony tenderness.   Skin:     Capillary Refill: Capillary refill takes less than 2 seconds.   Neurological:      Mental Status: He is alert and oriented to person, place, and time.   Psychiatric:         Behavior: Behavior normal.       Ortho Exam   Extremity DVT signs are negative on physical exam with negative Nic sign, no calf pain, no palpable cords and no skin tone change   Neurologic Exam     Mental Status   Oriented to person, place, and time.   There is a swollen mildly tender 3 inch x 2.5 inch " hematoma to the right lateral hip adjacent to the greater trochanter.  There is no redness or warmth.    Patient ambulates without abnormal gait or pain.  He does utilize a walker    Using a sterile technique, I attempted to aspirate the swollen hematoma on the right hip.  There was no aspirate available I explained to the patient and his daughter that because it is so many days out from the injury the hematoma may have clotted blood within the actual encapsulated area therefore it would be very difficult to draw out blood through the syringe.    Assessment/Plan   Independent Review of Radiographic Studies:    No new imaging done today.  Study Result    PROCEDURE: XR HIP W OR WO PELVIS 2-3 VIEW RIGHT-     HISTORY: fall     COMPARISON: None.     FINDINGS:  A 2 view exam demonstrates no displaced fracture or  dislocation.  Mild degenerative changes of the bilateral hips. Sclerosis  is noted.  No soft tissue abnormality is seen.     IMPRESSION:  No displaced fracture.               This report was finalized on 1/30/2021 8:18 AM by Daljit Contreras DO.         Procedures       Diagnoses and all orders for this visit:    1. Thigh hematoma, right, initial encounter (Primary)  -     clindamycin (Cleocin) 150 MG capsule; Take 1 capsule by mouth 2 (Two) Times a Day With Meals.  Dispense: 14 capsule; Refill: 0    2. Accidental fall, initial encounter    3. Hearing loss, unspecified hearing loss type, unspecified laterality       Risk, benefits, and merits of treatment alternatives reviewed with the patient and questions answered  Weight bearing parameters reviewed    Recommendations/Plan:  Patient is encouraged to call or return for any issues or concerns.  We briefly discussed the option of open excision hematoma evacuation however given his medical comorbidities and advanced age we would like to avoid this if at all possible  I will place the patient on prophylactic low-dose antibiotics to prevent an infected hematoma.  Patient  agreeable to call or return sooner for any concerns.  I instructed the patient and his daughter to use warm moist heat to the right lateral hip 3 times daily no more than 20 minutes per application.    Because the patient is not having any pain to the hip when weightbearing I will refrain from ordering a CT scan at this time  Follow-up in 2 weeks or sooner if needed for any concerns.               EMR Dragon-transcription disclaimer:  This encounter note is an electronic transcription of spoken language to printed text.  Electronic transcription of spoken language may permit erroneous or at times nonsensical words or phrases to be inadvertently transcribed.  Although I have reviewed the note for such errors, some may still exist

## 2021-02-17 NOTE — TELEPHONE ENCOUNTER
Caller: Lacey Michele    Relationship to patient: Emergency Contact    Best call back number: [phone]    Concerns or Questions if Applicable: wants to know if its safe for the patient to get the covid vaccinee

## 2021-02-17 NOTE — TELEPHONE ENCOUNTER
Spoke with Lacey and informed that per Dr. Vermeesch that it was okay for patient to get the COVID vaccine.

## 2021-02-24 NOTE — PROGRESS NOTES
Subjective   Patient ID: Checo Michele is a 89 y.o. right hand dominant male  Follow-up of the Right Hip (Hematoma from fall on 1/30/21, daughter reports he has not used the heating pad as instructed,pt states the hematoma has not resolved )         History of Present Illness  Patient is following up for a scheduled appointment regarding right hip hematoma that he sustained after a mechanical fall on 1/30/2021.  Patient's daughter states he has not been using the warm compresses as recommended.  The patient states he has no pain to the hip.  He denies anterior hip pain.  Denies pain when weightbearing.  There has been no redness or warmth to the hematoma.  No fever or chills.  We did attempt an in office hematoma aspiration with an 18-gauge needle during his last office visit without success. 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)    • Sleep apnea    • Spermatocele    • Syncope, near    • URTI (acute upper respiratory infection)         Past Surgical History:   Procedure Laterality Date   • CARDIAC ELECTROPHYSIOLOGY PROCEDURE N/A 11/24/2020    Procedure: DEVICE IMPLANT;  Surgeon: Julius Chatman DO;  Location: [facility] INVASIVE LOCATION;  Service: Cardiology;  Laterality: N/A;  MICRA   • CHOLECYSTECTOMY  20 years ago   • COLONOSCOPY  15-20 years ago   • COLONOSCOPY W/ POLYPECTOMY      Dr Toledo   • CYSTOSCOPY TRANSURETHRAL RESECTION OF PROSTATE     • PACEMAKER IMPLANTATION     • PROSTATECTOMY      non cancer, Dr Toledo   • TONSILLECTOMY  1931       Family History   Problem Relation Age of Onset   • Other Other          STROKE SYNDROME   • Colon cancer Neg Hx        Social History     Socioeconomic History   • Marital status:      Spouse name: Not on file   • Number of children: Not on file   • Years of education: Not on file   • Highest education level: Not on file   Tobacco Use   • Smoking status: Never Smoker   • Smokeless tobacco: Never Used   Substance and Sexual Activity   • Alcohol use: Not Currently     Alcohol/week: 3.0 standard drinks     Types: 3 Cans of beer per week   • Drug use: No   • Sexual activity: Defer         Current Outpatient Medications:   •  acetaminophen (TYLENOL) 325 MG tablet, Take 2 tablets by mouth Every 4 (Four) Hours As Needed for Mild Pain ., Disp: 60 tablet, Rfl: 0  •  aspirin 81 MG tablet, Take  by mouth daily., Disp: , Rfl:   •  atorvastatin (LIPITOR) 20 MG tablet, TAKE 1 TABLET DAILY (Patient taking differently: One in the evening), Disp: 90 tablet, Rfl: 4  •  candesartan (ATACAND) 4 MG tablet, TAKE 1 TABLET DAILY FOR BLOOD PRESSURE, Disp: 90 tablet, Rfl: 3  •  carvedilol (COREG) 12.5 MG tablet, Take 1 tablet by mouth 2 (Two) Times a Day With Meals., Disp: , Rfl:   •  clindamycin (Cleocin) 150 MG capsule, Take 1 capsule by mouth 2 (Two) Times a Day With Meals., Disp: 14 capsule, Rfl: 0  •  clonazePAM (KlonoPIN) 1 MG tablet, TAKE 1 TABLET BY MOUTH EVERY EVENING AS NEEDED FOR SEIZURES (Patient taking differently: Take 1 mg by mouth At Night As Needed.), Disp: 90 tablet, Rfl: 0  •  escitalopram (LEXAPRO) 10 MG tablet, Take 1 tablet by mouth Daily., Disp: 90 tablet, Rfl: 3  •  furosemide (LASIX) 20 MG tablet, Take 1 tablet by mouth Daily., Disp: 30 tablet, Rfl: 0  •  Hydrocortisone, Perianal, (ANUSOL-HC) 2.5 % rectal cream, Insert  into the rectum 2 (Two) Times a Day., Disp: 28 g, Rfl: 2  •  levothyroxine (SYNTHROID, LEVOTHROID) 50 MCG tablet, Take 1 tablet by mouth Daily., Disp: 90 tablet, Rfl: 3  •  tolterodine LA (DETROL LA) 4 MG 24 hr capsule, TAKE 1 CAPSULE DAILY (Patient taking  "differently: QHS), Disp: 90 capsule, Rfl: 3    Allergies   Allergen Reactions   • Ace Inhibitors    • Amoxicillin-Pot Clavulanate    • Cefaclor    • Hydrochlorothiazide    • Nitrofurantoin        Review of Systems   Constitutional: Negative for diaphoresis, fever and unexpected weight change.   HENT: Negative for dental problem and sore throat.    Eyes: Negative for visual disturbance.   Respiratory: Negative for shortness of breath.    Cardiovascular: Negative for chest pain.   Gastrointestinal: Negative for abdominal pain, constipation, diarrhea, nausea and vomiting.   Genitourinary: Negative for difficulty urinating and frequency.   Musculoskeletal: Positive for arthralgias (tenderness to right hip) and gait problem (walks with a walker).   Neurological: Negative for headaches.   Hematological: Does not bruise/bleed easily.        I have reviewed the medical and surgical history, family history, social history, medications, and/or allergies, and the review of systems of this report.    Objective   Resp 18   Ht 177.8 cm (70\")   Wt 55.8 kg (123 lb)   BMI 17.65 kg/m²    Physical Exam  Ortho Exam   Extremity DVT signs are negative on physical exam with negative Nic sign, no calf pain, no palpable cords and no skin tone change   Neurologic Exam     There is less bruising to the right side the bruising that extended into the distal posterior thigh has completely resolved.  The right lateral thigh reveals a well demarcated raised flesh-colored fluctuant mass.  No redness, no warmth, no skin injury no drainage no tenderness to palpation.  The fluctuant mass is approximately 3 inches x 2 inches.    Patient ambulates without an abnormal gait pattern utilizing a walker      Assessment/Plan   Independent Review of Radiographic Studies:    No new imaging done today.  Reviewed imaging with patient at a prior visit.      Procedures       Diagnoses and all orders for this visit:    1. Thigh hematoma, right, initial " encounter (Primary)    2. Accidental fall, initial encounter    3. Hearing loss, unspecified hearing loss type, unspecified laterality       Discussion of orthopedic goals  Risk, benefits, and merits of treatment alternatives reviewed with the patient and questions answered  Ice, heat, and/or modalities as beneficial  Watch for signs and symptoms of infection    Recommendations/Plan:  Patient and guardian(s) are encouraged to call or return for any issues or concerns.    We discussed the option of operating on the right hematoma for evacuation of the presumed clotted blood.  However, the patient is willing to try to continue using warm heat and allowing additional time to see if the hematoma will continue to improve  Follow-up in 10 to 12 days, please notify the office immediately should you develop any signs of infection around the right hip hematoma.     I did instruct the patient to use warm heat at least once daily overlying the right hematoma.  His daughter agrees to evaluate the right hip hematoma once daily and will notify the office should there be any signs or symptoms of infection.  We did review symptoms associated with infectious etiology  Patient agreeable to call or return sooner for any concerns.             EMR Dragon-transcription disclaimer:  This encounter note is an electronic transcription of spoken language to printed text.  Electronic transcription of spoken language may permit erroneous or at times nonsensical words or phrases to be inadvertently transcribed.  Although I have reviewed the note for such errors, some may still exist

## 2021-03-01 NOTE — PROGRESS NOTES
Cardiac Electrophysiology Outpatient Follow Up Note            [facility]    Follow Up Office Visit      Checo Michele  [id number]  03/01/2021  [unfilled]  [unfilled]    Primary Care Physician: Vermeesch, Marilyn K, MD    Referred By: No ref. provider found    Subjective     Chief Complaint:   Diagnoses and all orders for this visit:    1. Complete heart block (CMS/HCC) (Primary)    2. Essential hypertension      Chief Complaint   Patient presents with   • Complete heart block (CMS/HCC)       History of Present Illness:   Checo Michele is a 89 y.o. male who presents to my electrophysiology clinic for follow up of above complaints.  He is actually doing quite well.  His appetite is reasonable.  Likes eating a lot healthier food.  He is still losing a little bit of weight.  No chest pain nausea vomit fevers or chills syncope presyncope.  With his daughter today.      Review of Systems:   Constitutional: No fevers or chills, no recent weight gain or weight loss or fatigue  Eyes: No visual loss, blurred vision, double vision, yellow sclerae.  ENT: No headaches, hearing loss, vertigo, congestion or sore throat.   Cardiovascular: Per HPI  Respiratory: No cough or wheezing, no sputum production, no hematemesis   Gastrointestinal: No abdominal pain, no nausea, vomiting, constipation, diarrhea, melena.   Genitourinary: No dysuria, hematuria or increased frequency.  Musculoskeletal:  No gait disturbance, weakness or joint pain or stiffness  Integumentary: No rashes, urticaria, ulcers or sores.   Neurological: No headache, dizziness, syncope, paralysis, ataxia, no prior CVA/TIA  Psychiatric: No anxiety, or depression  Endocrine: No diaphoresis, cold or heat intolerance. No polyuria or polydipsia.   Hematologic/Lymphatic: No anemia, abnormal bruising or bleeding. No history of DVT/PE.      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)    • Sleep apnea    • Spermatocele    • Syncope, near    • URTI (acute upper respiratory infection)        Past Surgical History:   Past Surgical History:   Procedure Laterality Date   • CARDIAC ELECTROPHYSIOLOGY PROCEDURE N/A 11/24/2020    Procedure: DEVICE IMPLANT;  Surgeon: Julius Chatman DO;  Location: [facility] INVASIVE LOCATION;  Service: Cardiology;  Laterality: N/A;  MICRA   • CHOLECYSTECTOMY  20 years ago   • COLONOSCOPY  15-20 years ago   • COLONOSCOPY W/ POLYPECTOMY      Dr Toledo   • CYSTOSCOPY TRANSURETHRAL RESECTION OF PROSTATE     • PACEMAKER IMPLANTATION     • PROSTATECTOMY      non cancer, Dr Toledo   • TONSILLECTOMY  1931       Family History:   Family History   Problem Relation Age of Onset   • Other Other         STROKE SYNDROME   • No Known Problems Mother    • No Known Problems Father    • Colon cancer Neg Hx        Social History:   Social History     Socioeconomic History   • Marital status:      Spouse name: Not on file   • Number of children: Not on file   • Years of education: Not on file   • Highest education level: Not on file   Tobacco Use   • Smoking status: Never Smoker   • Smokeless tobacco: Never Used   Substance and Sexual Activity   • Alcohol use: Not Currently     Alcohol/week: 3.0 standard drinks     Types: 3 Cans of beer per week   • Drug use: No   • Sexual activity: Defer       Medications:     Current Outpatient Medications:   •  acetaminophen (TYLENOL) 325 MG tablet, Take 2 tablets by mouth Every 4 (Four) Hours As Needed for Mild Pain ., Disp: 60 tablet, Rfl: 0  •  aspirin 81 MG tablet, Take  by mouth daily., Disp: , Rfl:   •  atorvastatin (LIPITOR) 20 MG tablet, TAKE 1  "TABLET DAILY (Patient taking differently: One in the evening), Disp: 90 tablet, Rfl: 4  •  candesartan (ATACAND) 4 MG tablet, TAKE 1 TABLET DAILY FOR BLOOD PRESSURE, Disp: 90 tablet, Rfl: 3  •  carvedilol (COREG) 12.5 MG tablet, Take 1 tablet by mouth 2 (Two) Times a Day With Meals., Disp: , Rfl:   •  clindamycin (Cleocin) 150 MG capsule, Take 1 capsule by mouth 2 (Two) Times a Day With Meals., Disp: 14 capsule, Rfl: 0  •  clonazePAM (KlonoPIN) 1 MG tablet, TAKE 1 TABLET BY MOUTH EVERY EVENING AS NEEDED FOR SEIZURES (Patient taking differently: Take 1 mg by mouth At Night As Needed.), Disp: 90 tablet, Rfl: 0  •  escitalopram (LEXAPRO) 10 MG tablet, Take 1 tablet by mouth Daily., Disp: 90 tablet, Rfl: 3  •  furosemide (LASIX) 20 MG tablet, Take 1 tablet by mouth Daily., Disp: 30 tablet, Rfl: 0  •  Hydrocortisone, Perianal, (ANUSOL-HC) 2.5 % rectal cream, Insert  into the rectum 2 (Two) Times a Day., Disp: 28 g, Rfl: 2  •  levothyroxine (SYNTHROID, LEVOTHROID) 50 MCG tablet, Take 1 tablet by mouth Daily., Disp: 90 tablet, Rfl: 3  •  tolterodine LA (DETROL LA) 4 MG 24 hr capsule, TAKE 1 CAPSULE DAILY (Patient taking differently: QHS), Disp: 90 capsule, Rfl: 3    Allergies:   Allergies   Allergen Reactions   • Ace Inhibitors    • Amoxicillin-Pot Clavulanate    • Cefaclor    • Hydrochlorothiazide    • Nitrofurantoin        Objective   Vital Signs:   Vitals:    03/01/21 1405   BP: 122/70   BP Location: Right arm   Patient Position: Sitting   Pulse: 70   Weight: 55.3 kg (122 lb)   Height: 177.8 cm (70\")       PHYSICAL EXAM  General appearance: Awake, alert, cooperative  Head: Normocephalic, without obvious abnormality, atraumatic  Eyes: Conjunctivae/corneas clear, EOMs intact  Neck: no adenopathy, no carotid bruit, no JVD and thyroid: not enlarged  Lungs: clear to auscultation bilaterally and no rhonchi or crackles\", ' symmetric  Heart: regular rate and rhythm, S1, S2 normal, no murmur, click, rub or gallop  Abdomen: Soft, " non-tender, bowel sounds normal,  no organomegaly  Extremities: extremities normal, atraumatic, no cyanosis or edema  Skin: Skin color, turgor normal, no rashes or lesions  Neurologic: Grossly normal     Lab Results   Component Value Date    GLUCOSE 91 01/12/2021    CALCIUM 8.6 01/12/2021     (L) 01/12/2021    K 4.2 01/12/2021    CO2 27.4 01/12/2021    CL 92 (L) 01/12/2021    BUN 17 01/12/2021    CREATININE 0.77 01/12/2021    EGFRIFAFRI 103 12/03/2020    EGFRIFNONA 94 01/12/2021    BCR 22.1 01/12/2021    ANIONGAP 10.6 01/12/2021     Lab Results   Component Value Date    WBC 6.34 01/12/2021    HGB 12.5 (L) 01/12/2021    HCT 37.1 (L) 01/12/2021    MCV 92.5 01/12/2021     01/12/2021     Lab Results   Component Value Date    INR 1.1 10/30/2015    PROTIME 12.0 10/30/2015     Lab Results   Component Value Date    TSH 3.930 11/23/2020       Cardiac Testing:     I personally viewed and interpreted the patient's EKG/Telemetry/lab data    Procedures    Tobacco Cessation: N/A  Obstructive Sleep Apnea Screening: N/A    Assessment & Plan    Diagnoses and all orders for this visit:    1. Complete heart block (CMS/HCC) (Primary)    2. Essential hypertension         Diagnosis Plan   1. Complete heart block (CMS/HCC)   did perfectly with a single leadless cardiac pacemaker specifically Micra AV.  He is AV tracking 84% of the time.  He has adequate pacing sensing safety threshold margins.  Nominal device function.      This patient's Cardiac Implanted Electronic Device was manually interrogated and reprogrammed during the patient encounter today.  Iterative programming changes were manually made to determine the sensing threshold, pacing threshold, lead impedance as well as underlying cardiac rhythm.  These programming changes were not limited to but included some or all of the following when appropriate: pacing mode, programmed AV delays, blanking periods, and refractory periods.  Data obtained as a result of these  manual programing changes informed the patient's CIED permanent programming.   2. Essential hypertension   blood pressure well controlled.  Continue antihypertensive regimen with Atacand.     Body mass index is 17.51 kg/m².    I spent 17 minutes in consultation with this patient which included more than 65% of this time in direct face-to-face counseling, physical examination and discussion of my assessment and findings and shared decision making with the patient.  The remainder of the time not spent face to face was performing one, some or all of the following actions:  preparing to see this patient ( eg. Review of tests),  ordering medications, tests or procedures ), care coordination, discussion of the plan with other healthcare providers, documenting clinical information in Epic well as independently interpreting results and communicating results to patient, family and or caregiver.  All time noted occurred on the date of service.    Follow Up:       Thank you for allowing me to participate in the care of your patient. Please to not hesitate to contact me with additional questions or concerns.      Julius Chatman DO, FACC, RS  Cardiac Electrophysiologist  [facility] / [facility]

## 2021-03-08 NOTE — TELEPHONE ENCOUNTER
Daughter states Dr. Vermeesch recommended pt to take Lasix M,W, &F and reduce sodium intake. Daughter will call and make an appointment if this does not help.

## 2021-03-08 NOTE — TELEPHONE ENCOUNTER
Patient is having terrible swelling in his legs the daughter has gave him Lasix 20 mg every day for the last 3-4 days. Pt. Saw Dr. Singh last week and he put him on a diet. Pt has appointment with Dr. Vermeesch today. I asked about his weight but, he has not been weighted.    Please review and advise.

## 2021-03-08 NOTE — PROGRESS NOTES
Chief Complaint   Patient presents with   • Follow-up     for constipation, hemorrhoids, GERD, HLD, HTN, and hypothyroidism.      Subjective   Checo Michele is a 89 y.o. male.     Here today for follow up of multiple medical problems.   His BP and HR are good today.  He was seen by his cardiologist recently after pacer placement and he was happy with pacer capture.  He wanted Checo to gain some weight.  He has been having some edema in ankles and is wearing his compression stockings daily.  He has had a few falls in the past month, both were at night when getting up to urinate.  Daughter got him a urinal to use and this is helpful.   He fell on hip and developed a large hematoma. He has seen ortho for this, they could not drain this.  She has been applying heat about once a day.   He has good BMs, soft and regularly.  He is getting Benefiber twice a day and miralax as needed.  Saw surgeon and currently no need for surgery on hemorrhoids.   He sleeps a lot during the day.  He has some confusion/dementia.  Not much depression.            The following portions of the patient's history were reviewed and updated as appropriate: allergies, current medications, past family history, past medical history, past social history, past surgical history and problem list.    Review of Systems   Constitutional: Negative for activity change, appetite change and unexpected weight change.   Eyes: Negative for visual disturbance.   Respiratory: Negative for shortness of breath.    Cardiovascular: Negative for chest pain, palpitations and leg swelling.   Gastrointestinal: Negative for abdominal pain, blood in stool and constipation.   Musculoskeletal: Positive for gait problem.   Neurological: Negative for headaches.   Psychiatric/Behavioral: Positive for confusion and sleep disturbance. Negative for dysphoric mood. The patient is not nervous/anxious.        Objective   /62   Pulse 88   Temp 97.1 °F (36.2 °C)   Ht  "177.8 cm (70\")   Wt 57.6 kg (127 lb)   SpO2 97%   BMI 18.22 kg/m²   Body mass index is 18.22 kg/m².  Physical Exam  Vitals and nursing note reviewed.   Constitutional:       General: He is not in acute distress.     Appearance: Normal appearance. He is well-developed. He is not ill-appearing.      Comments: Pleasant elderly man, very hard of hearing, in no distress today, ambulates with his rolling walker   HENT:      Head: Normocephalic and atraumatic.      Right Ear: External ear normal.      Left Ear: External ear normal.   Eyes:      Extraocular Movements: Extraocular movements intact.      Conjunctiva/sclera: Conjunctivae normal.      Pupils: Pupils are equal, round, and reactive to light.      Comments: Arcus senilis noted   Neck:      Thyroid: No thyromegaly.   Cardiovascular:      Rate and Rhythm: Normal rate and regular rhythm.      Pulses: Normal pulses.      Heart sounds: Normal heart sounds. No murmur.      Comments: No carotid bruits  Pulmonary:      Effort: Pulmonary effort is normal. No respiratory distress.      Breath sounds: Normal breath sounds. No wheezing.   Musculoskeletal:         General: Normal range of motion.      Cervical back: Normal range of motion and neck supple.      Right lower leg: Edema present.      Left lower leg: Edema present.      Comments: +1 edema at ankles bilaterally   Lymphadenopathy:      Cervical: No cervical adenopathy.   Skin:     Findings: No rash.   Neurological:      General: No focal deficit present.      Mental Status: He is alert and oriented to person, place, and time. Mental status is at baseline.      Cranial Nerves: No cranial nerve deficit.      Motor: Weakness present.      Coordination: Coordination normal.      Gait: Gait abnormal.      Comments: Ambulates with rolling walker, hip flexor weakness noted, as patient has difficulty standing from low seated chair   Psychiatric:         Mood and Affect: Mood normal.         Behavior: Behavior normal.    "      Thought Content: Thought content normal.         Judgment: Judgment normal.         Assessment/Plan   Checo Michele is here today and the following problems have been addressed:      Diagnoses and all orders for this visit:    1. Essential hypertension (Primary)    2. Hyperlipidemia, unspecified hyperlipidemia type    3. 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 Chronic idiopathic constipation    Other orders  -     furosemide (LASIX) 20 MG tablet; Take one tab on M, W and F  Dispense: 30 tablet; Refill: 0        Follow heart healthy/low salt diet  Wear compression stockings daily, on in a.m. and off in p.m.  Monitor blood pressure on occasion  Recommend Lasix 20 mg 3 days a week, Monday, Wednesday and Friday to help chronic edema  Exercise as tolerated with chair exercises several days a week  Take all medications as prescribed  Continue Lexapro 10 mg daily and Klonopin at night as needed to help with sleep, although daughter states he is sleeping on and off throughout the day also, but mood is very good and she is able to carry on a good conversation with him, which is an improvement compared to several months ago  Hemorrhoids are much improved, he was recently seen from surgeon and there was no need for surgery, continue Benefiber and as needed MiraLAX    Return in about 3 months (around 6/8/2021) for Medicare Wellness.      Marilyn K. Vermeesch, MD      Please note that portions of this note were completed with a voice recognition program.  Efforts were made to edit dictation, but occasionally words are mistranscribed.

## 2021-03-09 NOTE — ED PROVIDER NOTES
Subjective   89-year-old male presenting with fall.  He states that at some point yesterday he had a fall onto his right hip.  Did not strike his head or lose consciousness.  Was feeling okay yesterday, had been bearing weight.  This morning woke up and had increased pain in the right posterior hip.  He denies any neck pain, headache, numbness, weakness or other complaints.  He also has a hematoma to his right anterior hip, the daughter tells us that is from a fall sometime in the past.          Review of Systems   Constitutional: Negative.    HENT: Negative.    Eyes: Negative.    Respiratory: Negative.    Cardiovascular: Negative.    Gastrointestinal: Negative.    Genitourinary: Negative.    Musculoskeletal: Positive for arthralgias.   Skin: Negative.    Neurological: Negative.    Psychiatric/Behavioral: Negative.        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)    • Sleep apnea    • Spermatocele    • Syncope, near    • URTI (acute upper respiratory infection)        Allergies   Allergen Reactions   • Ace Inhibitors    • Amoxicillin-Pot Clavulanate    • Cefaclor    • Hydrochlorothiazide    • Nitrofurantoin        Past Surgical History:   Procedure Laterality Date   • CARDIAC ELECTROPHYSIOLOGY PROCEDURE N/A 11/24/2020    Procedure: DEVICE IMPLANT;  Surgeon: Julius Chatman DO;  Location: [facility] INVASIVE LOCATION;  Service: Cardiology;  Laterality: N/A;  MICRA   • CHOLECYSTECTOMY  20 years ago   • COLONOSCOPY  15-20 years ago   • COLONOSCOPY W/ POLYPECTOMY      Dr Toledo   • CYSTOSCOPY TRANSURETHRAL RESECTION OF PROSTATE     • PACEMAKER IMPLANTATION     • PROSTATECTOMY      non cancer, Dr Toledo   • TONSILLECTOMY  1931        Family History   Problem Relation Age of Onset   • Other Other         STROKE SYNDROME   • No Known Problems Mother    • No Known Problems Father    • Colon cancer Neg Hx        Social History     Socioeconomic History   • Marital status:      Spouse name: Not on file   • Number of children: Not on file   • Years of education: Not on file   • Highest education level: Not on file   Tobacco Use   • Smoking status: Never Smoker   • Smokeless tobacco: Never Used   Vaping Use   • Vaping Use: Never used   Substance and Sexual Activity   • Alcohol use: Not Currently     Alcohol/week: 3.0 standard drinks     Types: 3 Cans of beer per week   • Drug use: No   • Sexual activity: Defer           Objective   Physical Exam  Vitals reviewed.   Constitutional:       General: He is not in acute distress.     Appearance: He is not toxic-appearing or diaphoretic.      Comments: Elderly, frail   HENT:      Head: Normocephalic and atraumatic.      Right Ear: External ear normal.      Left Ear: External ear normal.      Nose: Nose normal.      Mouth/Throat:      Mouth: Mucous membranes are moist.      Pharynx: Oropharynx is clear.   Eyes:      Extraocular Movements: Extraocular movements intact.      Conjunctiva/sclera: Conjunctivae normal.      Pupils: Pupils are equal, round, and reactive to light.   Cardiovascular:      Rate and Rhythm: Normal rate and regular rhythm.      Pulses: Normal pulses.      Heart sounds: Normal heart sounds.      Comments: 2+ distal pulses  Pulmonary:      Effort: Pulmonary effort is normal. No respiratory distress.      Breath sounds: Normal breath sounds.   Abdominal:      General: Bowel sounds are normal. There is no distension.      Tenderness: There is no abdominal tenderness.   Musculoskeletal:         General: No swelling or deformity. Normal range of motion.      Cervical back: Normal range of motion and neck supple.      Comments: Right hip with some decreased range of motion,  tenderness laterally and posteriorly, hematoma to the anterior lateral right upper thigh, all other extremities and joints are stable without tenderness   Skin:     General: Skin is warm and dry.      Capillary Refill: Capillary refill takes less than 2 seconds.      Findings: No rash.   Neurological:      General: No focal deficit present.      Mental Status: He is alert and oriented to person, place, and time.      Comments: Strength and sensation intact   Psychiatric:         Mood and Affect: Mood normal.         Behavior: Behavior normal.         Procedures           ED Course                                           MDM  Number of Diagnoses or Management Options  Hematoma  Injury of right hip, initial encounter  Diagnosis management comments: 89-year-old male with fall, hip pain.  Elderly frail man in no distress with exam as above.  He has normal vital signs and is neurovascular intact.  His exam does reveal some right hip tenderness.  Also has a older hematoma to the right thigh.  Will obtain x-ray.  He declines any pain medication at this time.  Disposition pending.    DDx: Fall, contusion, fracture, hematoma    10:30 EST Xray per radiology is negative. Will proceed with CT scan given his complaints.    11:42 EST CT per radiology is without acute fracture.  He does have a large hematoma on the thigh consistent with what were seen on exam.  Will place Ace wrap to help with some compression.  His daughter is very concerned about his pain, he has had no pain while here in the ED.  I have called him in a short course of Norco if needed at home.  We will have him follow-up with orthopedics for further evaluation.      Final diagnoses:   Injury of right hip, initial encounter   Hematoma            Adiel Mauricio MD  03/09/21 [id number]

## 2021-03-11 NOTE — OUTREACH NOTE
Patient Outreach Note    [facility]    RN-ACM outreach to patient.  Conversation this date with patient's daughter/caregiver Lacey.  Patient had an ED visit at [facility] 03/09/21.  Patient presented with c/o hip pain after a fall.  Patient was treated and discharged to home to follow with ortho.  Medication changes at discharge include the addition of Squires.  AVS, education and discharge medications reviewed with daughter who v/u.  She indicated patient's pain was well controlled without having to use the Norco.  Follow-up appointment with orthopedic specialty is scheduled for 03/15/21.    Patient received first COVID-19 vaccine yesterday and has second injection scheduled for 04/05/21.  MyChart is inactive, AD on file, and care gaps well addressed.  Patient was provided [facility] Nurse 24/7 Call Line with AVS.  Other needs/questions/concerns denied.  Daughter assists with care coordination needs.     Gertrudis Piña RN  Ambulatory     3/11/2021, 11:16 EST

## 2021-03-15 NOTE — PROGRESS NOTES
Subjective   Patient ID: Checo Michele is a 89 y.o. right hand dominant male  Follow-up of the Right Hip (Hip contusion, hematoma follow up from a fall on 1/30/21, daughter reports another fall on 3/8/21 to the right side, evaluated at [facility] ER, neg fx)         History of Present Illness  Patient is following up for scheduled appointment regarding right lateral hip hematoma status post fall.  He states he is having no pain.  His daughter states she has not had to provide him with any narcotic pain medication. She also believes the hip hematoma has become smaller.    She mentions that he recently had a mechanical fall which prompted a visit to the emergency room.  While in [facility] emergency room, he had a thorough work-up including a CT scan of the right hip which was negative for acute process.  He has been using an Ace wrap to the right thigh.  His daughter was thinking this may help with his stability.                                                 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)    • Sleep apnea    • Spermatocele    • Syncope, near    • URTI (acute upper respiratory infection)         Past Surgical History:   Procedure Laterality Date   • CARDIAC ELECTROPHYSIOLOGY PROCEDURE N/A 11/24/2020    Procedure: DEVICE IMPLANT;  Surgeon: Julius Chatman DO;  Location: [facility] INVASIVE LOCATION;  Service: Cardiology;  Laterality: N/A;  MICRA   • CHOLECYSTECTOMY  20 years ago   • COLONOSCOPY  15-20 years ago   • COLONOSCOPY W/ POLYPECTOMY      Dr Toledo   • CYSTOSCOPY TRANSURETHRAL RESECTION OF PROSTATE     • PACEMAKER IMPLANTATION     • PROSTATECTOMY      non cancer, Dr Toledo   • TONSILLECTOMY   1931       Family History   Problem Relation Age of Onset   • Other Other         STROKE SYNDROME   • No Known Problems Mother    • No Known Problems Father    • Colon cancer Neg Hx        Social History     Socioeconomic History   • Marital status:      Spouse name: Not on file   • Number of children: Not on file   • Years of education: Not on file   • Highest education level: Not on file   Tobacco Use   • Smoking status: Never Smoker   • Smokeless tobacco: Never Used   Vaping Use   • Vaping Use: Never used   Substance and Sexual Activity   • Alcohol use: Not Currently     Alcohol/week: 3.0 standard drinks     Types: 3 Cans of beer per week   • Drug use: No   • Sexual activity: Defer         Current Outpatient Medications:   •  acetaminophen (TYLENOL) 325 MG tablet, Take 2 tablets by mouth Every 4 (Four) Hours As Needed for Mild Pain ., Disp: 60 tablet, Rfl: 0  •  aspirin 81 MG tablet, Take  by mouth daily., Disp: , Rfl:   •  atorvastatin (LIPITOR) 20 MG tablet, TAKE 1 TABLET DAILY (Patient taking differently: One in the evening), Disp: 90 tablet, Rfl: 4  •  candesartan (ATACAND) 4 MG tablet, TAKE 1 TABLET DAILY FOR BLOOD PRESSURE, Disp: 90 tablet, Rfl: 3  •  carvedilol (COREG) 12.5 MG tablet, Take 1 tablet by mouth 2 (Two) Times a Day With Meals., Disp: , Rfl:   •  clonazePAM (KlonoPIN) 1 MG tablet, TAKE 1 TABLET BY MOUTH EVERY EVENING AS NEEDED FOR SEIZURES (Patient taking differently: Take 1 mg by mouth At Night As Needed.), Disp: 90 tablet, Rfl: 0  •  escitalopram (LEXAPRO) 10 MG tablet, Take 1 tablet by mouth Daily., Disp: 90 tablet, Rfl: 3  •  furosemide (LASIX) 20 MG tablet, Take one tab on M, W and F, Disp: 30 tablet, Rfl: 0  •  HYDROcodone-acetaminophen (NORCO) 5-325 MG per tablet, Take 0.5-1 tablets by mouth Every 6 (Six) Hours As Needed for Severe Pain ., Disp: 10 tablet, Rfl: 0  •  Hydrocortisone, Perianal, (ANUSOL-HC) 2.5 % rectal cream, Insert  into the rectum 2 (Two) Times a Day., Disp: 28 g,  "Rfl: 2  •  levothyroxine (SYNTHROID, LEVOTHROID) 50 MCG tablet, Take 1 tablet by mouth Daily., Disp: 90 tablet, Rfl: 3  •  tolterodine LA (DETROL LA) 4 MG 24 hr capsule, TAKE 1 CAPSULE DAILY (Patient taking differently: QHS), Disp: 90 capsule, Rfl: 3    Allergies   Allergen Reactions   • Ace Inhibitors    • Amoxicillin-Pot Clavulanate    • Cefaclor    • Hydrochlorothiazide    • Nitrofurantoin        Review of Systems   Constitutional: Negative for diaphoresis, fever and unexpected weight change.   HENT: Negative for dental problem and sore throat.    Eyes: Negative for visual disturbance.   Respiratory: Negative for shortness of breath.    Cardiovascular: Negative for chest pain.   Gastrointestinal: Negative for abdominal pain, constipation, diarrhea, nausea and vomiting.   Genitourinary: Negative for difficulty urinating and frequency.   Musculoskeletal: Positive for arthralgias and gait problem (walks with a walker).   Neurological: Negative for headaches.   Hematological: Does not bruise/bleed easily.       I have reviewed the medical and surgical history, family history, social history, medications, and/or allergies, and the review of systems of this report.    Objective   Temp 96.8 °F (36 °C) (Skin)   Resp 18   Ht 175.3 cm (69\")   Wt 57.6 kg (127 lb)   BMI 18.75 kg/m²    Physical Exam  Vitals and nursing note reviewed.   Constitutional:       Appearance: Normal appearance.   Pulmonary:      Effort: Pulmonary effort is normal.   Musculoskeletal:      Right hip: No tenderness, bony tenderness or crepitus. Normal range of motion. Normal strength.      Right knee: No swelling, deformity, effusion, erythema, ecchymosis or bony tenderness. No tenderness.        Legs:    Neurological:      Mental Status: He is alert and oriented to person, place, and time.       Right Knee Exam     Other   Effusion: no effusion present           Extremity DVT signs are negative on physical exam with negative Nic sign, no calf " pain, no palpable cords and no skin tone change   Neurologic Exam     Mental Status   Oriented to person, place, and time.      There is no new bruising to the right lower extremity        Assessment/Plan   Independent Review of Radiographic Studies:    No new imaging done today.  PROCEDURE: CT LOWER EXTREMITY RIGHT WO CONTRAST-     HISTORY: fall, right hip pain posterior, neg xray     COMPARISON: None .     PROCEDURE: Axial images were obtained through the right hip by computed  tomography. Sagittal and coronal reconstruction images were performed.      FINDINGS: There are no fractures or dislocations. The visualized pubic  symphysis and right sacroiliac joint are intact. Evaluation of the soft  tissues reveals a large hematoma in the subcutaneous soft tissues on the  right as well as in the deep soft tissues involving the origins of the  vastus lateralis and in the gluteus muscles.     IMPRESSION:  Large soft tissue hematoma with no fracture or dislocation  evident.        This study was performed with techniques to keep radiation doses as low  as reasonably achievable (ALARA). Individualized dose reduction  techniques using automated exposure control or adjustment of mA and/or  kV according to the patient size were employed.      This report was finalized on 3/9/2021 11:07 AM by Glenis Heller M.D..    Narrative & Impression   PROCEDURE: XR HIP W OR WO PELVIS 2-3 VIEW RIGHT-     History: fall, pain     COMPARISON:January 30, 2021.     FINDINGS:  A 3 view exam demonstrates no acute fracture or dislocation.  The joint spaces are preserved. There is soft tissue swelling over the  right hip.     IMPRESSION:  No acute fracture. Soft tissue swelling.                    Images were reviewed, interpreted, and dictated by Dr. Glenis Heller M.D.  Transcribed by Nataliia Cheng PA-C.     This report was finalized on 3/9/2021 3:22 PM by Glenis Heller M.D..         Procedures       Diagnoses and all orders  for this visit:    1. Thigh hematoma, right, initial encounter (Primary)    2. Accidental fall, initial encounter    3. Hearing loss, unspecified hearing loss type, unspecified laterality       Orthopedic activities reviewed and patient expressed appreciation  Discussion of orthopedic goals  Risk, benefits, and merits of treatment alternatives reviewed with the patient and questions answered  Ice, heat, and/or modalities as beneficial    Recommendations/Plan:  Exercise, medications, injections, other patient advice, and return appointment as noted.  Patient is encouraged to call or return for any issues or concerns.    Encourage the patient to continue using warm heat as it is improving the right thigh hematoma.  Must utilize the walker with ambulation.  Follow-up in 3 weeks or sooner if needed  Patient agreeable to call or return sooner for any concerns.               EMR Dragon-transcription disclaimer:  This encounter note is an electronic transcription of spoken language to printed text.  Electronic transcription of spoken language may permit erroneous or at times nonsensical words or phrases to be inadvertently transcribed.  Although I have reviewed the note for such errors, some may still exist

## 2021-04-06 NOTE — PROGRESS NOTES
Subjective   Patient ID: Checo Michele is a 89 y.o. right hand dominant male  Follow-up of the Right Hip (Follow up right hip contusion, hematoma, DOI: 01/30/21 and 03/08/21. Reports doing better. )         History of Present Illness  Patient is very hard of hearing.  Patient is following up for scheduled follow-up visit.  Both he and his family state he is doing much better.  He continues to utilize a walker to assist with ambulation.  He has no pain at rest or when weightbearing.  Both he and his daughter state the hematoma has improved greatly.  No redness or warmth.  No fever or chills.      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)    • Sleep apnea    • Spermatocele    • Syncope, near    • URTI (acute upper respiratory infection)         Past Surgical History:   Procedure Laterality Date   • CARDIAC ELECTROPHYSIOLOGY PROCEDURE N/A 11/24/2020    Procedure: DEVICE IMPLANT;  Surgeon: Julius Chatman DO;  Location: [facility] INVASIVE LOCATION;  Service: Cardiology;  Laterality: N/A;  MICRA   • CHOLECYSTECTOMY  20 years ago   • COLONOSCOPY  15-20 years ago   • COLONOSCOPY W/ POLYPECTOMY      Dr Toledo   • CYSTOSCOPY TRANSURETHRAL RESECTION OF PROSTATE     • PACEMAKER IMPLANTATION     • PROSTATECTOMY      non cancer, Dr Toledo   • TONSILLECTOMY  1931       Family History   Problem Relation Age of Onset   • Other Other         STROKE SYNDROME   • No Known Problems Mother    • No Known Problems Father    • Colon cancer Neg Hx        Social History     Socioeconomic History   • Marital status:      Spouse name: Not on file   • Number of children: Not on file   • Years of education: Not on file   • Highest education  level: Not on file   Tobacco Use   • Smoking status: Never Smoker   • Smokeless tobacco: Never Used   Vaping Use   • Vaping Use: Never used   Substance and Sexual Activity   • Alcohol use: Not Currently     Alcohol/week: 3.0 standard drinks     Types: 3 Cans of beer per week   • Drug use: No   • Sexual activity: Defer         Current Outpatient Medications:   •  acetaminophen (TYLENOL) 325 MG tablet, Take 2 tablets by mouth Every 4 (Four) Hours As Needed for Mild Pain ., Disp: 60 tablet, Rfl: 0  •  aspirin 81 MG tablet, Take  by mouth daily., Disp: , Rfl:   •  atorvastatin (LIPITOR) 20 MG tablet, TAKE 1 TABLET DAILY (Patient taking differently: One in the evening), Disp: 90 tablet, Rfl: 4  •  candesartan (ATACAND) 4 MG tablet, TAKE 1 TABLET DAILY FOR BLOOD PRESSURE, Disp: 90 tablet, Rfl: 3  •  carvedilol (COREG) 12.5 MG tablet, Take 1 tablet by mouth 2 (Two) Times a Day With Meals., Disp: , Rfl:   •  clonazePAM (KlonoPIN) 1 MG tablet, TAKE 1 TABLET BY MOUTH EVERY EVENING AS NEEDED FOR SEIZURES (Patient taking differently: Take 1 mg by mouth At Night As Needed.), Disp: 90 tablet, Rfl: 0  •  escitalopram (LEXAPRO) 10 MG tablet, Take 1 tablet by mouth Daily., Disp: 90 tablet, Rfl: 3  •  furosemide (LASIX) 20 MG tablet, Take one tab on M, W and F, Disp: 30 tablet, Rfl: 0  •  HYDROcodone-acetaminophen (NORCO) 5-325 MG per tablet, Take 0.5-1 tablets by mouth Every 6 (Six) Hours As Needed for Severe Pain ., Disp: 10 tablet, Rfl: 0  •  Hydrocortisone, Perianal, (ANUSOL-HC) 2.5 % rectal cream, Insert  into the rectum 2 (Two) Times a Day., Disp: 28 g, Rfl: 2  •  levothyroxine (SYNTHROID, LEVOTHROID) 50 MCG tablet, Take 1 tablet by mouth Daily., Disp: 90 tablet, Rfl: 3  •  tolterodine LA (DETROL LA) 4 MG 24 hr capsule, TAKE 1 CAPSULE DAILY (Patient taking differently: QHS), Disp: 90 capsule, Rfl: 3    Allergies   Allergen Reactions   • Ace Inhibitors    • Amoxicillin-Pot Clavulanate    • Cefaclor    • Hydrochlorothiazide    •  "Nitrofurantoin        Review of Systems   Constitutional: Negative for diaphoresis, fever and unexpected weight change.   HENT: Negative for dental problem and sore throat.    Eyes: Negative for visual disturbance.   Respiratory: Negative for shortness of breath.    Cardiovascular: Negative for chest pain.   Gastrointestinal: Negative for abdominal pain, constipation, diarrhea, nausea and vomiting.   Genitourinary: Negative for difficulty urinating and frequency.   Musculoskeletal: Positive for arthralgias (Right hip).   Neurological: Negative for headaches.   Hematological: Does not bruise/bleed easily.       I have reviewed the medical and surgical history, family history, social history, medications, and/or allergies, and the review of systems of this report.    Objective   Temp 96.9 °F (36.1 °C)   Ht 175.3 cm (69\")   Wt 57.6 kg (127 lb)   BMI 18.75 kg/m²    Physical Exam  Vitals and nursing note reviewed.   Constitutional:       Appearance: Normal appearance.   Cardiovascular:      Pulses: Normal pulses.   Musculoskeletal:      Right hip: No tenderness or bony tenderness. Normal range of motion.        Legs:    Neurological:      Mental Status: He is alert and oriented to person, place, and time.   Psychiatric:         Behavior: Behavior normal.       Ortho Exam   Extremity DVT signs are negative on physical exam with negative Nic sign, no calf pain, no palpable cords and no skin tone change   Neurologic Exam     Mental Status   Oriented to person, place, and time.        Patient ambulates with a walker.  No abnormal gait      Assessment/Plan   Independent Review of Radiographic Studies:    No new imaging done today.      Procedures       Diagnoses and all orders for this visit:    1. Thigh hematoma, right, initial encounter (Primary)    2. Hearing loss, unspecified hearing loss type, unspecified laterality       Discussion of orthopedic goals  Risk, benefits, and merits of treatment alternatives reviewed " with the patient and questions answered  Ice, heat, and/or modalities as beneficial  Watch for signs and symptoms of infection    Recommendations/Plan:  Patient is encouraged to call or return for any issues or concerns.  Follow-up as needed  Patient agreeable to call or return sooner for any concerns.           EMR Dragon-transcription disclaimer:  This encounter note is an electronic transcription of spoken language to printed text.  Electronic transcription of spoken language may permit erroneous or at times nonsensical words or phrases to be inadvertently transcribed.  Although I have reviewed the note for such errors, some may still exist

## 2021-05-13 PROBLEM — R41.89 COGNITIVE IMPAIRMENT: Status: ACTIVE | Noted: 2021-01-01

## 2021-05-13 PROBLEM — R29.6 FREQUENT FALLS: Status: ACTIVE | Noted: 2021-01-01

## 2021-05-13 PROBLEM — G93.31 POSTVIRAL FATIGUE SYNDROME: Status: ACTIVE | Noted: 2021-01-01

## 2021-05-13 NOTE — PROGRESS NOTES
Chief Complaint   Patient presents with   • Follow-up     Pt was taken to [facility] ER on 5/7 by daughter with complaints of fatigue.     Subjective   Checo Michele is a 89 y.o. male.     Here today for ER follow up from May 7th for fatigue.   CBC and CMP were obtained in ER.  His T bili was elevated, but has been in the past, likely rotor or Gilbert syndrome. CT abd in Jan showed a normal liver.  Denies any abdominal pain.  His chronic constipation issues are much improved with use of regular MiraLAX and increased fiber in diet per daughter.  She is handling all of his medications and meals now.  He recent had dental work and requires all pureed foods or protein drinks.    He is sleeping well but says he is always tired.  Daughter states that his symptoms appear to be improved since ER visit.  While in ER it was noted he had a bruise on right flank.  Daughter has been applying a heating pad and it is improved.  He had a fall in his bedroom last week.  He also has a skin tear on left forearm that occurred with another fall several weeks ago.  Daughter states that he has had increasing falls over the past 6 months.  She also states that his memory is slowly worsening.  Some days he is better than others but she is having more difficulty caring for him.  Her mother recently has been put in nursing home for rehab.  She and her mother had a discussion regarding Checo prior to her leaving for rehab and she also believes that it is time that perhaps they find assisted living or other help caring for Checo.         The following portions of the patient's history were reviewed and updated as appropriate: allergies, current medications, past family history, past medical history, past social history, past surgical history and problem list.    Review of Systems   Constitutional: Positive for fatigue. Negative for activity change and appetite change.   HENT: Positive for hearing loss.    Eyes: Negative.    Respiratory:  "Negative.    Cardiovascular: Negative.    Gastrointestinal: Negative for abdominal pain, constipation, nausea and vomiting.   Genitourinary:        Chronic urine incontinence   Musculoskeletal: Positive for gait problem.   Skin:        Large hematoma over right flank  Large scab/skin tear over left forearm   Allergic/Immunologic: Negative.    Neurological: Positive for weakness.   Hematological: Bruises/bleeds easily.   Psychiatric/Behavioral: Positive for confusion.       Objective   /60 (BP Location: Left arm, Patient Position: Sitting, Cuff Size: Adult)   Pulse 65   Temp 97.3 °F (36.3 °C) (Temporal)   Resp 18   Ht 175.3 cm (69\")   Wt 57.5 kg (126 lb 12.8 oz)   SpO2 91%   BMI 18.73 kg/m²   Body mass index is 18.73 kg/m².  Physical Exam  Vitals and nursing note reviewed.   Constitutional:       General: He is not in acute distress.     Appearance: Normal appearance. He is well-developed. He is not ill-appearing.      Comments: Kind and pleasant elderly man, appears stated age and in NAD today   HENT:      Head: Normocephalic and atraumatic.      Comments: Multiple SK and AK lesions noted over scalp     Right Ear: External ear normal.      Left Ear: External ear normal.      Ears:      Comments: Very hard of hearing, hearing aids in place  Eyes:      General:         Right eye: No discharge.         Left eye: No discharge.      Extraocular Movements: Extraocular movements intact.      Conjunctiva/sclera: Conjunctivae normal.   Neck:      Thyroid: No thyromegaly.      Comments: No thyromegaly or mass  Cardiovascular:      Rate and Rhythm: Normal rate and regular rhythm.      Pulses: Normal pulses.      Heart sounds: Murmur heard.        Comments: Systolic murmur grade 1/6 over precordium, occasional ectopic beat  Pulmonary:      Effort: Pulmonary effort is normal. No respiratory distress.      Breath sounds: Normal breath sounds. No wheezing or rhonchi.   Abdominal:      General: Bowel sounds are normal. " There is no distension.      Palpations: Abdomen is soft.      Tenderness: There is no abdominal tenderness.   Musculoskeletal:         General: Normal range of motion.      Cervical back: Normal range of motion and neck supple.      Right lower leg: No edema.      Left lower leg: No edema.   Lymphadenopathy:      Cervical: No cervical adenopathy.   Skin:     General: Skin is warm.      Findings: Bruising and rash present.      Comments: Left mid forearm with approximate 5 x 2.5 cm scabbed wound, no surrounding redness and no pain  Groin with mild erythema and dry skin  Right flank with large area of ecchymosis and approximate 7 cm hematoma and central area of ecchymosis, minimal tenderness to palpation   Neurological:      General: No focal deficit present.      Mental Status: He is alert. Mental status is at baseline.      Cranial Nerves: No cranial nerve deficit.      Motor: Weakness present.      Coordination: Coordination normal.      Gait: Gait abnormal.   Psychiatric:         Behavior: Behavior normal.      Comments: Flat affect, answers most questions appropriately, pleasantly confused         Assessment/Plan   Checo Michele is here today and the following problems have been addressed:      Diagnoses and all orders for this visit:    1. Postviral fatigue syndrome (Primary)    2. Frequent falls  -     Ambulatory Referral to Social Work    3. Cognitive impairment  -     Ambulatory Referral to Social Work    Emergency room labs reviewed today with patient and daughter  Elevated total bilirubin compared to baseline, but baseline is usually elevated also.  Explained to daughter that I suspect he has Rotor or Gilbert syndrome as CT scan of abdomen in January showed a completely normal liver.  All other liver enzymes are normal  Patient is currently in wheelchair and uses a walker at home due to frequent falls-currently has a large hematoma and a skin tear due to falls at home.  Daughter is uncomfortable  caring for him and is requesting referral to social work to help with options for possible placement or other caregivers to help with him at home  Patient has underlying complete heart block and depression that have worsened his cognitive impairment.  He also has significant hearing loss.  All of these issues increase the likelihood of his risk of falls.  In addition he has severe osteoarthritis of right knee which also causes falls as he does not like to use his walker or cane very often      Return to clinic next month for Medicare wellness exam    Please note that portions of this note were completed with a voice recognition program.  Efforts were made to edit dictation, but occasionally words are mistranscribed.

## 2021-05-14 NOTE — OUTREACH NOTE
"Patient Outreach Note    SW contacted pt's daughter Lacey. Lacey stated, \" I really could use some help with my parents, specifically my dad. He really needs 24 hour care but I can't afford Assisted Living and I am not wanting to wait around for Medicaid Waiver, I need something now\". ALINE let Lacey know about personal care aids but those are an out of pocket expense. Lacey stated, \" I thought so, but I have been talking to my sisters about this and I think if we pool our money together we can figure something out\". ALINE asked if Lacey had any other questions or needed any other resources. Lacey stated, \"No, I guess basically our only choice is to have someone come in full time. But I may have Lore, my sister call you if she has any questions. Thank you for calling\".     KYLIE Ko  Ambulatory     5/14/2021, 14:27 EDT      "

## 2021-05-25 NOTE — TELEPHONE ENCOUNTER
Please try plain Mucinex expectorant 1 tablet every morning and Zyrtec 10 mg every evening.  If his symptoms worsen, she needs to make appointment for him to be seen before the weekend.

## 2021-05-25 NOTE — TELEPHONE ENCOUNTER
Caller: Lacey Michele    Relationship: Emergency Contact    Best call back number: [phone]    What is the best time to reach you: ANYTIME    Who are you requesting to speak with (clinical staff, provider,  specific staff member): CLINICAL STAFF     Do you know the name of the person who called: DAUGHTER    What was the call regarding: PATIENTS DAUGHTER STATES THAT HE HAS DEVELOPED A WET COUGH. PATIENTS DAUGHTER WANTS TO KNOW SHE SHOULD DO ABOUT THIS.    Do you require a callback: YES

## 2021-05-26 NOTE — TELEPHONE ENCOUNTER
Jessica with [facility] left  on referral line that they can not accept code for diagnosis cognitive impairment. They need something to go with diagnosis as to what is causing patient fatigue and cognitive impairment.

## 2021-05-27 NOTE — TELEPHONE ENCOUNTER
Jessica notified, states they need documentation stating his complete heart block, depression, and osteoarthritis are causing Pt's fatigue and cognitive impairment. Jessica stated you could add this in the last office visit note.

## 2021-06-02 NOTE — TELEPHONE ENCOUNTER
Micah, occupational therapist with [facility] home care left vm on referral line to let us know they admitted patient to home health for ot, pt and  consult. Occupational therapy would like to see patient 1 time a week for 4 weeks for arm excersise program, adls, home safety, and adapt patient needs and patient-caregiver education.

## 2021-06-03 PROBLEM — Z91.09 ENVIRONMENTAL ALLERGIES: Status: ACTIVE | Noted: 2021-01-01

## 2021-06-03 PROBLEM — R05.9 COUGH: Status: ACTIVE | Noted: 2021-01-01

## 2021-06-03 PROBLEM — G93.31 POSTVIRAL FATIGUE SYNDROME: Status: RESOLVED | Noted: 2021-01-01 | Resolved: 2021-01-01

## 2021-06-03 NOTE — OUTREACH NOTE
"Patient Outreach Note    SW received a call from pt's daughter, Lore. Lore stated, \" Can we get someone to come in and let us know when it may be a good time to start looking at a nursing home placement?\". ALINE asked Lore if HH has been out to the home yet. Lore stated, \" Well let me look at my notes. Yes, it does look like they were there and a SW named Meliza came out and gave some information\". ALINE told Lore that PT/OT will come out as well and they will do an assessment to see what pt is able to do and they will work with Meliza to determine if a placement is a good fit or not. Lore stated, \" Oh ok, then we should probably see in a week or so how things go, then I will call you back and keep you updated\". ALINE told Lore that would be fine and she is welcome to call if she has any questions.     KYLIE Ko  Ambulatory     6/3/2021, 16:35 EDT      "

## 2021-06-03 NOTE — PROGRESS NOTES
"Chief Complaint   Patient presents with   • Abstract     cough,congestion/yellow      Subjective   Checo Michele is a 89 y.o. male.     Has been on mucinex and zyrtec over a week.  He has been encouraged to cough up mucous, seems to be getting better overall.  He has no fever and is eating well.      Cough  This is a recurrent problem. The current episode started in the past 7 days. The problem has been waxing and waning. The problem occurs hourly. The cough is productive of sputum. Associated symptoms include nasal congestion, postnasal drip and rhinorrhea. Pertinent negatives include no chest pain, ear pain, fever, sore throat, shortness of breath or wheezing. The symptoms are aggravated by lying down. Treatments tried: mucinex in AM and zyrtec at night. The treatment provided moderate relief. His past medical history is significant for environmental allergies.        The following portions of the patient's history were reviewed and updated as appropriate: allergies, current medications, past family history, past medical history, past social history, past surgical history and problem list.    Review of Systems   Constitutional: Negative for activity change, appetite change and fever.   HENT: Positive for postnasal drip and rhinorrhea. Negative for ear pain and sore throat.    Respiratory: Positive for cough. Negative for shortness of breath and wheezing.    Cardiovascular: Negative for chest pain and leg swelling.   Gastrointestinal: Negative.    Allergic/Immunologic: Positive for environmental allergies.       Objective   /79 (BP Location: Right arm, Patient Position: Sitting)   Pulse 87   Temp 96.8 °F (36 °C)   Resp 15   Ht 175.3 cm (69.02\")   Wt 60.3 kg (133 lb)   SpO2 93%   BMI 19.63 kg/m²   Body mass index is 19.63 kg/m².  Physical Exam  Vitals and nursing note reviewed.   Constitutional:       General: He is not in acute distress.     Appearance: Normal appearance. He is not " ill-appearing.      Comments: Kind and pleasant elderly man, appears his age and is in no distress today   HENT:      Head: Normocephalic and atraumatic.      Right Ear: Tympanic membrane, ear canal and external ear normal.      Left Ear: Tympanic membrane, ear canal and external ear normal.      Ears:      Comments: Hearing aids in place, removed for exam     Mouth/Throat:      Mouth: Mucous membranes are moist.      Pharynx: No posterior oropharyngeal erythema.   Eyes:      General:         Right eye: No discharge.         Left eye: No discharge.      Extraocular Movements: Extraocular movements intact.   Cardiovascular:      Rate and Rhythm: Normal rate and regular rhythm.      Pulses: Normal pulses.      Heart sounds: Murmur heard.        Comments: Systolic murmur grade 2/6 heard over precordium  Pulmonary:      Effort: Pulmonary effort is normal. No respiratory distress.      Breath sounds: Normal breath sounds. No wheezing or rhonchi.   Musculoskeletal:      Right lower leg: No edema.      Left lower leg: No edema.   Lymphadenopathy:      Cervical: No cervical adenopathy.   Neurological:      General: No focal deficit present.      Mental Status: He is alert and oriented to person, place, and time. Mental status is at baseline.      Motor: Weakness present.      Gait: Gait abnormal.      Comments: Patient is not ambulating today, he is seated in wheelchair   Psychiatric:         Mood and Affect: Mood normal.         Behavior: Behavior normal.         Thought Content: Thought content normal.         Judgment: Judgment normal.      Comments: Patient is quiet, answers questions appropriately, his daughter does most of the talking for him         Assessment/Plan   Checo Hameed Leny is here today and the following problems have been addressed:      Diagnoses and all orders for this visit:    1. Cough (Primary)    2. Environmental allergies    Patient has cough secondary to postnasal drip from  allergies  Continue Zyrtec every night throughout allergy season  Encouraged him to do deep breathing exercises 3 times daily to help with any sputum production he may have at lung bases  Continue Mucinex for 3 more days, take 1 dose in a.m. only  Daughter states that his symptoms are markedly improved over the past week with use of Mucinex and Zyrtec as noted above    Return to clinic next week for Medicare wellness exam with labs as already scheduled    Please note that portions of this note were completed with a voice recognition program.  Efforts were made to edit dictation, but occasionally words are mistranscribed.

## 2021-06-09 NOTE — TELEPHONE ENCOUNTER
Caller: Lacey Michele    Relationship: Emergency Contact    Best call back number:   [phone]    What is the best time to reach you ASAP    Who are you requesting to speak with (clinical staff, provider,  specific staff member): CLINICAL    What was the call regarding: SHE STATED PT HAS BEEN COUGHING UP BRIGHT GREEN MUCUS    SHE STATED PT IS BEING SEEN IN THE MORNING BUT IS REQUESTING SOMETHING BE CALLED IN TODAY    [id number] - [place], [place] - [address] [facility] [address] [facility] - [phone]  - [phone] FX     Do you require a callback: YES

## 2021-06-10 PROBLEM — J40 BRONCHITIS: Status: ACTIVE | Noted: 2021-01-01

## 2021-06-10 PROBLEM — R05.9 COUGH: Status: RESOLVED | Noted: 2021-01-01 | Resolved: 2021-01-01

## 2021-06-10 NOTE — PROGRESS NOTES
The ABCs of the Annual Wellness Visit  Subsequent Medicare Wellness Visit    Chief Complaint   Patient presents with   • Medicare Wellness-subsequent       Subjective   History of Present Illness:  Checo Michele is a 89 y.o. male who presents for a Subsequent Medicare Wellness Visit.  PMH of HTN, HLD, hypothyroid, GERD, stress incontinence, depression, osteoarthritis, cognitive impairment and frequent falls.  BP and HR are doing well today.  He denies any CP or SOA.  He denies any GERD sxs.  He denies any depression.  He is sleeping well most nights.  He does not have much joint pain.  He does have chronic allergy sxs.  He is currently having a lot of postnasal drip and a very loose cough.  He is taking zyrtec every night.  Does not complain of EA, ST.  He has a good appetite.     HEALTH RISK ASSESSMENT    Recent Hospitalizations:  Recently treated at the following:  [facility]    Current Medical Providers:  Patient Care Team:  Vermeesch, Marilyn K, MD as PCP - General (Internal Medicine & Pediatrics)  Damaris Knapp MD as Consulting Physician (General Surgery)  Annette Langley MSW as Ambulatory  ()    Smoking Status:  Social History     Tobacco Use   Smoking Status Never Smoker   Smokeless Tobacco Never Used       Alcohol Consumption:  Social History     Substance and Sexual Activity   Alcohol Use Not Currently   • Alcohol/week: 3.0 standard drinks   • Types: 3 Cans of beer per week       Depression Screen:   PHQ-2/PHQ-9 Depression Screening 5/13/2021   Little interest or pleasure in doing things 0   Feeling down, depressed, or hopeless 0   Total Score 0       Fall Risk Screen:  SHERRELL Fall Risk Assessment was completed, and patient is at HIGH risk for falls. Assessment completed on:6/10/2021    Health Habits and Functional and Cognitive Screening:  Functional & Cognitive Status 6/10/2021   Do you have difficulty preparing food and eating? Yes   Do you have  difficulty bathing yourself, getting dressed or grooming yourself? Yes   Do you have difficulty using the toilet? Yes   Do you have difficulty moving around from place to place? Yes   Do you have trouble with steps or getting out of a bed or a chair? Yes   Current Diet Well Balanced Diet   Dental Exam Up to date   Eye Exam Not up to date   Exercise (times per week) 0 times per week   Current Exercise Activities Include None   Do you need help using the phone?  Yes   Are you deaf or do you have serious difficulty hearing?  Yes   Do you need help with transportation? Yes   Do you need help shopping? Yes   Do you need help preparing meals?  Yes   Do you need help with housework?  Yes   Do you need help with laundry? Yes   Do you need help taking your medications? Yes   Do you need help managing money? Yes   Do you ever drive or ride in a car without wearing a seat belt? Yes   Have you felt unusual stress, anger or loneliness in the last month? No   Who do you live with? Spouse   If you need help, do you have trouble finding someone available to you? No   Have you been bothered in the last four weeks by sexual problems? No   Do you have difficulty concentrating, remembering or making decisions? Yes         Does the patient have evidence of cognitive impairment? Yes    Asprin use counseling:Taking ASA appropriately as indicated    Age-appropriate Screening Schedule:  Refer to the list below for future screening recommendations based on patient's age, sex and/or medical conditions. Orders for these recommended tests are listed in the plan section. The patient has been provided with a written plan.    Health Maintenance   Topic Date Due   • ZOSTER VACCINE (1 of 2) Never done   • LIPID PANEL  06/04/2021   • INFLUENZA VACCINE  08/01/2021   • TDAP/TD VACCINES  Discontinued          The following portions of the patient's history were reviewed and updated as appropriate: allergies, current medications, past family history,  past medical history, past social history, past surgical history and problem list.    Outpatient Medications Prior to Visit   Medication Sig Dispense Refill   • acetaminophen (TYLENOL) 325 MG tablet Take 2 tablets by mouth Every 4 (Four) Hours As Needed for Mild Pain . 60 tablet 0   • aspirin 81 MG tablet Take  by mouth daily.     • atorvastatin (LIPITOR) 20 MG tablet TAKE 1 TABLET DAILY 90 tablet 3   • candesartan (ATACAND) 4 MG tablet TAKE 1 TABLET DAILY FOR BLOOD PRESSURE 90 tablet 3   • carvedilol (COREG) 12.5 MG tablet Take 1 tablet by mouth 2 (Two) Times a Day With Meals.     • clonazePAM (KlonoPIN) 1 MG tablet TAKE 1 TABLET BY MOUTH EVERY EVENING AS NEEDED FOR SEIZURES (Patient taking differently: Take 1 mg by mouth At Night As Needed.) 90 tablet 0   • escitalopram (LEXAPRO) 10 MG tablet Take 1 tablet by mouth Daily. 90 tablet 3   • furosemide (LASIX) 20 MG tablet Take one tab on M, W and F 30 tablet 0   • Hydrocortisone, Perianal, (ANUSOL-HC) 2.5 % rectal cream Insert  into the rectum 2 (Two) Times a Day. 28 g 2   • levothyroxine (SYNTHROID, LEVOTHROID) 50 MCG tablet Take 1 tablet by mouth Daily. 90 tablet 3   • tolterodine LA (DETROL LA) 4 MG 24 hr capsule TAKE 1 CAPSULE DAILY (Patient taking differently: QHS) 90 capsule 3     No facility-administered medications prior to visit.       Patient Active Problem List   Diagnosis   • Gastroesophageal reflux disease without esophagitis   • Hyperlipidemia   • Hypertension   • Hypothyroidism   • Male urinary stress incontinence   • Osteoarthritis of knee   • Temporary cerebral vascular dysfunction   • Constipation   • Frequent use of laxatives   • Medicare annual wellness visit, subsequent   • Hypertensive encephalopathy   • Primary insomnia   • Episode of recurrent major depressive disorder (CMS/HCC)   • Bradycardia   • Complete heart block (CMS/HCC)   • Grade III hemorrhoids   • Frequent falls   • Cognitive impairment   • Environmental allergies   • Bronchitis  "      Advanced Care Planning:  ACP discussion was held with the patient during this visit. Patient has an advance directive in EMR which is still valid.     Review of Systems   Constitutional: Negative for activity change and appetite change.   HENT: Positive for hearing loss and postnasal drip.    Respiratory: Positive for cough. Negative for shortness of breath.    Cardiovascular: Negative for chest pain, palpitations and leg swelling.   Gastrointestinal: Negative for constipation.   Endocrine: Negative.    Genitourinary:        Chronic urine incontinence, wears a pull up   Musculoskeletal: Positive for arthralgias and gait problem.   Skin: Negative.    Allergic/Immunologic: Positive for environmental allergies.   Hematological: Bruises/bleeds easily.   Psychiatric/Behavioral: Positive for confusion. Negative for dysphoric mood and sleep disturbance. The patient is not nervous/anxious.        Compared to one year ago, the patient feels his physical health is worse.  Compared to one year ago, the patient feels his mental health is worse.    Reviewed chart for potential of high risk medication in the elderly: yes  Reviewed chart for potential of harmful drug interactions in the elderly:yes    Objective         Vitals:    06/10/21 0959   BP: 114/68   Pulse: 73   Temp: 97 °F (36.1 °C)   SpO2: 90%   Weight: 61.2 kg (135 lb)   Height: 175.3 cm (69.02\")   PainSc: 0-No pain       Body mass index is 19.92 kg/m².  Discussed the patient's BMI with him. The BMI is in the acceptable range.    Physical Exam  Vitals and nursing note reviewed.   Constitutional:       General: He is not in acute distress.     Appearance: Normal appearance. He is well-developed. He is not ill-appearing.      Comments: Kind and pleasant elderly male, appears stated age and in NAD today   HENT:      Head: Normocephalic and atraumatic.      Right Ear: Tympanic membrane, ear canal and external ear normal.      Left Ear: Tympanic membrane, ear canal and " external ear normal.      Ears:      Comments: Hearing aids in place, removed for ear exam, very hard of hearing  Eyes:      General:         Right eye: No discharge.         Left eye: No discharge.      Extraocular Movements: Extraocular movements intact.      Conjunctiva/sclera: Conjunctivae normal.      Pupils: Pupils are equal, round, and reactive to light.   Neck:      Thyroid: No thyromegaly.      Vascular: No carotid bruit.      Comments: No thyromegaly or mass  Cardiovascular:      Rate and Rhythm: Normal rate and regular rhythm.      Pulses: Normal pulses.      Heart sounds: Murmur heard.        Comments: Systolic murmur grade 2/6 heard over precordium, distant heart sounds  Pulmonary:      Effort: Pulmonary effort is normal. No respiratory distress.      Breath sounds: No wheezing.      Comments: Decreased breath sounds heard over right posterior lung field, no wheezes or rhonchi, left lung with normal breath sounds  Abdominal:      General: Bowel sounds are normal. There is no distension.      Palpations: Abdomen is soft.      Tenderness: There is no abdominal tenderness.   Musculoskeletal:         General: Normal range of motion.      Cervical back: Normal range of motion and neck supple.      Right lower leg: No edema.      Left lower leg: Edema present.      Comments: +1 edema in left lower extremity   Lymphadenopathy:      Cervical: No cervical adenopathy.   Skin:     General: Skin is warm.      Findings: No rash.      Comments: Multiple SK lesions noted over scalp   Neurological:      General: No focal deficit present.      Mental Status: He is alert and oriented to person, place, and time. Mental status is at baseline.      Cranial Nerves: No cranial nerve deficit.      Motor: No weakness.      Coordination: Coordination normal.      Gait: Gait abnormal.      Comments: Patient seated in wheelchair and not ambulatory today   Psychiatric:         Mood and Affect: Mood normal.         Behavior:  Behavior normal.      Comments: Answers questions appropriately although is very hard of hearing               Assessment/Plan   Medicare Risks and Personalized Health Plan  CMS Preventative Services Quick Reference  Cardiovascular risk  Dementia/Memory   Hearing Problem  Urinary Incontinence    The above risks/problems have been discussed with the patient.  Pertinent information has been shared with the patient in the After Visit Summary.  Follow up plans and orders are seen below in the Assessment/Plan Section.    Diagnoses and all orders for this visit:    1. Medicare annual wellness visit, subsequent (Primary)  -     CBC & Differential  -     Comprehensive Metabolic Panel  -     TSH  -     T4, Free  -     Lipid Panel With / Chol / HDL Ratio    2. Essential hypertension  -     CBC & Differential  -     Comprehensive Metabolic Panel    3. Hyperlipidemia, unspecified hyperlipidemia type  -     Comprehensive Metabolic Panel  -     Lipid Panel With / Chol / HDL Ratio    4. Gastroesophageal reflux disease without esophagitis    5. Cognitive impairment    6. Frequent falls    7. Other specified hypothyroidism  -     TSH  -     T4, Free    8. Bronchitis    Other orders  -     Triamcinolone Acetonide (NASACORT) 55 MCG/ACT nasal inhaler; 2 sprays into the nostril(s) as directed by provider Daily.  Dispense: 16.5 g; Refill: 11  -     doxycycline (DORYX) 100 MG enteric coated tablet; Take 1 tablet by mouth 2 (Two) Times a Day.  Dispense: 14 tablet; Refill: 0    Labs as noted  Follow heart healthy/low salt/low cholesterol diet  Avoid processed foods  Monitor blood pressure as discussed  Exercise as tolerated up to 30 minutes 5 days per week  Take all medications as prescribed   Patient denies any heartburn symptoms at this time, he is not taking any medication for this  Daughter brings patient to appointment today and states they have caregivers that are now with him 24 hours a day  He is in wheelchair today and uses  wheelchair or walker at all times to prevent falls  Currently has a very loose cough throughout our interview today and decreased breath sounds on right side, will treat with doxycycline for 1 week, encouraged daughter to provide Mucinex expectorant once daily in a.m. and encourage deep breathing exercises several times daily also  Recommend Nasacort 2 sprays each nostril every night in addition to Zyrtec to help with seasonal allergies  Continue Lexapro, mood is currently well controlled, he is also on Klonopin at night to help with sleep and anxiety  Continue Detrol LA for chronic stress incontinence, he also wears pull-ups      Follow Up:  Return in about 4 months (around 10/10/2021) for Next scheduled follow up.     An After Visit Summary and PPPS were given to the patient.

## 2021-06-11 PROBLEM — I50.21 SYSTOLIC CHF, ACUTE (HCC): Status: ACTIVE | Noted: 2021-01-01

## 2021-06-11 PROBLEM — J90 BILATERAL PLEURAL EFFUSION: Status: ACTIVE | Noted: 2021-01-01

## 2021-06-12 PROBLEM — E43 SEVERE MALNUTRITION (HCC): Status: ACTIVE | Noted: 2021-01-01

## 2021-06-12 NOTE — H&P
[facility]   HISTORY AND PHYSICAL      Name:  Checo Michele   Age:  89 y.o.  Sex:  male  :  1925  MRN:  [id number]   Visit Number:  [id number]  Admission Date:  2021  Date Of Service:  21  Primary Care Physician:  Vermeesch, Marilyn K, MD    Chief Complaint: SOB         History Of Presenting Illness:    Checo Michele is a 89 y.o. male with PMH as below who presented to the ED with Sob. No cp. He is a poor historian. Sob has been worsening today. Was found with pleural effusions and suspected of acute CHF. He said his oxygen drops at home.     Patient denied fever, chills, sore throat, earache, HA, sneezing/tearing from eyes,  back pain, abdominal pain, V/D, rash, or bruising.    No alleviating or aggravating Factors.  Data and labs personally reviewed. Patient is acutely ill and needs hospitalization. Patient  will require at least  one to two midnight stays due to illness. Patient needs Further work up and acute inpatient monitoring.       Review Of Systems:    All systems were reviewed and negative except for above mentioned in HPI.   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    Past Surgical History: Patient  has a past surgical history that includes Colonoscopy w/ polypectomy; Tonsillectomy (1931); Prostatectomy; Transurethral resection of prostate; Cholecystectomy (20 years ago); Colonoscopy (15-20 years ago); Cardiac  electrophysiology procedure (N/A, 11/24/2020); and Pacemaker Implantation.    Social History: Patient  reports that he has never smoked. He has never used smokeless tobacco. He reports previous alcohol use of about 3.0 standard drinks of alcohol per week. He reports that he does not use drugs.    Family History: Patient's family history includes No Known Problems in his father and mother; Other in an other family member.    Allergies:      Ace inhibitors, Amoxicillin-pot clavulanate, Cefaclor, Hydrochlorothiazide, and Nitrofurantoin    Home Medications:    Prior to Admission Medications     Prescriptions Last Dose Informant Patient Reported? Taking?    acetaminophen (TYLENOL) 325 MG tablet   No No    Take 2 tablets by mouth Every 4 (Four) Hours As Needed for Mild Pain .    aspirin 81 MG tablet   Yes No    Take  by mouth daily.    atorvastatin (LIPITOR) 20 MG tablet   No No    TAKE 1 TABLET DAILY    candesartan (ATACAND) 4 MG tablet   No No    TAKE 1 TABLET DAILY FOR BLOOD PRESSURE    carvedilol (COREG) 12.5 MG tablet   No No    Take 1 tablet by mouth 2 (Two) Times a Day With Meals.    clonazePAM (KlonoPIN) 1 MG tablet   No No    TAKE 1 TABLET BY MOUTH EVERY EVENING AS NEEDED FOR SEIZURES    Patient taking differently:  Take 1 mg by mouth At Night As Needed.    doxycycline (DORYX) 100 MG enteric coated tablet   No No    Take 1 tablet by mouth 2 (Two) Times a Day.    escitalopram (LEXAPRO) 10 MG tablet   No No    Take 1 tablet by mouth Daily.    furosemide (LASIX) 20 MG tablet   No No    Take one tab on M, W and F    Hydrocortisone, Perianal, (ANUSOL-HC) 2.5 % rectal cream   No No    Insert  into the rectum 2 (Two) Times a Day.    levothyroxine (SYNTHROID, LEVOTHROID) 50 MCG tablet   No No    Take 1 tablet by mouth Daily.    tolterodine LA (DETROL LA) 4 MG 24 hr capsule   No No    TAKE 1 CAPSULE DAILY    Patient taking differently:  QHS    Triamcinolone Acetonide (NASACORT) 55 MCG/ACT nasal inhaler   No No    2 sprays  into the nostril(s) as directed by provider Daily.        ED Medications:    Medications   ipratropium-albuterol (DUO-NEB) nebulizer solution 3 mL (3 mL Nebulization Given 6/11/21 2137)     Vital Signs:  Temp:  [97.9 °F (36.6 °C)] 97.9 °F (36.6 °C)  Heart Rate:  [69-90] 89  Resp:  [18-20] 18  BP: (116)/(84) 116/84        06/11/21 2058   Weight: 70.3 kg (155 lb)     Body mass index is 22.24 kg/m².    Physical Exam:     General Appearance:  Alert and cooperative.    Head:  Atraumatic and normocephalic.   Eyes: Conjunctivae and sclerae normal, no icterus. No pallor.   Ears:  Ears with no abnormalities noted.   Throat: No oral lesions, no thrush, oral mucosa moist.   Neck: Supple, trachea midline, no thyromegaly.   Back:   No kyphoscoliosis present. No tenderness to palpation.   Lungs:   Breath sounds heard bilaterally diminished  equally.  +  crackles  +wheezing. No Pleural rub or bronchial breathing.   Heart:  Normal S1 and S2, no murmur, no gallop, no rub. No JVD.   Abdomen:   Normal bowel sounds, no masses, no organomegaly. Soft, nontender, nondistended, no rebound tenderness.   Extremities: Supple, no edema, no cyanosis, no clubbing.   Pulses: Pulses palpable bilaterally.   Skin: No bleeding or rash.   Neurologic: Alert and oriented x 3. No facial asymmetry. Moves all four limbs. No tremors.      Laboratory data:    I have reviewed the labs done in the emergency room.    Results from last 7 days   Lab Units 06/11/21 2132   SODIUM mmol/L 125*   POTASSIUM mmol/L 4.4   CHLORIDE mmol/L 92*   CO2 mmol/L 22.8   BUN mg/dL 16   CREATININE mg/dL 0.85   CALCIUM mg/dL 8.4   BILIRUBIN mg/dL 1.6*   ALK PHOS U/L 97   ALT (SGPT) U/L 13   AST (SGOT) U/L 23   GLUCOSE mg/dL 91     Results from last 7 days   Lab Units 06/11/21 2132   WBC 10*3/mm3 5.48   HEMOGLOBIN g/dL 13.4   HEMATOCRIT % 40.7   PLATELETS 10*3/mm3 234         Results from last 7 days   Lab Units 06/11/21  2132   TROPONIN T ng/mL 0.024     Results from last 7 days    Lab Units 06/11/21  2132   PROBNP pg/mL 2,493.0*                       Invalid input(s): USDES,  BLOODU, NITRITITE, BACT, EP  Pain Management Panel     Pain Management Panel Latest Ref Rng & Units 11/29/2019    AMPHETAMINES SCREEN, URINE Negative Negative    BARBITURATES SCREEN Negative Negative    BENZODIAZEPINE SCREEN, URINE Negative Negative    BUPRENORPHINEUR Negative Negative    COCAINE SCREEN, URINE Negative Negative    METHADONE SCREEN, URINE Negative Negative    METHAMPHETAMINEUR Negative Negative          EKG:      See report and reviewed     Radiology:    XR Chest 1 View    Result Date: 6/11/2021  FINAL REPORT TECHNIQUE: An AP portable view of the chest was obtained. CLINICAL HISTORY: soa FINDINGS: There are moderately large bilateral pleural effusions obscuring the lower lobes.  The upper lung fields are clear.  The heart size cannot be determined.  There is no acute osseous abnormality.     Moderately large bilateral pleural effusions. Authenticated by Bryce Benjamin M.D. on 06/11/2021 10:15:55 PM      Assessment:    Active Hospital Problems    Diagnosis    • **Bilateral pleural effusion    • Systolic CHF, acute (CMS/HCC)    • Cognitive impairment    • Hypothyroidism    • Hypertension    • Gastroesophageal reflux disease without esophagitis    • Osteoarthritis of knee    • Male urinary stress incontinence        Plan:    Acute CHF/acute respiratory failure/pleural effusions   -requires admission for CHF    -Place on Cardiac tele monitoring  -Started IV Lasix  -Monitor In/out  -Start Oxygen therapy  -trend troponin x2   -12 lead ECG in Am  -will review 2D echo  -Additional work up planned  -elevated Bed     Additional Orders:   -continue current management  -tele  monitoring continued    -titrate pain control  -supportive managemnt continued   -will continue to follow and monitor closely   -continue checking vitals as per protocol   -continue to monitor electrolyes and renal function daily   - monitor for  any fever or chills   - repeat cbc with diff and BMp in AM   - continue DVT and GI px  - PT:pending    Risk assessment High due to multiple comorbid conditions    Ronnell Dexter MD  06/11/21  22:20 EDT    Dictated utilizing Dragon dictation.

## 2021-06-12 NOTE — PROGRESS NOTES
[facility]    Name:  Checo Michele   Age:  89 y.o.  Sex:  male  :  1925  MRN:  [id number]   Visit Number:  [id number]  Admission Date:  2021  Date Of Service:  21  Primary Care Physician:  Vermeesch, Marilyn K, MD     LOS: 1 day :    Chief Complaint:      Follow-up on shortness of breath    Subjective:    Patient seen at bedside today.  He had received a dose of Geodon prior to my arrival, very somnolent.  Daughter at bedside very helpful with history.  Discussed that he had been very congested and short of breath for over a week now.  Has been placed on antibiotic due to concern for bronchitis by PCP.  She notes he has gained weight as well in the last little bit.  Noted it has been getting harder for him at home, they do have caregiver support at home.  Has not been eating and drinking well secondary to poor dentition.    Hospital Course:    Patient is a 89-year-old gentleman with extensive medical history including hypothyroidism, hypertension, MCI, GERD, malnourishment, history of pacemaker placement in , difficulty hearing, weight loss, who had presented from home with increasing shortness of breath.  Patient had evidence of moderate to severe bilateral pleural effusions on presentation, hyponatremia, with an malnourishment.  Diagnosed with congestive heart failure, suspected diastolic congestive heart failure, echo is pending.    Review of Systems:     All systems were reviewed and negative except as mentioned in subjective, assessment and plan.    Vital Signs:    Temp:  [97.3 °F (36.3 °C)-98.5 °F (36.9 °C)] 97.3 °F (36.3 °C)  Heart Rate:  [69-92] 88  Resp:  [17-20] 18  BP: (116-129)/(70-84) 129/73    Intake and output:    No intake/output data recorded.  I/O this shift:  In: 60 [P.O.:60]  Out: -     Physical Examination:    General Appearance:   Somnolent, had received sedative prior to my arrival   Head:  Atraumatic and  normocephalic.   Eyes: Conjunctivae and sclerae normal, no icterus. No pallor.   Throat: No oral lesions, no thrush, oral mucosa moist.   Neck: Supple, trachea midline, no thyromegaly.   Lungs:    Diffuse crackles bilaterally.  Labored.   Heart:  Normal S1 and S2, no murmur, no gallop, no rub. No JVD.   Abdomen:   Normal bowel sounds, no masses, no organomegaly. Soft, nontender, nondistended, no rebound tenderness.   Extremities: Supple, no edema, no cyanosis, no clubbing.   Skin: No bleeding or rash.  Diffuse muscle atrophy   Neurologic:  Somnolent, can move extremities, there is edema of the lower extremities bilaterally     Laboratory results:    Results from last 7 days   Lab Units 06/12/21  0559 06/11/21 2132   SODIUM mmol/L 126* 125*   POTASSIUM mmol/L 3.6 4.4   CHLORIDE mmol/L 94* 92*   CO2 mmol/L 24.6 22.8   BUN mg/dL 14 16   CREATININE mg/dL 0.64* 0.85   CALCIUM mg/dL 8.3 8.4   BILIRUBIN mg/dL  --  1.6*   ALK PHOS U/L  --  97   ALT (SGPT) U/L  --  13   AST (SGOT) U/L  --  23   GLUCOSE mg/dL 76 91     Results from last 7 days   Lab Units 06/12/21  0559 06/11/21 2132   WBC 10*3/mm3 4.75 5.48   HEMOGLOBIN g/dL 13.0 13.4   HEMATOCRIT % 38.4 40.7   PLATELETS 10*3/mm3 250 234         Results from last 7 days   Lab Units 06/12/21  0559 06/11/21 2132   TROPONIN T ng/mL 0.036* 0.024     Results from last 7 days   Lab Units 06/11/21  2132 06/11/21  2128   BLOODCX  No growth at less than 24 hours No growth at less than 24 hours         I have reviewed the patient's laboratory results.    Radiology results:    XR Chest 1 View    Result Date: 6/11/2021  FINAL REPORT TECHNIQUE: An AP portable view of the chest was obtained. CLINICAL HISTORY: soa FINDINGS: There are moderately large bilateral pleural effusions obscuring the lower lobes.  The upper lung fields are clear.  The heart size cannot be determined.  There is no acute osseous abnormality.     Impression: Moderately large bilateral pleural effusions.  Authenticated by Bryce Benjamin M.D. on 06/11/2021 10:15:55 PM    I have reviewed the patient's radiology reports.    Medication Review:     I have reviewed the patient's active and prn medications.     Problem List:      Bilateral pleural effusion    Gastroesophageal reflux disease without esophagitis    Hypertension    Hypothyroidism    Male urinary stress incontinence    Osteoarthritis of knee    Cognitive impairment    Systolic CHF, acute (CMS/HCC)    Severe malnutrition (CMS/[facility])      Assessment:    1.  Suspected acute on chronic diastolic congestive heart failure  2.  Mild cognitive impairment  3.  Moderately bilateral pleural effusion  4.  Severe malnourishment   5.  Hypothyroidism  6.  Hypertension  7.  OA  8.  Failure to thrive    Plan:    It appears patient has been having a decline even from his poor baseline for the last few months, however had worsened in regards to his breathing over the last couple of weeks.  Do not feel he has an infectious process currently, this is more likely congestive heart failure, suspect diastolic.  Patient has urinated some with Lasix given thus far, will continue diuretics as much his blood pressure and renal function can tolerate.  A 2D echocardiogram has been ordered and is pending.  I reviewed his prior echo from 2020.    After discussion with patient's daughter, she did confirm patient is a DNI/DNR, and primary concern is helping the patient is much as we can, but keeping him comfortable overall.  She also noted that it has been more difficult for patient at home despite caregiver support and they were considering placement.  I discussed with her that he may not do well with his current condition based off his multiple medical comorbidities, and consideration of hospice may be an option.  Also discussed that if he declines more rapidly, consideration of inpatient hospice at [facility] could also be an option.  If he has significant improvement, he may benefit  from nursing facility placement.  She was agreeable to this plan.    DVT Prophylaxis: Lovenox  Code Status: DNR/DNI  Diet: As tolerated  Discharge Plan: To be determined    Elly Torres DO  06/12/21  14:05 EDT    Dictated utilizing Dragon dictation.

## 2021-06-12 NOTE — DISCHARGE PLACEMENT REQUEST
"Checo Nix (89 y.o. Male)     Date of Birth Social Security Number Address Home Phone MRN    [id number]  [address] [address] [phone] [id number]    Faith Marital Status          None        Admission Date Admission Type Admitting Provider Attending Provider Department, Room/Bed    21 Emergency Ronnell Dexter MD Karrick, Shandy Marcus, DO [facility] MED SURG  , 412    Discharge Date Discharge Disposition Discharge Destination                       Attending Provider: Elly Torres DO    Allergies: Ace Inhibitors, Amoxicillin-pot Clavulanate, Cefaclor, Hydrochlorothiazide, Nitrofurantoin    Isolation: None   Infection: None   Code Status: No CPR    Ht: 177.8 cm (70\")   Wt: 56 kg (123 lb 7.3 oz)    Admission Cmt: None   Principal Problem: Bilateral pleural effusion [J90]                 Active Insurance as of 2021     Primary Coverage     Payor Plan Insurance Group Employer/Plan Group    [facility] MEDICARE REPLACEMENT [facility] MEDICARE REPLACEMENT [id number]     Payor Plan Address Payor Plan Phone Number Payor Plan Fax Number Effective Dates    [address] [address]   2016 - None Entered    [facility] [id number]       Subscriber Name Subscriber Birth Date Member ID       CHECO NIX [id number]                 Emergency Contacts      (Rel.) Home Phone Work Phone Mobile Phone    Lore Ackerman (Power of ) -- -- [phone]    Lacey Nix (Daughter) [phone] -- --    ChancenicholasMini horvath (Spouse) [phone] -- [phone]               History & Physical      Ronnell Dexter MD at 21 2220              [facility] HOSPITALIST   HISTORY AND PHYSICAL      Name:  Checo Nix   Age:  89 y.o.  Sex:  male  :  1925  MRN:  [id number]   Visit Number:  [id number]  Admission Date:  2021  Date Of Service:  21  Primary Care " Physician:  Vermeesch, Marilyn K, MD    Chief Complaint: SOB         History Of Presenting Illness:    Checo Michele is a 89 y.o. male with PMH as below who presented to the ED with Sob. No cp. He is a poor historian. Sob has been worsening today. Was found with pleural effusions and suspected of acute CHF. He said his oxygen drops at home.     Patient denied fever, chills, sore throat, earache, HA, sneezing/tearing from eyes,  back pain, abdominal pain, V/D, rash, or bruising.    No alleviating or aggravating Factors.  Data and labs personally reviewed. Patient is acutely ill and needs hospitalization. Patient  will require at least  one to two midnight stays due to illness. Patient needs Further work up and acute inpatient monitoring.       Review Of Systems:    All systems were reviewed and negative except for above mentioned in HPI.   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    Past Surgical History: Patient  has a past surgical history that includes Colonoscopy w/ polypectomy; Tonsillectomy (1931); Prostatectomy; Transurethral resection of prostate; Cholecystectomy (20 years ago); Colonoscopy (15-20 years ago); Cardiac electrophysiology procedure (N/A, 11/24/2020); and Pacemaker Implantation.    Social History: Patient  reports that he has never smoked. He has never used smokeless tobacco. He reports previous alcohol use of about 3.0 standard drinks of alcohol per week. He  reports that he does not use drugs.    Family History: Patient's family history includes No Known Problems in his father and mother; Other in an other family member.    Allergies:      Ace inhibitors, Amoxicillin-pot clavulanate, Cefaclor, Hydrochlorothiazide, and Nitrofurantoin    Home Medications:    Prior to Admission Medications     Prescriptions Last Dose Informant Patient Reported? Taking?    acetaminophen (TYLENOL) 325 MG tablet   No No    Take 2 tablets by mouth Every 4 (Four) Hours As Needed for Mild Pain .    aspirin 81 MG tablet   Yes No    Take  by mouth daily.    atorvastatin (LIPITOR) 20 MG tablet   No No    TAKE 1 TABLET DAILY    candesartan (ATACAND) 4 MG tablet   No No    TAKE 1 TABLET DAILY FOR BLOOD PRESSURE    carvedilol (COREG) 12.5 MG tablet   No No    Take 1 tablet by mouth 2 (Two) Times a Day With Meals.    clonazePAM (KlonoPIN) 1 MG tablet   No No    TAKE 1 TABLET BY MOUTH EVERY EVENING AS NEEDED FOR SEIZURES    Patient taking differently:  Take 1 mg by mouth At Night As Needed.    doxycycline (DORYX) 100 MG enteric coated tablet   No No    Take 1 tablet by mouth 2 (Two) Times a Day.    escitalopram (LEXAPRO) 10 MG tablet   No No    Take 1 tablet by mouth Daily.    furosemide (LASIX) 20 MG tablet   No No    Take one tab on M, W and F    Hydrocortisone, Perianal, (ANUSOL-HC) 2.5 % rectal cream   No No    Insert  into the rectum 2 (Two) Times a Day.    levothyroxine (SYNTHROID, LEVOTHROID) 50 MCG tablet   No No    Take 1 tablet by mouth Daily.    tolterodine LA (DETROL LA) 4 MG 24 hr capsule   No No    TAKE 1 CAPSULE DAILY    Patient taking differently:  QHS    Triamcinolone Acetonide (NASACORT) 55 MCG/ACT nasal inhaler   No No    2 sprays into the nostril(s) as directed by provider Daily.        ED Medications:    Medications   ipratropium-albuterol (DUO-NEB) nebulizer solution 3 mL (3 mL Nebulization Given 6/11/21 2137)     Vital Signs:  Temp:  [97.9 °F (36.6 °C)] 97.9 °F (36.6 °C)  Heart  Rate:  [69-90] 89  Resp:  [18-20] 18  BP: (116)/(84) 116/84        06/11/21 2058   Weight: 70.3 kg (155 lb)     Body mass index is 22.24 kg/m².    Physical Exam:     General Appearance:  Alert and cooperative.    Head:  Atraumatic and normocephalic.   Eyes: Conjunctivae and sclerae normal, no icterus. No pallor.   Ears:  Ears with no abnormalities noted.   Throat: No oral lesions, no thrush, oral mucosa moist.   Neck: Supple, trachea midline, no thyromegaly.   Back:   No kyphoscoliosis present. No tenderness to palpation.   Lungs:   Breath sounds heard bilaterally diminished  equally.  +  crackles  +wheezing. No Pleural rub or bronchial breathing.   Heart:  Normal S1 and S2, no murmur, no gallop, no rub. No JVD.   Abdomen:   Normal bowel sounds, no masses, no organomegaly. Soft, nontender, nondistended, no rebound tenderness.   Extremities: Supple, no edema, no cyanosis, no clubbing.   Pulses: Pulses palpable bilaterally.   Skin: No bleeding or rash.   Neurologic: Alert and oriented x 3. No facial asymmetry. Moves all four limbs. No tremors.      Laboratory data:    I have reviewed the labs done in the emergency room.    Results from last 7 days   Lab Units 06/11/21  2132   SODIUM mmol/L 125*   POTASSIUM mmol/L 4.4   CHLORIDE mmol/L 92*   CO2 mmol/L 22.8   BUN mg/dL 16   CREATININE mg/dL 0.85   CALCIUM mg/dL 8.4   BILIRUBIN mg/dL 1.6*   ALK PHOS U/L 97   ALT (SGPT) U/L 13   AST (SGOT) U/L 23   GLUCOSE mg/dL 91     Results from last 7 days   Lab Units 06/11/21  2132   WBC 10*3/mm3 5.48   HEMOGLOBIN g/dL 13.4   HEMATOCRIT % 40.7   PLATELETS 10*3/mm3 234         Results from last 7 days   Lab Units 06/11/21  2132   TROPONIN T ng/mL 0.024     Results from last 7 days   Lab Units 06/11/21  2132   PROBNP pg/mL 2,493.0*                       Invalid input(s): USDES,  BLOODU, NITRITITE, BACT, EP  Pain Management Panel     Pain Management Panel Latest Ref Rng & Units 11/29/2019    AMPHETAMINES SCREEN, URINE Negative  Negative    BARBITURATES SCREEN Negative Negative    BENZODIAZEPINE SCREEN, URINE Negative Negative    BUPRENORPHINEUR Negative Negative    COCAINE SCREEN, URINE Negative Negative    METHADONE SCREEN, URINE Negative Negative    METHAMPHETAMINEUR Negative Negative          EKG:      See report and reviewed     Radiology:    XR Chest 1 View    Result Date: 6/11/2021  FINAL REPORT TECHNIQUE: An AP portable view of the chest was obtained. CLINICAL HISTORY: soa FINDINGS: There are moderately large bilateral pleural effusions obscuring the lower lobes.  The upper lung fields are clear.  The heart size cannot be determined.  There is no acute osseous abnormality.     Moderately large bilateral pleural effusions. Authenticated by Bryce Benjamin M.D. on 06/11/2021 10:15:55 PM      Assessment:    Active Hospital Problems    Diagnosis    • **Bilateral pleural effusion    • Systolic CHF, acute (CMS/HCC)    • Cognitive impairment    • Hypothyroidism    • Hypertension    • Gastroesophageal reflux disease without esophagitis    • Osteoarthritis of knee    • Male urinary stress incontinence        Plan:    Acute CHF/acute respiratory failure/pleural effusions   -requires admission for CHF    -Place on Cardiac tele monitoring  -Started IV Lasix  -Monitor In/out  -Start Oxygen therapy  -trend troponin x2   -12 lead ECG in Am  -will review 2D echo  -Additional work up planned  -elevated Bed     Additional Orders:   -continue current management  -tele  monitoring continued    -titrate pain control  -supportive managemnt continued   -will continue to follow and monitor closely   -continue checking vitals as per protocol   -continue to monitor electrolyes and renal function daily   - monitor for any fever or chills   - repeat cbc with diff and BMp in AM   - continue DVT and GI px  - PT:pending    Risk assessment High due to multiple comorbid conditions    Ronnell Dexter MD  06/11/21  22:20 EDT    Dictated utilizing Dragon  dictation.    Electronically signed by Ronnell Dexter MD at 06/11/21 2223         Current Facility-Administered Medications   Medication Dose Route Frequency Provider Last Rate Last Admin   • acetaminophen (TYLENOL) tablet 650 mg  650 mg Oral Q6H PRN Ronnell Dexter MD       • aspirin EC tablet 81 mg  81 mg Oral Daily Ronnell Dexter MD       • atorvastatin (LIPITOR) tablet 20 mg  20 mg Oral Daily Ronnell Dexter MD       • carvedilol (COREG) tablet 12.5 mg  12.5 mg Oral BID With Meals Ronnell Dexter MD       • clonazePAM (KlonoPIN) tablet 1 mg  1 mg Oral Nightly Ronnell Dexter MD   1 mg at 06/11/21 2324   • escitalopram (LEXAPRO) tablet 10 mg  10 mg Oral Daily Ronnell Dexter MD       • furosemide (LASIX) injection 40 mg  40 mg Intravenous Q12H Ronnell Dexter MD   40 mg at 06/12/21 1221   • ipratropium-albuterol (DUO-NEB) nebulizer solution 3 mL  3 mL Nebulization 4x Daily - RT Ronnell Dexter MD   3 mL at 06/12/21 0646   • ipratropium-albuterol (DUO-NEB) nebulizer solution 3 mL  3 mL Nebulization Q4H PRN Ronnell Dexter MD       • labetalol (NORMODYNE,TRANDATE) injection 10 mg  10 mg Intravenous Q6H PRN Ronnell Dexter MD       • nitroglycerin (NITROSTAT) SL tablet 0.4 mg  0.4 mg Sublingual Q5 Min PRN Ronnell Dexter MD       • ondansetron (ZOFRAN) injection 4 mg  4 mg Intravenous Q6H PRN Ronnell Dexter MD       • oxybutynin XL (DITROPAN-XL) 24 hr tablet 5 mg  5 mg Oral Daily Ronnell Dexter MD       • PRO-STAT oral liquid 30 mL  30 mL Oral BID Elly Torres DO       • sodium chloride 0.9 % flush 10 mL  10 mL Intravenous Q12H Ronnell Dexter MD   10 mL at 06/11/21 2325   • sodium chloride 0.9 % flush 10 mL  10 mL Intravenous PRN Ronnell Dexter MD       • sodium chloride 0.9 % flush 10 mL  10 mL Intravenous Q12H Ronnell Dexter MD   10 mL at 06/11/21 2473   • sodium chloride 0.9 % flush 10 mL  10 mL  Intravenous PRN Ronnell Dexter MD       • ziprasidone (GEODON) injection 20 mg  20 mg Intramuscular Q4H PRN Elly Torres,    20 mg at 06/12/21 0856     Physician Progress Notes (last 24 hours) (Notes from 06/11/21 1229 through 06/12/21 1229)    No notes of this type exist for this encounter.         Consult Notes (last 24 hours) (Notes from 06/11/21 1229 through 06/12/21 1229)    No notes of this type exist for this encounter.

## 2021-06-12 NOTE — THERAPY EVALUATION
PT evaluation on hold this date due to pt not being alert enough to participate in the evaluation. Nursing aware. Will plan to check back tomorrow and proceed with PT evaluation as pt is able to tolerate and as medically appropriate.

## 2021-06-12 NOTE — PROGRESS NOTES
"Adult Nutrition  Assessment/PES    Patient Name:  Checo Michele  YOB: 1925  MRN: [id number]  Admit Date:  6/11/2021    Assessment Date:  6/12/2021    Comments:    Recommend:  1. Consider Soft texture, ground, cardiac diet order as pt has limited dentition as medically appropriate and tolerated.  2. Establish and encourage PO intake. Pt refused 1 meal.  3. RD ordered Prostat BID and Compleat TID.  4. Consider a multivitamin with minerals daily.  5. Continue to monitor and replace electrolytes PRN.  6. RD completed MSA with NFPE and pt qualifies for severe malnutrition. His current BMI is 17.71.    RD to follow pt and available PRN.      Reason for Assessment     Row Name 06/12/21 1018          Reason for Assessment    Reason For Assessment  identified at risk by screening criteria     Diagnosis  cardiac disease;pulmonary disease CHF, Acute respiratory failure     Identified At Risk by Screening Criteria  BMI;difficulty chewing/swallowing           Anthropometrics     Row Name 06/12/21 1019 06/12/21 0500       Anthropometrics    Height  177.8 cm (70\")  --    Weight  --  56 kg (123 lb 7.3 oz)       Ideal Body Weight (IBW)    Ideal Body Weight (IBW) (kg)  76.48  --        Labs/Tests/Procedures/Meds     Row Name 06/12/21 1018          Labs/Procedures/Meds    Lab Results Reviewed  reviewed, pertinent     Lab Results Comments  Low: Na+, Cl-, Cr, Alb High: Total Bilirubin        Medications    Pertinent Medications Reviewed  reviewed, pertinent     Pertinent Medications Comments  Lipitor, NaCl         Physical Findings     Row Name 06/12/21 1019          Physical Findings    Overall Physical Appearance  underweight         Estimated/Assessed Needs     Row Name 06/12/21 1019          Calculation Measurements    Weight Used For Calculations  75.3 kg (166 lb) IBW     Height  177.8 cm (70\")        Estimated/Assessed Needs    Additional Documentation  Calorie Requirements (Group);KCAL/KG (Group);Protein " Requirements (Group);Fluid Requirements (Group)        Calorie Requirements    Estimated Calorie Requirement Comment  2260 - 2635        KCAL/KG    KCAL/KG  30 Kcal/Kg (kcal);35 Kcal/Kg (kcal)     30 Kcal/Kg (kcal)  2258.91     35 Kcal/Kg (kcal)  2635.395        Protein Requirements    Weight Used For Protein Calculations  75.3 kg (166 lb) IBW     Est Protein Requirement Amount (gms/kg)  2.0 gm protein 113 - 150 gm     Estimated Protein Requirements (gms/day)  150.59        Fluid Requirements    Fluid Requirements (mL/day)  2260     Estimated Fluid Requirement Method  other (see comments) 1 mL/kcal     RDA Method (mL)  2260         Nutrition Prescription Ordered     Row Name 06/12/21 1020          Nutrition Prescription PO    Current PO Diet  Regular     Common Modifiers  Cardiac         Evaluation of Received Nutrient/Fluid Intake     Row Name 06/12/21 1020          PO Evaluation    Number of Days PO Intake Evaluated  1 day     Number of Meals  1     % PO Intake  0           Malnutrition Severity Assessment     Row Name 06/12/21 1020          Malnutrition Severity Assessment    Malnutrition Type  Chronic Disease - Related Malnutrition        Muscle Loss    Loss of Muscle Mass Findings  Severe     [place]  Severe - deep hollowing/scooping, lack of muscle to touch, facial bones well defined     Clavicle Bone Region  Severe - protruding prominent bone     Acromion Bone Region  Severe - squared shoulders, bones, and acromion process protrusion prominent     Scapular Bone Region  Severe - prominent bones, depressions easily visible between ribs, scapula, spine, shoulders     Dorsal Hand Region  Severe - prominent depression     Patellar Region  Severe - prominent bone, square looking, very little muscle definition     Anterior Thigh Region  Severe - line/depression along thigh, obviously thin     Posterior Calf Region  Severe - thin with very little definition/firmness        Fat Loss    Subcutaneous Fat Loss  Findings  Severe     Orbital Region   Severe - pronounced hollowness/depression, dark circles, loose saggy skin     Upper Arm Region  Severe - mostly skin, very little space between folds, fingers touch     Thoracic & Lumbar Region  Severe - ribs visible with prominent depressions, iliac crest very prominent        Fluid Accumulation (Edema)    Fluid Acumulation Findings  Mild In left foot slight swelling        Declining Functional Status    Declining Functional Status Findings  Measurably Reduced        Criteria Met (Must meet criteria for severity in at least 2 of these categories: M Wasting, Fat Loss, Fluid, Secondary Signs, Wt. Status, Intake)    Patient meets criteria for   Severe Malnutrition           Problem/Interventions:  Problem 1     Row Name 06/12/21 1022          Nutrition Diagnoses Problem 1    Problem 1  Malnutrition     Etiology (related to)  Factors Affecting Nutrition     Food Habit/Preferences  Small Meals     Oral  Limited Dentition     Signs/Symptoms (evidenced by)  BMI     BMI  17 - 17.9         Problem 2     Row Name 06/12/21 1022          Nutrition Diagnoses Problem 2    Problem 2  Increased Nutrient Needs     Macronutrient  Kcal;Protein;Fat;Carbohydrate     Etiology (related to)  Medical Diagnosis     Nutrition related  -- Malnutrition     Signs/Symptoms (evidenced by)  BMI     BMI  17 - 17.9             Intervention Goal     Row Name 06/12/21 1023          Intervention Goal    General  Meet nutritional needs for age/condition;Improved nutrition related lab(s)     PO  Meet estimated needs;Increase intake;PO intake (%);Establish PO     PO Intake %  50 %     Weight  Appropriate weight gain         Nutrition Intervention     Row Name 06/12/21 1023          Nutrition Intervention    RD/Tech Action  Follow Tx progress;Encourage intake;Recommend/ordered     Recommended/Ordered  Supplement         Nutrition Prescription     Row Name 06/12/21 1023          Nutrition Prescription PO    PO  Prescription  Begin/change supplement;Other (comment) Continue current diet order as medically appropriate and tolerated     Supplement  Other (comment);PRO liquid Compleat carton     Supplement Frequency  3 times a day;2 times a day     New PO Prescription Ordered?  Yes Supplements ordered        Other Orders    Obtain Weight  Daily     Obtain Weight Ordered?  No, recommended     Supplement  Vitamin mineral supplement     Supplement Ordered?  No, recommended     Other  Continue to monitor and replace electrolytes PRN         Education/Evaluation     Row Name 06/12/21 1024          Education    Education  Education not appropriate at this time     Please explain  Patient confusion        Monitor/Evaluation    Monitor  Per protocol;I&O;PO intake;Supplement intake;Pertinent labs;Weight;Skin status           Electronically signed by:  Krystle Correa RD  06/12/21 10:24 EDT

## 2021-06-12 NOTE — ED PROVIDER NOTES
Subjective   History of Present Illness    89-year-old male presents to ER by EMS from home for evaluation of breathing difficulty.  Patient is not sure why he is here.  Patient reportedly had low oxygen saturations while he is lying down at home but these improved to within normal range after he was sat up.  Patient had an IV placed by EMS.  Patient was noted by the RN when triaging that he had very coarse breath sounds bilaterally.  Again patient denies any complaints.  History limited by advanced age and poor historian  Review of Systems   Reason unable to perform ROS: Advanced age and poor historian.       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)    • Sleep apnea    • Spermatocele    • Syncope, near    • URTI (acute upper respiratory infection)        Allergies   Allergen Reactions   • Ace Inhibitors    • Amoxicillin-Pot Clavulanate    • Cefaclor    • Hydrochlorothiazide    • Nitrofurantoin        Past Surgical History:   Procedure Laterality Date   • CARDIAC ELECTROPHYSIOLOGY PROCEDURE N/A 11/24/2020    Procedure: DEVICE IMPLANT;  Surgeon: Julius Chatman DO;  Location: [facility] INVASIVE LOCATION;  Service: Cardiology;  Laterality: N/A;  MICRA   • CHOLECYSTECTOMY  20 years ago   • COLONOSCOPY  15-20 years ago   • COLONOSCOPY W/ POLYPECTOMY      Dr Toledo   • CYSTOSCOPY TRANSURETHRAL RESECTION OF PROSTATE     • PACEMAKER IMPLANTATION     • PROSTATECTOMY      non cancer, Dr Toledo   • TONSILLECTOMY  1931       Family History   Problem Relation Age of Onset   • Other Other         STROKE SYNDROME   • No Known Problems Mother    • No Known Problems Father    • Colon cancer Neg Hx        Social History     Socioeconomic  History   • Marital status:      Spouse name: Not on file   • Number of children: Not on file   • Years of education: Not on file   • Highest education level: Not on file   Tobacco Use   • Smoking status: Never Smoker   • Smokeless tobacco: Never Used   Vaping Use   • Vaping Use: Never used   Substance and Sexual Activity   • Alcohol use: Not Currently     Alcohol/week: 3.0 standard drinks     Types: 3 Cans of beer per week   • Drug use: No   • Sexual activity: Defer           Objective   Physical Exam  Vitals and nursing note reviewed.   Constitutional:       Appearance: He is well-developed.      Comments: Alert, nontoxic-appearing, no acute distress   HENT:      Head: Normocephalic and atraumatic.      Mouth/Throat:      Mouth: Mucous membranes are moist.   Cardiovascular:      Rate and Rhythm: Normal rate and regular rhythm.   Pulmonary:      Effort: Pulmonary effort is normal.      Comments: Coarse breath sounds bilaterally.  Airway congestion.  Abdominal:      Palpations: Abdomen is soft.      Tenderness: There is no abdominal tenderness. There is no guarding.   Musculoskeletal:         General: Normal range of motion.      Cervical back: Normal range of motion.      Comments: 2+ pitting edema bilateral ankles and feet   Skin:     General: Skin is warm and dry.      Capillary Refill: Capillary refill takes less than 2 seconds.   Neurological:      General: No focal deficit present.      Mental Status: He is alert.   Psychiatric:         Mood and Affect: Mood normal.         Procedures           ED Course  ED Course as of Jun 11 2218 Fri Jun 11, 2021 2118 BP: 116/84 [CS]   2118 Temp: 97.9 °F (36.6 °C) [CS]   2118 Heart Rate: 87 [CS]   2118 Resp: 20 [CS]   2118 SpO2: 95 % [CS]   2118 Not hypoxic   Device (Oxygen Therapy): room air [CS]   2118 EKG interpreted by me.  Time 2106.  Rate 89.  Undetermined rhythm.  Left axis deviation.  Nonspecific ST and T wave abnormality    [CS]   2216  Procalcitonin   06/11 2132    Procalcitonin 0.07  Magnesium   06/11 2132    Magnesium 1.9  CBC & Differential   06/11 2132    WBC 5.48  RBC 4.43  Hemoglobin 13.4  Hematocrit 40.7  MCV 91.9  MCH 30.2  MCHC 32.9  RDW 14.0  RDW-SD 47.7  MPV 10.6  Platelets 234  Neutrophil Rel % 71.5  Lymphocyte Rel % 15.5  Monocyte Rel % 8.6  Eosinophil Rel % 3.3  Basophil Rel % 0.7  Immature Granulocyte Rel % 0.4  Neutrophils Absolute 3.92  Lymphocytes Absolute 0.85  Monocytes Absolute 0.47  Eosinophils Absolute 0.18  Basophils Absolute 0.04  Immature Grans, Absolute 0.02  nRBC 0.0     BNP   06/11 2132    proBNP 2,493.0    Lactic Acid, Plasma   06/11 2132    Lactate 1.8  Blood Culture With NESTOR - Blood, Arm, Left   In process 06/11 2132  Blood Culture With NESTOR - Blood, Arm, Left   In process 06/11 2128 Troponin   06/11 2132    Troponin T 0.024  Comprehensive Metabolic Panel   06/11 2132    Glucose 91  BUN 16  Creatinine 0.85  Sodium 125  Potassium 4.4  Chloride 92  CO2 22.8  Calcium 8.4  Total Protein 6.2  Albumin 3.30  ALT (SGPT) 13  AST (SGOT) 23  Alkaline Phosphatase 97  Total Bilirubin 1.6  eGFR Non  Am 84  Globulin 2.9  A/G Ratio 1.1  BUN/Creatinine Ratio 18.8  Anion Gap 10.2          [CS]      ED Course User Index  [CS] Pablo Moy MD                                           [facility]     Chest x-ray shows moderate to large bilateral pleural effusions.  These are new since earlier this year.  Possibly congestive failure.  Uncertain etiology however.  I think patient would benefit from admission for diuresis and thoracentesis.  Discussed with hospitalist Dr. Tim.  He agrees to admit.    Final diagnoses:   Bilateral pleural effusion       ED Disposition  ED Disposition     ED Disposition Condition Comment    Decision to Admit  Level of Care: Telemetry [5]   Diagnosis: Bilateral pleural effusion [843978]   Admitting Physician: VALERIA TIM [[id number]]   Certification: I Certify That Inpatient Hospital  Services Are Medically Necessary For Greater Than 2 Midnights            No follow-up provider specified.       Medication List      No changes were made to your prescriptions during this visit.          Pablo Moy MD  06/11/21 7040

## 2021-06-12 NOTE — PLAN OF CARE
Goal Outcome Evaluation:  Plan of Care Reviewed With: patient        Progress: no change  Outcome Summary: VSS. RA. No complaints of SOA. Pt is disoriented to situation. Will continue to monitor. Skin and fall precautions in place.

## 2021-06-12 NOTE — PROGRESS NOTES
Discharge Planning Assessment  [facility]     Patient Name: Checo Michele  MRN: [id number]  Today's Date: 6/12/2021    Admit Date: 6/11/2021    Discharge Needs Assessment     Row Name 06/12/21 1219       Living Environment    Lives With  spouse;child(julianne), adult    Name(s) of Who Lives With Patient  Daughter Lacey     and spouse    Current Living Arrangements  home/apartment/condo    Potentially Unsafe Housing Conditions  -- no issues    Primary Care Provided by  child(julianne)    Family Caregiver if Needed  child(julianne), adult    Family Caregiver Names  Lacey Michele   or Lore Ackerman       Resource/Environmental Concerns    Resource/Environmental Concerns  none    Transportation Concerns  other (see comments) needs EMS       Transition Planning    Patient/Family Anticipates Transition to  inpatient hospice;long-term care facility    Patient/Family Anticipated Services at Transition  none    Transportation Anticipated  -- will need EMS       Discharge Needs Assessment    Readmission Within the Last 30 Days  no previous admission in last 30 days    Current Outpatient/Agency/Support Group  homecare agency    Equipment Currently Used at Home  walker, standard;cane, straight    Equipment Needed After Discharge  oxygen;hospital bed    Discharge Facility/Level of Care Needs  hospice facility;nursing facility, skilled    Provided Post Acute Provider List?  Yes    Post Acute Provider List  Inpatient Rehab    Delivered To  Support Person    Method of Delivery  In person    Patient's Choice of Community Agency(s)  [facility], then the [facility]        Discharge Plan     Row Name 06/12/21 1223       Plan    Plan  Skill nursing facility versus compassionate care center    Plan Comments  mostly dependent on family for ADl's, able to feed self  needs help with dressing able to use a walker, lives wih spouse who just got out of [facility] two weeks ago  and daughter  Janey   other daughter Lore  also assist and is   POA,  states  they have been trying to get  both parents 24hour caregivers  also have been trying to get him into  [place].     [facility]  [facility]  2nd choice  [facility] in [place],    discussed insurance may not pay for rehab  states she understood and they would pay private., no home oxygen,  currently has [facility] [place]  has advance directives and living will on file  will need EMS tranport        Continued Care and Services - Admitted Since 6/11/2021    Coordination has not been started for this encounter.     Selected Continued Care - Episodes Includes selections from active Coordinated Care Management episodes    High-Risk Care Management Episode start date: 5/17/2021   There are no active outsourced providers for this episode.                 Demographic Summary     Row Name 06/12/21 1218       General Information    Admission Type  inpatient    Referral Source  admission list    Reason for Consult  discharge planning    Preferred Language  English    General Information Comments  lionel Ackerman  [employer]  health care surrogate  papers on file.        Functional Status     Row Name 06/12/21 1218       Functional Status    Usual Activity Tolerance  poor    Current Activity Tolerance  poor       Functional Status, IADL    Medications  completely dependent    Meal Preparation  completely dependent    Housekeeping  completely dependent    Laundry  completely dependent    Shopping  completely dependent       Mental Status Summary    Mental Status Comments  patient  very sleep not waking up to answer questions       Employment/    Employment Status  retired        Psychosocial    No documentation.       Abuse/Neglect    No documentation.       Legal    No documentation.       Substance Abuse    No documentation.       Patient Forms    No documentation.           Liliana Bell RN

## 2021-06-13 NOTE — PLAN OF CARE
Problem: Adult Inpatient Plan of Care  Goal: Plan of Care Review  Outcome: Ongoing, Progressing  Flowsheets (Taken 6/13/2021 0808)  Outcome Summary: rested well daughter at bedside   Goal Outcome Evaluation:              Outcome Summary: rested well daughter at bedside

## 2021-06-13 NOTE — PLAN OF CARE
Goal Outcome Evaluation:  Plan of Care Reviewed With: patient, daughter           Outcome Summary: PT evaluation completed this date with pt keeping eyes shut for most of session, but did follow commands to assist with bed mobilty, sitting balance, and a trial for sit to stand (max assist provided with B knees blocked). Pt's deficits include decreased balance, endurance, and strength. He is expected to improve his functional mobility with continued PT services prior to d/c.

## 2021-06-13 NOTE — PROGRESS NOTES
[facility]    PROGRESS NOTE    Name:  Checo Michele   Age:  89 y.o.  Sex:  male  :  1925  MRN:  [id number]   Visit Number:  [id number]  Admission Date:  2021  Date Of Service:  21  Primary Care Physician:  Vermeesch, Marilyn K, MD     LOS: 2 days :    Chief Complaint:      Follow-up on shortness of breath    Subjective:    Patient seen at bedside, 2 daughters at bedside.  No acute changes noted overnight.  It appears he may have gotten agitated again and he was given some anxiety medicine for that.  He is arousable at this point, very hard of hearing.  Daughter did note he had a few bites yesterday evening of food.  He has had frequent urination with the Lasix.  Lower extremity swelling is improved.    Hospital Course:    Patient is a 89-year-old gentleman with extensive medical history including hypothyroidism, hypertension, MCI, GERD, malnourishment, history of pacemaker placement in , difficulty hearing, weight loss, who had presented from home with increasing shortness of breath.  Patient had evidence of moderate to severe bilateral pleural effusions on presentation, hyponatremia, with an malnourishment.  Diagnosed with congestive heart failure, suspected systolic versus diastolic congestive heart failure, echo is pending.  Also with failure to thrive    Review of Systems:     All systems were reviewed and negative except as mentioned in subjective, assessment and plan.    Vital Signs:    Temp:  [97.2 °F (36.2 °C)-98.3 °F (36.8 °C)] 97.6 °F (36.4 °C)  Heart Rate:  [79-88] 82  Resp:  [16-18] 16  BP: ()/(55-86) 133/86    Intake and output:    I/O last 3 completed shifts:  In: 290 [P.O.:290]  Out: -   No intake/output data recorded.    Physical Examination:    General Appearance:   Somnolent, once again had received a sedative overnight.   Head:  Atraumatic and normocephalic.   Eyes: Conjunctivae and sclerae normal, no icterus. No pallor.    Throat: No oral lesions, no thrush, oral mucosa moist.   Neck: Supple, trachea midline, no thyromegaly.   Lungs:    Radicles through the bases bilaterally.  No significant wheezing today.   Heart:  Normal S1 and S2, no murmur, no gallop, no rub. No JVD.   Abdomen:   Normal bowel sounds, no masses, no organomegaly. Soft, nontender, nondistended, no rebound tenderness.   Extremities: Supple, no edema, no cyanosis, no clubbing.   Skin: No bleeding or rash.  Diffuse muscle atrophy   Neurologic:  Somnolent, can move extremities, there is edema of the lower extremities bilaterally, albeit improved since yesterday     Laboratory results:    Results from last 7 days   Lab Units 06/13/21  0611 06/12/21  0559 06/11/21  2132   SODIUM mmol/L 132* 126* 125*   POTASSIUM mmol/L 3.9 3.6 4.4   CHLORIDE mmol/L 95* 94* 92*   CO2 mmol/L 26.9 24.6 22.8   BUN mg/dL 14 14 16   CREATININE mg/dL 0.84 0.64* 0.85   CALCIUM mg/dL 8.3 8.3 8.4   BILIRUBIN mg/dL  --   --  1.6*   ALK PHOS U/L  --   --  97   ALT (SGPT) U/L  --   --  13   AST (SGOT) U/L  --   --  23   GLUCOSE mg/dL 79 76 91     Results from last 7 days   Lab Units 06/12/21  0559 06/11/21  2132   WBC 10*3/mm3 4.75 5.48   HEMOGLOBIN g/dL 13.0 13.4   HEMATOCRIT % 38.4 40.7   PLATELETS 10*3/mm3 250 234         Results from last 7 days   Lab Units 06/12/21  1346 06/12/21  0559 06/11/21  2132   TROPONIN T ng/mL 0.019 0.036* 0.024     Results from last 7 days   Lab Units 06/11/21  2132 06/11/21 2128   BLOODCX  No growth at 24 hours No growth at 24 hours         I have reviewed the patient's laboratory results.    Radiology results:    XR Chest 1 View    Result Date: 6/11/2021  FINAL REPORT TECHNIQUE: An AP portable view of the chest was obtained. CLINICAL HISTORY: soa FINDINGS: There are moderately large bilateral pleural effusions obscuring the lower lobes.  The upper lung fields are clear.  The heart size cannot be determined.  There is no acute osseous abnormality.     Impression:  Moderately large bilateral pleural effusions. Authenticated by Bryce Benjamin M.D. on 06/11/2021 10:15:55 PM    I have reviewed the patient's radiology reports.    Medication Review:     I have reviewed the patient's active and prn medications.     Problem List:      Bilateral pleural effusion    Gastroesophageal reflux disease without esophagitis    Hypertension    Hypothyroidism    Male urinary stress incontinence    Osteoarthritis of knee    Cognitive impairment    Systolic CHF, acute (CMS/HCC)    Severe malnutrition (CMS/HCC)      Assessment:    1.  Suspected acute on chronic diastolic versus systolic congestive heart failure  2.  Mild cognitive impairment  3.  Moderately bilateral pleural effusion  4.  Severe malnourishment   5.  Hypothyroidism  6.  Hypertension  7.  OA  8.  Failure to thrive    Plan:    Mild hyponatremia has improved with diuresis.  Patient does sound better on exam today.  Still with agitation noted primarily overnight.  Discussed trial of low-dose of Seroquel with daughters at bedside.  Will try to avoid any benzodiazepines.  Encourage oral intake is much as tolerated.  Discussed having hospice physician talk with him tomorrow, they were both agreeable with this.  We will repeat a chest x-ray tomorrow.  2D echocardiogram is pending.    Patient's prognosis is overall poor based off of failure to thrive, congestive heart failure, malnourishment, confusion.  Family is not seeking any aggressive measures at this point per patient's wishes.    DVT Prophylaxis: Lovenox  Code Status: DNR/DNI  Diet: As tolerated  Discharge Plan: To be determined    Elly Torres DO  06/13/21  13:55 EDT    Dictated utilizing Dragon dictation.

## 2021-06-13 NOTE — THERAPY EVALUATION
Patient Name: Checo Michele  : 1925    MRN: [id number]                              Today's Date: 2021       Admit Date: 2021    Visit Dx:     ICD-10-CM ICD-9-CM   1. Bilateral pleural effusion  J90 511.9     Patient Active Problem List   Diagnosis   • Gastroesophageal reflux disease without esophagitis   • Hyperlipidemia   • Hypertension   • Hypothyroidism   • Male urinary stress incontinence   • Osteoarthritis of knee   • Temporary cerebral vascular dysfunction   • Constipation   • Frequent use of laxatives   • Medicare annual wellness visit, subsequent   • Hypertensive encephalopathy   • Primary insomnia   • Episode of recurrent major depressive disorder (CMS/HCC)   • Bradycardia   • Complete heart block (CMS/HCC)   • Grade III hemorrhoids   • Frequent falls   • Cognitive impairment   • Environmental allergies   • Bronchitis   • Bilateral pleural effusion   • Systolic CHF, acute (CMS/HCC)   • Severe malnutrition (CMS/HCC)     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)    • Sleep apnea    • Spermatocele    • Syncope, near    • URTI (acute upper respiratory infection)      Past Surgical History:   Procedure Laterality Date   • CARDIAC ELECTROPHYSIOLOGY PROCEDURE N/A 2020    Procedure: DEVICE IMPLANT;  Surgeon: Julius Chatman DO;  Location: [facility] INVASIVE LOCATION;  Service: Cardiology;  Laterality: N/A;  MICRA   • CHOLECYSTECTOMY  20 years ago   • COLONOSCOPY  15-20 years ago   • COLONOSCOPY W/ POLYPECTOMY      Dr Toledo   • CYSTOSCOPY TRANSURETHRAL RESECTION OF PROSTATE     • PACEMAKER IMPLANTATION     • PROSTATECTOMY      non cancer, Dr Toledo   • TONSILLECTOMY       General  Information     Row Name 06/13/21 [facility]          Physical Therapy Time and Intention    Document Type  evaluation  -TW     Mode of Treatment  physical therapy  -TW     Row Name 06/13/21 1203          General Information    Patient Profile Reviewed  yes  -TW     Prior Level of Function  mod assist:;all household mobility pt has 24 caregivers. Pt has had a decline in his ability to ambulate and assist with transfers. His rolling walker has been getting away from him per his daughter. Pt has a transport chair, st walker and rolling walker.  -TW     Existing Precautions/Restrictions  fall;oxygen therapy device and L/min;pacemaker;seizures  -TW     Barriers to Rehab  previous functional deficit;medically complex;cognitive status;hearing deficit  -TW     Row Name 06/13/21 1203          Living Environment    Lives With  spouse;child(julianne), adult  -TW     Row Name 06/13/21 1203          Home Main Entrance    Number of Stairs, Main Entrance  six  -TW     Stair Railings, Main Entrance  railings on both sides of stairs  -TW     Row Name 06/13/21 1203          Stairs Within Home, Primary    Stairs, Within Home, Primary  Per pt's daughter it has been very challenging to get pt in/out home with the 6 steps.  -TW     Number of Stairs, Within Home, Primary  none  -TW     Row Name 06/13/21 1203          Cognition    Orientation Status (Cognition)  disoriented to;person;unable/difficult to assess  -     Row Name 06/13/21 1203          Safety Issues, Functional Mobility    Safety Issues Affecting Function (Mobility)  friction/shear risk;safety precaution awareness;safety precautions follow-through/compliance;insight into deficits/self-awareness  -TW     Impairments Affecting Function (Mobility)  balance;postural/trunk control;endurance/activity tolerance;strength;cognition  -TW     Cognitive Impairments, Mobility Safety/Performance  attention;insight into deficits/self-awareness;safety precaution awareness;safety precaution  follow-through;other (see comments)  -     Comment, Safety Issues/Impairments (Mobility)  Note that along with decreased strength, pt is noted to have mm atrophy of all 4 extremities. Pt did keep eyes shut during most of evaluation but did follow some commands to assist with mobility.  -       User Key  (r) = Recorded By, (t) = Taken By, (c) = Cosigned By    Initials Name Provider Type    TW Alicia Cooper, PT Physical Therapist        Mobility     Row Name 06/13/21 1203          Bed Mobility    Bed Mobility  scooting/bridging;supine-sit;sit-supine  -TW     Scooting/Bridging [place] (Bed Mobility)  maximum assist (25% patient effort);verbal cues;nonverbal cues (demo/gesture)  -TW     Supine-Sit [place] (Bed Mobility)  maximum assist (25% patient effort);verbal cues;nonverbal cues (demo/gesture)  -TW     Sit-Supine [place] (Bed Mobility)  moderate assist (50% patient effort)  -     Assistive Device (Bed Mobility)  draw sheet;head of bed elevated  -TW     Comment (Bed Mobility)  Pt very lethargic when therapist first entered room. Pt did wake up some, but kept eyes shut for most of session. Pt did assist with raising trunk up to have pillow placed behind him and helped get LE's to EOB to sit up.  -     Row Name 06/13/21 1203          Transfers    Comment (Transfers)  Pt did assist with pushing through LE's for brieft sit to stand trial to have bed linens under changed. B knees blocked and gait belt in use.  -     Row Name 06/13/21 1203          Bed-Chair Transfer    Bed-Chair [place] (Transfers)  not tested  -     Row Name 06/13/21 [facility]          Sit-Stand Transfer    Sit-Stand [place] (Transfers)  maximum assist (25% patient effort)  -     Assistive Device (Sit-Stand Transfers)  other (see comments)  -     Row Name 06/13/21 1203          Gait/Stairs (Locomotion)    [place] Level (Gait)  not tested  -       User Key  (r) = Recorded By, (t) = Taken By, (c) = Cosigned By     Initials Name Provider Type    TW Alicia Cooper, [place] Physical Therapist        Obj/Interventions     Row Name 06/13/21 1203          Range of Motion Comprehensive    Comment, General Range of Motion  BLE grossly WFLS  -TW     Row Name 06/13/21 1203          Strength Comprehensive (MMT)    Comment, General Manual Muscle Testing (MMT) Assessment  Pt unable to follow commands for MMT but did use LE's to try to assist with sit to stand and to bring LEs to EOB.  -TW     Row Name 06/13/21 1203          Balance    Balance Assessment  sitting static balance;sitting dynamic balance;standing static balance  -TW     Static Sitting Balance  moderate impairment;mild impairment;supported;unsupported;sitting, edge of bed  -TW     Dynamic Sitting Balance  moderate impairment;severe impairment;supported  -TW     Static Standing Balance  severe impairment;supported  -TW     Comment, Balance  pt sat on EOB x 8.5 min initiallly with mod assist to maintain. Therapist kept removing assist and providing cues for pt to maintain own balance and for 20-25 sec at a time pt was able maintain with close SBA before needing assist back to midline as he leaned posteriorly. Pt also able to cough several times in sitting.  -TW       User Key  (r) = Recorded By, (t) = Taken By, (c) = Cosigned By    Initials Name Provider Type    TW Alicia Cooper, PT Physical Therapist        Goals/Plan     Row Name 06/13/21 1203          Bed Mobility Goal 1 (PT)    Activity/Assistive Device (Bed Mobility Goal 1, PT)  bed mobility activities, all  -TW     [place] Level/Cues Needed (Bed Mobility Goal 1, PT)  contact guard assist  -TW     Time Frame (Bed Mobility Goal 1, PT)  2 weeks  -TW     Strategies/Barriers (Bed Mobility Goal 1, PT)  Barrier: Previous functional deficits  -TW     Progress/Outcomes (Bed Mobility Goal 1, PT)  goal ongoing  -TW     Row Name 06/13/21 1203          Transfer Goal 1 (PT)    Activity/Assistive Device (Transfer Goal 1, PT)   sit-to-stand/stand-to-sit;bed-to-chair/chair-to-bed;toilet;walker, rolling  -TW     [place] Level/Cues Needed (Transfer Goal 1, PT)  minimum assist (75% or more patient effort)  -TW     Time Frame (Transfer Goal 1, PT)  2 weeks  -TW     Strategies/Barriers (Transfers Goal 1, PT)  Barrier: Previous functional deficits  -TW     Progress/Outcome (Transfer Goal 1, PT)  goal ongoing  -TW     Row Name 06/13/21 1203          Gait Training Goal 1 (PT)    Activity/Assistive Device (Gait Training Goal 1, PT)  gait (walking locomotion);decrease fall risk;walker, rolling;assistive device use  -TW     [place] Level (Gait Training Goal 1, PT)  minimum assist (75% or more patient effort)  -TW     Distance (Gait Training Goal 1, PT)  30 ft  -TW     Time Frame (Gait Training Goal 1, PT)  2 weeks  -TW     Strategies/Barriers (Gait Training Goal 1, PT)  Barrier: Previous functional deficits  -TW     Progress/Outcome (Gait Training Goal 1, PT)  goal ongoing  -TW     Row Name 06/13/21 1203          Patient Education Goal (PT)    Activity (Patient Education Goal, PT)  LE ther ex 1 x 10  -TW     [place]/Cues/Accuracy (Memory Goal 2, PT)  demonstrates adequately  -TW     Time Frame (Patient Education Goal, PT)  1 week;short term goal (STG)  -TW     Progress/Outcome (Patient Education Goal, PT)  goal ongoing  -TW       User Key  (r) = Recorded By, (t) = Taken By, (c) = Cosigned By    Initials Name Provider Type    TW Alicia Cooper, PT Physical Therapist        Clinical Impression     Row Name 06/13/21 1203          Pain    Additional Documentation  Pain Scale: Numbers Pre/Post-Treatment (Group);Pain Scale: FACES Pre/Post-Treatment (Group)  -TW     Row Name 06/13/21 1203          Pain Scale: Numbers Pre/Post-Treatment    Pretreatment Pain Rating  0/10 - no pain  -TW     Posttreatment Pain Rating  0/10 - no pain  -TW     Row Name 06/13/21 1203          Pain Scale: FACES Pre/Post-Treatment    Pain: FACES Scale, Pretreatment   0-->no hurt  -TW     Posttreatment Pain Rating  0-->no hurt  -TW     Row Name 06/13/21 1203          Plan of Care Review    Plan of Care Reviewed With  patient;daughter  -TW     Outcome Summary  PT evaluation completed this date with pt keeping eyes shut for most of session, but did follow commands to assist with bed mobilty, sitting balance, and a trial for sit to stand (max assist provided with B knees blocked). Pt's deficits include decreased balance, endurance, and strength. He is expected to improve his functional mobility with continued PT services prior to d/c.  -TW     Row Name 06/13/21 1203          Therapy Assessment/Plan (PT)    Patient/Family Therapy Goals Statement (PT)  Per daughter if pt can do rehab she loved to see pt try that.  -TW     Rehab Potential (PT)  good, to achieve stated therapy goals  -TW     Criteria for Skilled Interventions Met (PT)  yes;meets criteria  -TW     Predicted Duration of Therapy Intervention (PT)  2 wks  -TW     Row Name 06/13/21 1203          Vital Signs    Pre SpO2 (%)  93  -TW     O2 Delivery Pre Treatment  supplemental O2 2 L  -TW     Post SpO2 (%)  92  -TW     O2 Delivery Post Treatment  supplemental O2 2 L  -TW     Pre Patient Position  Side Lying  -TW     Intra Patient Position  Sitting  -TW     Post Patient Position  Supine  -TW     Row Name 06/13/21 1203          Positioning and Restraints    Pre-Treatment Position  in bed  -TW     Post Treatment Position  bed  -TW     In Bed  fowlers;call light within reach;encouraged to call for assist;exit alarm on;side rails up x3  -TW       User Key  (r) = Recorded By, (t) = Taken By, (c) = Cosigned By    Initials Name Provider Type    TW Alicia Cooper, PT Physical Therapist        Outcome Measures     Row Name 06/13/21 1203          How much help from another person do you currently need...    Turning from your back to your side while in flat bed without using bedrails?  2  -TW     Moving from lying on back to sitting on the  side of a flat bed without bedrails?  2  -TW     Moving to and from a bed to a chair (including a wheelchair)?  1  -TW     Standing up from a chair using your arms (e.g., wheelchair, bedside chair)?  1  -TW     Climbing 3-5 steps with a railing?  1  -TW     To walk in hospital room?  1  -TW     AM-PAC 6 Clicks Score (PT)  8  -TW     Row Name 06/13/21 1203          Functional Assessment    Outcome Measure Options  AM-PAC 6 Clicks Basic Mobility (PT)  -TW       User Key  (r) = Recorded By, (t) = Taken By, (c) = Cosigned By    Initials Name Provider Type    TW Alicia Cooper PT Physical Therapist        Physical Therapy Education                 Title: PT OT SLP Therapies (In Progress)     Topic: Physical Therapy (In Progress)     Point: Mobility training (Done)     Learning Progress Summary           Patient Acceptance, E,D, DU,NR by TW at 6/13/2021 1203    Comment: Pt education began for improving bed mobility technique and sitting balance technique on EOB.                   Point: Home exercise program (Not Started)     Learner Progress:  Not documented in this visit.          Point: Body mechanics (Not Started)     Learner Progress:  Not documented in this visit.          Point: Precautions (Not Started)     Learner Progress:  Not documented in this visit.                      User Key     Initials Effective Dates Name Provider Type Discipline    TW 03/26/19 -  Alicia Cooper PT Physical Therapist PT              PT Recommendation and Plan  Planned Therapy Interventions (PT): balance training, bed mobility training, gait training, home exercise program, patient/family education, transfer training, strengthening  Plan of Care Reviewed With: patient, daughter  Outcome Summary: PT evaluation completed this date with pt keeping eyes shut for most of session, but did follow commands to assist with bed mobilty, sitting balance, and a trial for sit to stand (max assist provided with B knees blocked). Pt's deficits include  decreased balance, endurance, and strength. He is expected to improve his functional mobility with continued PT services prior to d/c.     Time Calculation:   PT Charges     Row Name 06/13/21 1203             Time Calculation    Stop Time  1203  -TW      PT Received On  06/13/21 -TW      PT Goal Re-Cert Due Date  06/23/21 -TW        User Key  (r) = Recorded By, (t) = Taken By, (c) = Cosigned By    Initials Name Provider Type    TW Alicia Cooper PT Physical Therapist        Therapy Charges for Today     Code Description Service Date Service Provider Modifiers Qty    [id number] HC PT EVAL LOW COMPLEXITY 3 6/13/2021 Alicia Cooper PT GP 1          PT G-Codes  Outcome Measure Options: AM-PAC 6 Clicks Basic Mobility (PT)  AM-PAC 6 Clicks Score (PT): 8    Alicia Cooper PT  6/13/2021

## 2021-06-14 NOTE — PLAN OF CARE
Pt presents to the hospital with c/o SOB.    Admit dx:  CAD; Pacemaker; CHF    Admit dx:  Pleural effusions, bilateral; Acute CHF    Code status:  DNR/DNI    Living Will/Heathcare Surrogate paperwork on chart    Dr. Uribe and I visited pt in his room; his daughter (Lore) and there  are at bedside.  Pt is sitting up in the bedside recliner sleeping.  He appeared to be comfortable.  Lore reports improvement in patient today and she states that he has been eating all his food today.  Conversation was then continued in the 4th floor waiting area.    Lore reports that prior to admission, she has been looking for LTC placement for her parents.  She states that he has been declining for several months.  More recently, she reports that he has been falling, requiring a walker to ambulate and had decreased eating.  She confirms that pt's wishes were to not having any aggressive treatment.  She reports knowing that pt is declining and that he is not going to improve.  LTC conversation had re CCC vs ST rehab vs LTC with hospice.  Lore reports that she wants to focus on comfort but would like pt to be able to still participate in activities and visiting family.  Dr. Uribe explained in great detail the criteria for hospice CCC and that unfortunately pt did not appear to meet criteria.  Lore reports that she would like to pursue placement and selects [facility] as her first choice.  KADEN Salazar updated and has made referral to [facility] for pt and his wife.  Palliative services will continue to follow and plan to f/u with pt and/or family tomorrow.  If pt has a decline in condition, he may be appropriate for the CCC.

## 2021-06-14 NOTE — THERAPY EVALUATION
Patient Name: Checo Michele  : 1925    MRN: [id number]                              Today's Date: 2021       Admit Date: 2021    Visit Dx:     ICD-10-CM ICD-9-CM   1. Bilateral pleural effusion  J90 511.9     Patient Active Problem List   Diagnosis   • Gastroesophageal reflux disease without esophagitis   • Hyperlipidemia   • Hypertension   • Hypothyroidism   • Male urinary stress incontinence   • Osteoarthritis of knee   • Temporary cerebral vascular dysfunction   • Constipation   • Frequent use of laxatives   • Medicare annual wellness visit, subsequent   • Hypertensive encephalopathy   • Primary insomnia   • Episode of recurrent major depressive disorder (CMS/HCC)   • Bradycardia   • Complete heart block (CMS/HCC)   • Grade III hemorrhoids   • Frequent falls   • Cognitive impairment   • Environmental allergies   • Bronchitis   • Bilateral pleural effusion   • Systolic CHF, acute (CMS/HCC)   • Severe malnutrition (CMS/HCC)     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)    • Sleep apnea    • Spermatocele    • Syncope, near    • URTI (acute upper respiratory infection)      Past Surgical History:   Procedure Laterality Date   • CARDIAC ELECTROPHYSIOLOGY PROCEDURE N/A 2020    Procedure: DEVICE IMPLANT;  Surgeon: Julius Chatman DO;  Location: [facility] INVASIVE LOCATION;  Service: Cardiology;  Laterality: N/A;  MICRA   • CHOLECYSTECTOMY  20 years ago   • COLONOSCOPY  15-20 years ago   • COLONOSCOPY W/ POLYPECTOMY      Dr Toledo   • CYSTOSCOPY TRANSURETHRAL RESECTION OF PROSTATE     • PACEMAKER IMPLANTATION     • PROSTATECTOMY      non cancer, Dr Toledo   • TONSILLECTOMY       General  Information     Row Name 06/14/21 1143          OT Time and Intention    Document Type  evaluation  -SD     Mode of Treatment  occupational therapy  -SD     Row Name 06/14/21 1143          General Information    Patient Profile Reviewed  yes  -SD     Prior Level of Function  mod assist:;all household mobility;ADL's  -SD     Existing Precautions/Restrictions  fall;oxygen therapy device and L/min;pacemaker  -SD     Barriers to Rehab  medically complex;previous functional deficit;cognitive status;hearing deficit  -SD     Row Name 06/14/21 1144          Living Environment    Lives With  spouse;other (see comments);child(julianne), adult 24/7 caregiver  -SD     Row Name 06/14/21 1144          Home Main Entrance    Number of Stairs, Main Entrance  six  -SD     Stair Railings, Main Entrance  railings on both sides of stairs  -SD     Row Name 06/14/21 1144          Stairs Within Home, Primary    Number of Stairs, Within Home, Primary  none  -SD     Row Name 06/14/21 1144          Cognition    Orientation Status (Cognition)  oriented to;person;place  -SD     Row Name 06/14/21 1144          Safety Issues, Functional Mobility    Safety Issues Affecting Function (Mobility)  safety precautions follow-through/compliance;safety precaution awareness;insight into deficits/self-awareness;awareness of need for assistance;positioning of assistive device  -SD     Impairments Affecting Function (Mobility)  balance;endurance/activity tolerance;strength;postural/trunk control  -SD       User Key  (r) = Recorded By, (t) = Taken By, (c) = Cosigned By    Initials Name Provider Type    Rhea Merino OT Occupational Therapist          Mobility/ADL's     Row Name 06/14/21 1146          Bed Mobility    Comment (Bed Mobility)  in chair  -SD     Row Name 06/14/21 1146          Transfers    Transfers  sit-stand transfer  -SD     Sit-Stand [place] (Transfers)  minimum assist (75% patient effort)  -SD     Row Name 06/14/21 1146           Sit-Stand Transfer    Assistive Device (Sit-Stand Transfers)  walker, front-wheeled  -SD     Row Name 06/14/21 1146          Functional Mobility    Functional Mobility- Comment  NT  -SD     Row Name 06/14/21 1146          Activities of Daily Living    BADL Assessment/Intervention  bathing;upper body dressing;lower body dressing;grooming;feeding;toileting  -SD     Row Name 06/14/21 1146          Bathing Assessment/Intervention    [place] Level (Bathing)  maximum assist (25% patient effort)  -SD     Row Name 06/14/21 1146          Upper Body Dressing Assessment/Training    [place] Level (Upper Body Dressing)  minimum assist (75% patient effort)  -SD     Row Name 06/14/21 1146          Lower Body Dressing Assessment/Training    [place] Level (Lower Body Dressing)  moderate assist (50% patient effort)  -SD     Row Name 06/14/21 1146          Grooming Assessment/Training    [place] Level (Grooming)  minimum assist (75% patient effort)  -SD     Row Name 06/14/21 1146          Self-Feeding Assessment/Training    [place] Level (Feeding)  supervision  -SD     Row Name 06/14/21 1146          Toileting Assessment/Training    [place] Level (Toileting)  maximum assist (25% patient effort)  -SD       User Key  (r) = Recorded By, (t) = Taken By, (c) = Cosigned By    Initials Name Provider Type    Rhea Merino OT Occupational Therapist        Obj/Interventions     Row Name 06/14/21 1149          Range of Motion Comprehensive    General Range of Motion  bilateral upper extremity ROM WFL  -SD     Row Name 06/14/21 1149          Strength Comprehensive (MMT)    General Manual Muscle Testing (MMT) Assessment  upper extremity strength deficits identified  -SD     Comment, General Manual Muscle Testing (MMT) Assessment  UB 3+/5  -SD       User Key  (r) = Recorded By, (t) = Taken By, (c) = Cosigned By    Initials Name Provider Type    Rhea Merino OT Occupational Therapist        Goals/Plan      Row Name 06/14/21 1218          Transfer Goal 1 (OT)    Activity/Assistive Device (Transfer Goal 1, OT)  sit-to-stand/stand-to-sit;walker, rolling  -SD     [place] Level/Cues Needed (Transfer Goal 1, OT)  contact guard assist  -SD     Time Frame (Transfer Goal 1, OT)  long term goal (LTG)  -SD     Progress/Outcome (Transfer Goal 1, OT)  goal ongoing  -SD     Row Name 06/14/21 1218          Dressing Goal 1 (OT)    Activity/Device (Dressing Goal 1, OT)  lower body dressing  -SD     [place]/Cues Needed (Dressing Goal 1, OT)  minimum assist (75% or more patient effort)  -SD     Time Frame (Dressing Goal 1, OT)  2 weeks  -SD     Progress/Outcome (Dressing Goal 1, OT)  goal ongoing  -SD     Row Name 06/14/21 1218          Toileting Goal 1 (OT)    Activity/Device (Toileting Goal 1, OT)  toileting skills, all;commode  -SD     [place] Level/Cues Needed (Toileting Goal 1, OT)  moderate assist (50-74% patient effort)  -SD     Time Frame (Toileting Goal 1, OT)  2 weeks  -SD     Progress/Outcome (Toileting Goal 1, OT)  goal ongoing  -SD     Row Name 06/14/21 1218          Strength Goal 1 (OT)    Strength Goal 1 (OT)  Patient to perform UB ther ex as tolerated  -SD     Time Frame (Strength Goal 1, OT)  long term goal (LTG)  -SD     Progress/Outcome (Strength Goal 1, OT)  goal ongoing  -SD     Row Name 06/14/21 1218          Therapy Assessment/Plan (OT)    Planned Therapy Interventions (OT)  activity tolerance training;adaptive equipment training;BADL retraining;patient/caregiver education/training;ROM/therapeutic exercise;strengthening exercise;transfer/mobility retraining  -SD       User Key  (r) = Recorded By, (t) = Taken By, (c) = Cosigned By    Initials Name Provider Type    Rhea Merino OT Occupational Therapist        Clinical Impression     Row Name 06/14/21 1150          Pain Scale: Numbers Pre/Post-Treatment    Pretreatment Pain Rating  0/10 - no pain  -SD     Posttreatment Pain Rating  0/10 - no  pain  -SD     Row Name 06/14/21 1150          Plan of Care Review    Plan of Care Reviewed With  patient;daughter  -SD     Progress  no change  -SD     Outcome Summary  OT eval completed. Patient presents deficits in strength, endurance, balance, mobility and ADL performance. Patient is expected to benefit from continued OT services prior to DC.  -SD     Row Name 06/14/21 1150          Therapy Assessment/Plan (OT)    Patient/Family Therapy Goal Statement (OT)  Increase strength and mobility  -SD     Rehab Potential (OT)  fair, will monitor progress closely  -SD     Criteria for Skilled Therapeutic Interventions Met (OT)  skilled treatment is necessary  -SD     Therapy Frequency (OT)  3 times/wk 5 times if indicated  -SD     Row Name 06/14/21 1150          Therapy Plan Review/Discharge Plan (OT)    Anticipated Discharge Disposition (OT)  inpatient rehabilitation facility;skilled nursing facility  -SD     Row Name 06/14/21 1150          Vital Signs    O2 Delivery Pre Treatment  supplemental O2  -SD     O2 Delivery Intra Treatment  supplemental O2  -SD     O2 Delivery Post Treatment  supplemental O2  -SD     Row Name 06/14/21 1150          Positioning and Restraints    Pre-Treatment Position  sitting in chair/recliner  -SD     Post Treatment Position  chair  -SD     In Chair  sitting;call light within reach;encouraged to call for assist;exit alarm on;with family/caregiver  -SD       User Key  (r) = Recorded By, (t) = Taken By, (c) = Cosigned By    Initials Name Provider Type    Rhea Merino OT Occupational Therapist        Outcome Measures     Row Name 06/14/21 1220          How much help from another is currently needed...    Putting on and taking off regular lower body clothing?  2  -SD     Bathing (including washing, rinsing, and drying)  2  -SD     Toileting (which includes using toilet bed pan or urinal)  2  -SD     Putting on and taking off regular upper body clothing  3  -SD     Taking care of personal  grooming (such as brushing teeth)  3  -SD     Eating meals  3  -SD     AM-PAC 6 Clicks Score (OT)  15  -SD     Row Name 06/14/21 1220          Functional Assessment    Outcome Measure Options  AM-PAC 6 Clicks Daily Activity (OT)  -SD       User Key  (r) = Recorded By, (t) = Taken By, (c) = Cosigned By    Initials Name Provider Type    SD Rhea Harmon OT Occupational Therapist        Occupational Therapy Education                 Title: PT OT SLP Therapies (In Progress)     Topic: Occupational Therapy (In Progress)     Point: ADL training (Done)     Description:   Instruct learner(s) on proper safety adaptation and remediation techniques during self care or transfers.   Instruct in proper use of assistive devices.              Learning Progress Summary           Patient Acceptance, E,TB, VU by SD at 6/14/2021 1221    Comment: Benefit of OT; OT POC                   Point: Home exercise program (Not Started)     Description:   Instruct learner(s) on appropriate technique for monitoring, assisting and/or progressing therapeutic exercises/activities.              Learner Progress:  Not documented in this visit.          Point: Precautions (Not Started)     Description:   Instruct learner(s) on prescribed precautions during self-care and functional transfers.              Learner Progress:  Not documented in this visit.          Point: Body mechanics (Not Started)     Description:   Instruct learner(s) on proper positioning and spine alignment during self-care, functional mobility activities and/or exercises.              Learner Progress:  Not documented in this visit.                      User Key     Initials Effective Dates Name Provider Type Discipline    SD 03/07/18 -  Rhea Harmon OT Occupational Therapist OT              OT Recommendation and Plan  Planned Therapy Interventions (OT): activity tolerance training, adaptive equipment training, BADL retraining, patient/caregiver education/training,  ROM/therapeutic exercise, strengthening exercise, transfer/mobility retraining  Therapy Frequency (OT): 3 times/wk (5 times if indicated)  Plan of Care Review  Plan of Care Reviewed With: patient, daughter  Progress: no change  Outcome Summary: OT eval completed. Patient presents deficits in strength, endurance, balance, mobility and ADL performance. Patient is expected to benefit from continued OT services prior to DC.     Time Calculation:   Time Calculation- OT     Row Name 06/14/21 1221             Time Calculation- OT    OT Start Time  1126  -SD      OT Received On  06/14/21  -SD      OT Goal Re-Cert Due Date  06/28/21  -SD        User Key  (r) = Recorded By, (t) = Taken By, (c) = Cosigned By    Initials Name Provider Type    Rhea Merino OT Occupational Therapist        Therapy Charges for Today     Code Description Service Date Service Provider Modifiers Qty    [id number]  OT EVAL MOD COMPLEXITY 3 6/14/2021 Rhea Harmon OT GO 1               Rhea Harmon OT  6/14/2021

## 2021-06-14 NOTE — THERAPY TREATMENT NOTE
Patient Name: Checo Michele  : 1925    MRN: [id number]                              Today's Date: 2021       Admit Date: 2021    Visit Dx:     ICD-10-CM ICD-9-CM   1. Bilateral pleural effusion  J90 511.9     Patient Active Problem List   Diagnosis   • Gastroesophageal reflux disease without esophagitis   • Hyperlipidemia   • Hypertension   • Hypothyroidism   • Male urinary stress incontinence   • Osteoarthritis of knee   • Temporary cerebral vascular dysfunction   • Constipation   • Frequent use of laxatives   • Medicare annual wellness visit, subsequent   • Hypertensive encephalopathy   • Primary insomnia   • Episode of recurrent major depressive disorder (CMS/HCC)   • Bradycardia   • Complete heart block (CMS/HCC)   • Grade III hemorrhoids   • Frequent falls   • Cognitive impairment   • Environmental allergies   • Bronchitis   • Bilateral pleural effusion   • Systolic CHF, acute (CMS/HCC)   • Severe malnutrition (CMS/HCC)     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)    • Sleep apnea    • Spermatocele    • Syncope, near    • URTI (acute upper respiratory infection)      Past Surgical History:   Procedure Laterality Date   • CARDIAC ELECTROPHYSIOLOGY PROCEDURE N/A 2020    Procedure: DEVICE IMPLANT;  Surgeon: Julius Chatman DO;  Location: [facility] INVASIVE LOCATION;  Service: Cardiology;  Laterality: N/A;  MICRA   • CHOLECYSTECTOMY  20 years ago   • COLONOSCOPY  15-20 years ago   • COLONOSCOPY W/ POLYPECTOMY      Dr Toledo   • CYSTOSCOPY TRANSURETHRAL RESECTION OF PROSTATE     • PACEMAKER IMPLANTATION     • PROSTATECTOMY      non cancer, Dr Toledo   • TONSILLECTOMY       General  Information     Row Name 06/14/21 1327          Physical Therapy Time and Intention    Document Type  therapy note (daily note)  -RM     Mode of Treatment  physical therapy  -RM     Row Name 06/14/21 1327          General Information    Patient Profile Reviewed  yes  -RM     Existing Precautions/Restrictions  fall;oxygen therapy device and L/min;pacemaker 2 lpm  -RM     Row Name 06/14/21 1327          Cognition    Orientation Status (Cognition)  oriented to;person;place  -RM     Row Name 06/14/21 1327          Safety Issues, Functional Mobility    Safety Issues Affecting Function (Mobility)  safety precautions follow-through/compliance;insight into deficits/self-awareness;positioning of assistive device  -RM     Impairments Affecting Function (Mobility)  balance;endurance/activity tolerance;strength  -RM       User Key  (r) = Recorded By, (t) = Taken By, (c) = Cosigned By    Initials Name Provider Type    Gaetano Gonzales, THALIA Physical Therapy Assistant        Mobility     Row Name 06/14/21 1328          Bed Mobility    Supine-Sit [place] (Bed Mobility)  minimum assist (75% patient effort);verbal cues  -RM     Assistive Device (Bed Mobility)  bed rails;draw sheet  -     Row Name 06/14/21 1328          Sit-Stand Transfer    Sit-Stand [place] (Transfers)  minimum assist (75% patient effort)  -RM     Assistive Device (Sit-Stand Transfers)  walker, front-wheeled  -RM     Row Name 06/14/21 1328          Gait/Stairs (Locomotion)    [place] Level (Gait)  minimum assist (75% patient effort);verbal cues  -RM     Assistive Device (Gait)  walker, front-wheeled  -RM     Distance in Feet (Gait)  3  -RM     Deviations/Abnormal Patterns (Gait)  festinating/shuffling;gait speed decreased;base of support, wide  -RM     Bilateral Gait Deviations  forward flexed posture  -RM       User Key  (r) = Recorded By, (t) = Taken By, (c) = Cosigned By    Initials Name Provider Type    Gaetano Gonzales, THALIA Physical  Therapy Assistant        Obj/Interventions     [facility] Name 06/14/21 1329          Motor Skills    Therapeutic Exercise  hip;knee;ankle  -RM     [facility] Name 06/14/21 1329          Hip (Therapeutic Exercise)    Hip (Therapeutic Exercise)  strengthening exercise  -RM     Hip Strengthening (Therapeutic Exercise)  bilateral;marching while seated;10 repetitions;5 repetitions  -RM     [facility] Name 06/14/21 1329          Knee (Therapeutic Exercise)    Knee (Therapeutic Exercise)  strengthening exercise  -RM     Knee Strengthening (Therapeutic Exercise)  LAQ (long arc quad);bilateral;5 repetitions;10 repetitions  -RM     [facility] Name 06/14/21 1329          Ankle (Therapeutic Exercise)    Ankle (Therapeutic Exercise)  AROM (active range of motion)  -RM     Ankle AROM (Therapeutic Exercise)  bilateral;dorsiflexion;plantarflexion;10 repetitions  -RM       User Key  (r) = Recorded By, (t) = Taken By, (c) = Cosigned By    Initials Name Provider Type    RM Gaetano Be, PTA Physical Therapy Assistant        Goals/Plan    No documentation.       Clinical Impression     Row Name 06/14/21 1329          Pain Scale: Numbers Pre/Post-Treatment    Pretreatment Pain Rating  0/10 - no pain  -RM     Posttreatment Pain Rating  0/10 - no pain  -RM     Row Name 06/14/21 1329          Plan of Care Review    Plan of Care Reviewed With  patient  -RM     Progress  improving  -     Outcome Summary  PT tolerates treatment well. Pt much more alert this treatment and improved ability to follow commands. PT was able to sit EOB for approx 3 minutes unsupported.  Pt also was able to transfer from bed to chair with min a and use of rw and vc's. PT also performed B LE seated esercises. See flowsheet for details.  -[facility] Name 06/14/21 1329          Vital Signs    Pre SpO2 (%)  95  -RM     O2 Delivery Pre Treatment  supplemental O2  -RM     Intra SpO2 (%)  94  -RM     O2 Delivery Intra Treatment  supplemental O2  -RM     Post SpO2 (%)  95  -RM     O2 Delivery  Post Treatment  supplemental O2  -RM     Pre Patient Position  Supine  -RM     Intra Patient Position  Standing  -RM     Post Patient Position  Sitting  -RM     Row Name 06/14/21 1329          Positioning and Restraints    Pre-Treatment Position  in bed  -RM     Post Treatment Position  chair  -RM     In Chair  reclined;call light within reach;encouraged to call for assist;exit alarm on;with family/caregiver;notified nsg  -RM       User Key  (r) = Recorded By, (t) = Taken By, (c) = Cosigned By    Initials Name Provider Type     Gaetano Be, THALIA Physical Therapy Assistant        Outcome Measures     Row Name 06/14/21 1332          How much help from another person do you currently need...    Turning from your back to your side while in flat bed without using bedrails?  3  -RM     Moving from lying on back to sitting on the side of a flat bed without bedrails?  3  -RM     Moving to and from a bed to a chair (including a wheelchair)?  3  -RM     Standing up from a chair using your arms (e.g., wheelchair, bedside chair)?  3  -RM     Climbing 3-5 steps with a railing?  1  -RM     To walk in hospital room?  2  -RM     AM-PAC 6 Clicks Score (PT)  15  -RM     Row Name 06/14/21 1332 06/14/21 1220       Functional Assessment    Outcome Measure Options  AM-PAC 6 Clicks Basic Mobility (PT)  -RM  AM-PAC 6 Clicks Daily Activity (OT)  -SD      User Key  (r) = Recorded By, (t) = Taken By, (c) = Cosigned By    Initials Name Provider Type    Gaetano Gonzales, THALIA Physical Therapy Assistant    Rhea Merino OT Occupational Therapist        Physical Therapy Education                 Title: PT OT SLP Therapies (In Progress)     Topic: Physical Therapy (In Progress)     Point: Mobility training (Done)     Learning Progress Summary           Patient Acceptance, E,TB,D, VU,NR by  at 6/14/2021 1332    Comment: safety with transfers and proper exercise techniques.    Acceptance, E,D, DU,NR by  at 6/13/2021 1203     Comment: Pt education began for improving bed mobility technique and sitting balance technique on EOB.                   Point: Home exercise program (Done)     Learning Progress Summary           Patient Acceptance, E,TB,D, VU,NR by  at 6/14/2021 1332    Comment: safety with transfers and proper exercise techniques.                   Point: Body mechanics (Not Started)     Learner Progress:  Not documented in this visit.          Point: Precautions (Not Started)     Learner Progress:  Not documented in this visit.                      User Key     Initials Effective Dates Name Provider Type Discipline     03/07/18 -  Gaetano Be, THALIA Physical Therapy Assistant PT    TW 03/26/19 -  Alicia Cooper, PT Physical Therapist PT              PT Recommendation and Plan     Plan of Care Reviewed With: patient  Progress: improving  Outcome Summary: PT tolerates treatment well. Pt much more alert this treatment and improved ability to follow commands. PT was able to sit EOB for approx 3 minutes unsupported.  Pt also was able to transfer from bed to chair with min a and use of rw and vc's. PT also performed B LE seated esercises. See flowsheet for details.     Time Calculation:   PT Charges     Row Name 06/14/21 1334             Time Calculation    Start Time  1022  -RM      Stop Time  1106  -RM      Time Calculation (min)  44 min  -RM      PT Received On  06/14/21  -RM      PT Goal Re-Cert Due Date  06/23/21  -RM         Time Calculation- PT    Total Timed Code Minutes- PT  44 minute(s)  -RM         Timed Charges    69429 - PT Therapeutic Exercise Minutes  15  -RM      42528 - PT Therapeutic Activity Minutes  29  -RM         Total Minutes    Timed Charges Total Minutes  44  -RM       Total Minutes  44  -RM        User Key  (r) = Recorded By, (t) = Taken By, (c) = Cosigned By    Initials Name Provider Type     Gaetano Be, THALIA Physical Therapy Assistant        Therapy Charges for Today     Code Description  Service Date Service Provider Modifiers Qty    84049915873 HC PT THER PROC EA 15 MIN 6/14/2021 Gaetano Be, PTA GP 1    03575091001 HC PT THERAPEUTIC ACT EA 15 MIN 6/14/2021 Gaetano Be, PTA GP 2          PT G-Codes  Outcome Measure Options: AM-PAC 6 Clicks Basic Mobility (PT)  AM-PAC 6 Clicks Score (PT): 15  AM-PAC 6 Clicks Score (OT): 15    Gaetano Be PTA  6/14/2021

## 2021-06-14 NOTE — PROGRESS NOTES
[facility]    Name:  Checo Michele   Age:  89 y.o.  Sex:  male  :  1925  MRN:  [id number]   Visit Number:  [id number]  Admission Date:  2021  Date Of Service:  21  Primary Care Physician:  Vermeesch, Marilyn K, MD     LOS: 3 days :  Patient Care Team:  Vermeesch, Marilyn K, MD as PCP - General (Internal Medicine & Pediatrics)  Damaris Knapp MD as Consulting Physician (General Surgery)  Annette Langley MSW as  (Population Health):    History taken from:     patient chart family    Chief Complaint:      Dyspnea    Subjective:    Interval History:     Patient seen and examined today.  Reviewed history and physical, lab work, x-rays, chart.    Patient was admitted on 2021 with shortness of breath.  He was noted to have bilateral pleural effusions.  He was placed on IV Lasix.  He has known diastolic congestive heart failure.  He also has what appears to be severe malnutrition.  He is eating better today per family.    He is very weak however and they are having difficulty managing him home.  They were interested in nursing home placement and possible hospice.  Palliative care has been consulted.    Spoke to the daughter, answered all questions.    Review of Systems:     All systems were reviewed and negative except for:  Musculoskeletal: positive for  muscle weakness    Objective:    Vital Signs:    Temp:  [96.6 °F (35.9 °C)-97.8 °F (36.6 °C)] 96.9 °F (36.1 °C)  Heart Rate:  [77-93] 93  Resp:  [16-18] 18  BP: (114-132)/(58-79) 122/78    Physical Exam:    General: Alert and oriented x4, mild distress.  Heart: Regular rate rhythm without murmur rub or thrill.  Lungs: Decreased breath sounds bilaterally without use of accessory muscles respiration.  Abdomen: Soft/nontender/nondistended.  No HSM noted.  MSK: Muscle wasting noted.  4/5 strength in upper/lower extremities bilaterally.     Results Review:      I reviewed the  patient's new clinical results.  I reviewed the patient's new imaging results and agree with the interpretation.  I reviewed the patient's other test results and agree with the interpretation  I personally viewed and interpreted the patient's EKG/Telemetry data    Labs:    Results from last 7 days   Lab Units 06/14/21  0609 06/13/21  0611 06/12/21  0559 06/11/21  2132   SODIUM mmol/L 130* 132* 126* 125*   POTASSIUM mmol/L 3.5 3.9 3.6 4.4   CHLORIDE mmol/L 96* 95* 94* 92*   CO2 mmol/L 28.8 26.9 24.6 22.8   BUN mg/dL 12 14 14 16   CREATININE mg/dL 0.72* 0.84 0.64* 0.85   CALCIUM mg/dL 8.3 8.3 8.3 8.4   BILIRUBIN mg/dL 1.3*  --   --  1.6*   ALK PHOS U/L 86  --   --  97   ALT (SGPT) U/L 10  --   --  13   AST (SGOT) U/L 22  --   --  23   GLUCOSE mg/dL 70 79 76 91     Results from last 7 days   Lab Units 06/12/21  0559 06/11/21  2132   WBC 10*3/mm3 4.75 5.48   HEMOGLOBIN g/dL 13.0 13.4   HEMATOCRIT % 38.4 40.7   PLATELETS 10*3/mm3 250 234         Results from last 7 days   Lab Units 06/12/21  1346 06/12/21  0559 06/11/21  2132   TROPONIN T ng/mL 0.019 0.036* 0.024     Results from last 7 days   Lab Units 06/11/21  2132 06/11/21  2128   BLOODCX  No growth at 2 days No growth at 2 days            Radiology:    Imaging Results (Last 24 Hours)     Procedure Component Value Units Date/Time    XR Chest 1 View [[id number]] Collected: 06/14/21 0808     Updated: 06/14/21 0810    Narrative:      PROCEDURE: XR CHEST 1 VW-     HISTORY: f/u effusions; G02-Ttupkdx effusion, not elsewhere classified     COMPARISON: 6/11/2021.     FINDINGS: The heart is normal in size. The mediastinum is unremarkable.  There has been interval improvement in the patient's interstitial edema.  There has been interval decrease in size of the patient's left effusion  with a stable right effusion. There is no pneumothorax.  There are no  acute osseous abnormalities.       Impression:      Decrease in size of the patient's left effusion with a  stable right  effusion.     Continued followup is recommended.     This report was finalized on 6/14/2021 8:08 AM by Glenis Heller M.D..          Medication Review:     aspirin, 81 mg, Oral, Daily  atorvastatin, 20 mg, Oral, Daily  carvedilol, 12.5 mg, Oral, BID With Meals  [START ON 6/15/2021] enoxaparin, 30 mg, Subcutaneous, Q24H  escitalopram, 10 mg, Oral, Daily  furosemide, 40 mg, Intravenous, Daily  ipratropium-albuterol, 3 mL, Nebulization, Q6H - RT  oxybutynin XL, 5 mg, Oral, Daily  PRO-STAT, 30 mL, Oral, BID  QUEtiapine, 25 mg, Oral, Nightly        Pharmacy to Dose enoxaparin (LOVENOX),         Assessment:    Principal Problem:    Bilateral pleural effusion  Active Problems:    Gastroesophageal reflux disease without esophagitis    Hypertension    Hypothyroidism    Male urinary stress incontinence    Osteoarthritis of knee    Cognitive impairment    Systolic CHF, acute (CMS/HCC)    Severe malnutrition (CMS/HCC)      1.  Acute on chronic diastolic congestive heart failure, present admission  2.  Bilateral pleural effusions  3.  Severe chronic disease related malnutrition  4.  Failure to thrive.  5.  Hypothyroidism  6.  Essential hypertension      Plan:    Spoke to daughter, they are interested in nursing home with possible hospice.  Palliative care has been consulted.  Change Lasix to oral.  Check lab work in the a.m.  Echo pending.  Patient is DNR/DNI.  Lovenox for DVT prophylaxis.  PT and OT working with the patient.  Further recommendations will depend on the clinical course.    Doug Dominguez DO  06/14/21  16:43 EDT

## 2021-06-14 NOTE — PLAN OF CARE
Goal Outcome Evaluation:            VSS att. Pt rested well overnight with no acute events. Daughter remained at bedside. No c/o pain. Palliative consult scheduled for today.

## 2021-06-14 NOTE — CONSULTS
[facility]    INPATIENT ADVANCED CARE PLANNING CONSULT      Name:  Checo Michele   Age:  89 y.o.  Sex:  male  :  1925  MRN:  [id number]   Visit Number:  [id number]  Date of Service:  2021  Date of Admission:  2021  Primary Care Physician:  Vermeesch, Marilyn K, MD      Consulting Physician:    Dr. Elly Torres    Reason For Consult:    Addressing advanced care planning in the setting of multiple medical problems and comorbidities with progressive decline    Brief Summary:    I reviewed in detail patient's clinical course on admission and sequence of events since then as reflected by history and physical, progress notes, consults, staff input and work-up.  In summary, Mr. Michele is a 89 years old gentleman with history of chronic diastolic heart failure, severe malnutrition, failure to thrive, hypertension and cognitive impairment who was hospitalized with a diagnosis of acute on chronic diastolic heart failure with secondary bilateral pleural effusions.  IV Lasix was administered with symptomatic improvement.  Palliative care consultation was requested to address advanced care planning and goals of care given patient's frail condition and progressive decline.  Advance Care Planning   1.  Determination of decisional capacity:    ·  During my visit, patient did not have decisional capacity as he was unable to participate in the conversation.      2.  Advanced Directives:    · Nathalia Ackerman is power of  [document is not available per record]  · Patient does have a living will, nathalia Ackerman is healthcare surrogate [document is available in epic]  · DO NOT RESUSCITATE, DO NOT INTUBATE    3.  Patient & Family Meeting    · Meeting attendees: Lore Torres; palliative care RN and myself  · Meeting location: Patient's room on fourth floor    Summary of the discussion:    · After initial introductions and presenting the role of palliative care team,  daughter was encouraged to describe patient's clinical course during the past few months.  She stated that he clearly declined with increasing weakness, decreased mobility and decreased appetite/p.o. intake.  She is aware of his current condition and potentially poor prognosis as well as management plan.  · In response to my question regarding patient's previously expressed wishes, she stated that her father would not elect to proceed with any aggressive measures, particularly in the event of poor prognosis or compromised quality of life.  She also confirmed DO NOT RESUSCITATE, DO NOT INTUBATE CODE STATUS based on patient's previously expressed wishes.  · As of present, she wishes to proceed with skilled nursing care services with PT, OT and possibly speech therapy.    · On the other hand, in the event of poor engagement with the same, she wishes to shift focus to comfort measures by hospice services.  · I had a detailed discussion with daughter regarding hospice care benefit, services and levels of care.  · I provided Lore with my contact information and encouraged her to reach out at any time should she have questions or concerns.  · Above was communicated to case management and primary attending who concurred and will follow on further plans.    Conclusions:    · Code status: DO NOT RESUSCITATE, DO NOT INTUBATE  · Medical interventions: Conservative measures, PT and OT at present; shift focus to comfort measures in the event of progressive decline  · Advanced directives: EMS DNR to be completed prior to discharge       I appreciate the opportunity to participate in Mr. Michele's care.  Please feel free to contact me for any upcoming questions or concerns as need arises.     Total time spent in counseling and advanced care planning discussion per above documentation 60 min.     Dragon system was utilized for this dictation; therefore unintended spelling or expressions may be noted in the body of this document.

## 2021-06-14 NOTE — PLAN OF CARE
Goal Outcome Evaluation:  Plan of Care Reviewed With: patient, daughter        Progress: no change  Outcome Summary: OT eval completed. Patient presents deficits in strength, endurance, balance, mobility and ADL performance. Patient is expected to benefit from continued OT services prior to DC.

## 2021-06-14 NOTE — CASE MANAGEMENT/SOCIAL WORK
Continued Stay Note   [facility]     Patient Name: Checo Michele  MRN: [id number]  Today's Date: 6/14/2021    Admit Date: 6/11/2021    Discharge Plan    Trying to find placement for this patient.  Their first choice is [facility].  The patient was referred there yesterday, and I have made multiple phone calls to admissions there, and they haven't called me back. Three voice messages left for Luly Rivera at [facility].  The patient will be going private pay for LTC w/hospice.          Marie Almonte RN

## 2021-06-15 NOTE — DISCHARGE SUMMARY
[facility]   DISCHARGE SUMMARY      Name:  Checo Michele   Age:  89 y.o.  Sex:  male  :  1925  MRN:  [id number]   Visit Number:  [id number]  Primary Care Physician:  Vermeesch, Marilyn K, MD  Date of Discharge:  6/15/2021  Admission Date:  2021      Discharge Diagnosis:     1.  Acute on chronic diastolic congestive heart failure, present on admission  2.  Bilateral pleural effusions  3.  Severe chronic disease related malnutrition  4.  Failure to thrive.  5.  Hypothyroidism  6.  Essential hypertension  7.  Pericardial effusion without tamponade.  Active Hospital Problems    Diagnosis  POA   • **Bilateral pleural effusion [J90]  Yes   • Severe malnutrition (CMS/HCC) [E43]  Yes   • Systolic CHF, acute (CMS/HCC) [I50.21]  Unknown   • Cognitive impairment [R41.89]  Yes   • Hypothyroidism [E03.9]  Yes   • Hypertension [I10]  Yes   • Gastroesophageal reflux disease without esophagitis [K21.9]  Yes   • Osteoarthritis of knee [M17.10]  Yes   • Male urinary stress incontinence [N39.3]  Yes      Resolved Hospital Problems   No resolved problems to display.         Presenting Problem/History of Present Illness:    Bilateral pleural effusion [J90]         Hospital Course:    Patient was admitted on 2021 with shortness of breath.  He was noted to have bilateral pleural effusions.  He was placed on IV Lasix.  He has known diastolic congestive heart failure.  He also has what appears to be severe malnutrition.  He is eating better today per family.     He is very weak however and they are having difficulty managing him at home.  They were interested in nursing home placement and possible hospice.  Palliative care was consulted along with case management.  Arrangements have been made for the patient to go to nursing home.  Dr. Uribe will be his physician, family has deferred hospice at this point.  Patient will continue with PT and OT while over there.  Family will see how  he does with this.    Patient himself denies any chest pressure, shortness of breath, nausea, vomiting, or pain.  He was on IV Lasix, he has been switched to oral Lasix at 40 mg daily.  Chest x-ray was reviewed and has improved.  Patient is on 2 L of oxygen.  Continue to encourage oral intake.  Spoke with cardiology Dr. Silver regarding echocardiogram, he states that the patient has a pericardial effusion, there is no tamponade however.  Family was made aware of this, no further work-up is warranted at this time.    Spoke to the daughter who was present, answered all questions.    Procedures Performed:           Consults:     Consults     Date and Time Order Name Status Description    6/14/2021 12:33 AM Inpatient Palliative Care MD Consult            Pertinent Test Results:     Results from last 7 days   Lab Units 06/15/21  0620 06/14/21  0609 06/13/21  0611 06/11/21  2132   SODIUM mmol/L 132* 130* 132* 125*   POTASSIUM mmol/L 3.9 3.5 3.9 4.4   CHLORIDE mmol/L 96* 96* 95* 92*   CO2 mmol/L 29.2* 28.8 26.9 22.8   BUN mg/dL 22 12 14 16   CREATININE mg/dL 0.82 0.72* 0.84 0.85   CALCIUM mg/dL 8.3 8.3 8.3 8.4   BILIRUBIN mg/dL  --  1.3*  --  1.6*   ALK PHOS U/L  --  86  --  97   ALT (SGPT) U/L  --  10  --  13   AST (SGOT) U/L  --  22  --  23   GLUCOSE mg/dL 94 70 79 91     Results from last 7 days   Lab Units 06/15/21  0620 06/12/21  0559 06/11/21  2132   WBC 10*3/mm3 6.64 4.75 5.48   HEMOGLOBIN g/dL 12.6* 13.0 13.4   HEMATOCRIT % 37.4* 38.4 40.7   PLATELETS 10*3/mm3 241 250 234         Results from last 7 days   Lab Units 06/12/21  1346 06/12/21  0559 06/11/21  2132   TROPONIN T ng/mL 0.019 0.036* 0.024     Results from last 7 days   Lab Units 06/11/21  2132   PROBNP pg/mL 2,493.0*                       Invalid input(s): USDES,  BLOODU, NITRITITE, BACT, EP  Pain Management Panel     Pain Management Panel Latest Ref Rng & Units 11/29/2019    AMPHETAMINES SCREEN, URINE Negative Negative    BARBITURATES SCREEN Negative  Negative    BENZODIAZEPINE SCREEN, URINE Negative Negative    BUPRENORPHINEUR Negative Negative    COCAINE SCREEN, URINE Negative Negative    METHADONE SCREEN, URINE Negative Negative    METHAMPHETAMINEUR Negative Negative        Results from last 7 days   Lab Units 06/11/21 2132 06/11/21 2128   BLOODCX  No growth at 3 days No growth at 3 days       Imaging Results (All)     Procedure Component Value Units Date/Time    XR Chest 1 View [[id number]] Collected: 06/14/21 0808     Updated: 06/14/21 0810    Narrative:      PROCEDURE: XR CHEST 1 VW-     HISTORY: f/u effusions; Q98-Ynehlqf effusion, not elsewhere classified     COMPARISON: 6/11/2021.     FINDINGS: The heart is normal in size. The mediastinum is unremarkable.  There has been interval improvement in the patient's interstitial edema.  There has been interval decrease in size of the patient's left effusion  with a stable right effusion. There is no pneumothorax.  There are no  acute osseous abnormalities.       Impression:      Decrease in size of the patient's left effusion with a  stable right effusion.     Continued followup is recommended.     This report was finalized on 6/14/2021 8:08 AM by Glenis Heller M.D..    XR Chest 1 View [[id number]] Collected: 06/11/21 2215     Updated: 06/11/21 2216    Narrative:      FINAL REPORT    TECHNIQUE:  An AP portable view of the chest was obtained.    CLINICAL HISTORY:  soa    FINDINGS:  There are moderately large bilateral pleural effusions obscuring  the lower lobes.  The upper lung fields are clear.  The heart  size cannot be determined.  There is no acute osseous  abnormality.      Impression:      Moderately large bilateral pleural effusions.    Authenticated by Bryce Benjamin M.D. on 06/11/2021 10:15:55 PM          Results for orders placed during the hospital encounter of 06/11/21    Adult Transthoracic Echo Complete W/ Cont if Necessary Per Protocol    Interpretation Summary  1.  Normal left ventricular size  "with moderately reduced LV systolic function, LVEF approximately 30-35%.  2.  Mild hypertrophy of the basal septum.  3.  Normal right ventricular size and systolic function.  4.  Moderate mitral regurgitation.  5.  Mild aortic regurgitation.  6.  Mild tricuspid regurgitation.  7.  Trace pericardial effusion without tamponade physiology.  8.  Bilateral pleural effusions.      Results for orders placed during the hospital encounter of 11/23/20    Adult Transthoracic Echo Complete W/ Cont if Necessary Per Protocol    Interpretation Summary  1.  Normal left ventricular size and systolic function, LVEF 60-65%.  2.  Mild concentric LVH.  3.  Moderately increased left atrial volume index.  4.  Grade 2 diastolic dysfunction.  5.  Normal right ventricular size and systolic function.  6.  Mild right atrial dilation.  7.  Mild calcification of the aortic valve without significant stenosis.  8.  Mild aortic regurgitation.  9.  Mild mitral regurgitation.  10.  Mild tricuspid regurgitation, RVSP 45 mmHg.  11.  Moderate bilateral pleural effusions.      Condition on Discharge:      Stable with poor long-term prognosis.    Vital Signs:    /82 (BP Location: Right arm, Patient Position: Lying)   Pulse 93   Temp 97.8 °F (36.6 °C) (Axillary)   Resp 16   Ht 177.8 cm (70\")   Wt 56 kg (123 lb 7.3 oz)   SpO2 96%   BMI 17.71 kg/m²     Physical Exam:    General: Alert and oriented x2, mild distress.  Heart: Regular rate rhythm without murmur rub or thrill.  Lungs: Decreased breath sounds bilaterally without use of accessory muscles respiration.  Abdomen: Soft/nontender/nondistended.  No HSM noted.  MSK: Muscle wasting noted.  4/5 strength in upper/lower extremities bilaterally    Discharge Disposition:    Skilled Nursing Facility (DC - External)    Discharge Medication:       Discharge Medications      New Medications      Instructions Start Date   ipratropium-albuterol 0.5-2.5 mg/3 ml nebulizer  Commonly known as: DUO-NEB   3 " mL, Nebulization, Every 4 Hours PRN      ipratropium-albuterol 0.5-2.5 mg/3 ml nebulizer  Commonly known as: DUO-NEB   3 mL, Nebulization, Every 6 Hours - RT      PRO-STAT liquid oral liquid   30 mL, Oral, 2 Times Daily      QUEtiapine 25 MG tablet  Commonly known as: SEROquel   25 mg, Oral, Nightly         Changes to Medications      Instructions Start Date   furosemide 40 MG tablet  Commonly known as: LASIX  What changed:   · medication strength  · how much to take  · how to take this  · when to take this  · additional instructions   40 mg, Oral, Daily   Start Date: June 16, 2021     tolterodine LA 4 MG 24 hr capsule  Commonly known as: DETROL LA  What changed:   · how much to take  · how to take this  · when to take this  · additional instructions   TAKE 1 CAPSULE DAILY         Continue These Medications      Instructions Start Date   acetaminophen 325 MG tablet  Commonly known as: TYLENOL   650 mg, Oral, Every 4 Hours PRN      aspirin 81 MG tablet   Oral, Daily      atorvastatin 20 MG tablet  Commonly known as: LIPITOR   TAKE 1 TABLET DAILY      carvedilol 12.5 MG tablet  Commonly known as: COREG   12.5 mg, Oral, 2 Times Daily With Meals      escitalopram 10 MG tablet  Commonly known as: LEXAPRO   10 mg, Oral, Daily      levothyroxine 50 MCG tablet  Commonly known as: SYNTHROID, LEVOTHROID   50 mcg, Oral, Daily      Triamcinolone Acetonide 55 MCG/ACT nasal inhaler  Commonly known as: NASACORT   2 sprays, Nasal, Daily         Stop These Medications    candesartan 4 MG tablet  Commonly known as: ATACAND     clonazePAM 1 MG tablet  Commonly known as: KlonoPIN     doxycycline 100 MG enteric coated tablet  Commonly known as: DORYX     Hydrocortisone (Perianal) 2.5 % rectal cream  Commonly known as: ANUSOL-HC            Discharge Diet:     Diet Instructions     Diet: Regular, Cardiac      Discharge Diet:  Regular  Cardiac             Activity at Discharge:     Activity Instructions     Activity as Tolerated             Follow-up Appointments:    Future Appointments   Date Time Provider Department Center   10/11/2021 10:15 AM Vermeesch, Marilyn K, MD [facility]   3/7/2022  2:30 PM Julius Chatman DO [facility]     Additional Instructions for the Follow-ups that You Need to Schedule     Discharge Follow-up with PCP   As directed       Currently Documented PCP:    Vermeesch, Marilyn K, MD    PCP Phone Number:    [phone]     Follow Up Details: 1-2 weeks               Test Results Pending at Discharge:    Pending Labs     Order Current Status    Blood Culture With NESTOR - Blood, Arm, Left Preliminary result    Blood Culture With NESTOR - Blood, Arm, Left Preliminary result        Discharge took less than 30 minutes.     Doug Dominguez DO  06/15/21  12:30 EDT

## 2021-06-15 NOTE — PLAN OF CARE
Goal Outcome Evaluation:              Outcome Summary: Pt resting.  Labs monitored daily.  Family supportive and stays with patient during waking hours.  Tolerating oxygen at 3 lpm/nc.

## 2021-06-15 NOTE — NURSING NOTE
Called report to [facility] and spoke with Jones RN, patient will be transferred via [facility]

## 2021-06-15 NOTE — CASE MANAGEMENT/SOCIAL WORK
Case Management Discharge Note       Patient is to be discharged to [facility] for LT Private pay  via [facility].     Call report to [phone] D Leonard  FAX DC summary to [phone]    Selected Continued Care - Admitted Since 6/11/2021     Destination Coordination complete    Service Provider Selected Services Address Phone Fax Patient Preferred    [facility]  Intermediate Care [address], [facility] [address] [phone] --           Transportation Services  Ambulance: [facility]    Final Discharge Disposition Code: 04 - intermediate care facility

## 2021-06-15 NOTE — PLAN OF CARE
Visited patient in his room; his daughter (Lore) is at bedside.  Pt is sitting up in bed eating lunch.  He appears to be comfortable.  He doesn't speak but smiles at time when spoken to.  Lore confirms that she has spoken to [facility] representative and they are accepting patient today.  Lengthy discussion had re ST rehab vs LTC with PT/OT services vs LTC with hospice.  Pt appears to have improved and daughter reports that she would like to continue with therapy at this time.  However, she is not concerned with having ST rehab she just wants to have some PT/OT services to keep him moving as long as he can.  She does express that if patient declines that she does not wish to have him return to the hospital.  Lore informed to make her wishes known to staff at [facility] and they could initiate hospice, if pt declines.  Lore verbalized understanding.    EMS/DNR discussed.  Lore verbalized understanding and signed form.  Original copy placed on pt's paper chart.  SLICK Rae updated.    Pt was discharged to [facility] at 14:08.

## 2021-06-15 NOTE — PLAN OF CARE
Goal Outcome Evaluation:         Titrated to 2L NC and tolerating, anticipate discharge today, WCTM and notify MD for any issues or concerns

## 2021-06-23 NOTE — OUTREACH NOTE
"Care Coordination    SW reached out to  Meliza MIRELES to follow up. Meliza stated, \" I provided her (Lacey the daughter I met with) with resources when I was there including sitter services. I also discussed possible in home assistance through the [facility] and the family caregiver support program through [facility],and gave them numbers for this to follow up. We discharged him from  on 6.11.21\". SW thanked Meliza for the update.     At this time SW will close case due to needs being addressed by  ALINE.     KYLIE Ko   Ambulatory Case Management    6/23/2021 12:44 EDT    "

## 2021-06-30 NOTE — TELEPHONE ENCOUNTER
Minerva Damian RN, called to report   pt is a resident @ [facility] ([place], [place]): [phone]. Requested assistance w/transmission.    Transmission received.

## 2021-07-08 NOTE — PROGRESS NOTES
Nursing Home Progress Note        Kvng Mario DO []  LINDA Mora []  [address] [place] [address]  Phone: [phone]  Fax: [phone] Betsy Uribe MD []  Edmond Curry DO []  Brisa Doss PA-C [x]   [address] [place] [address]  Phone: [phone]  Fax: [phone]     PATIENT NAME: Checo Michele                                                                          YOB: 1925           DATE OF SERVICE: 7/8/2021  FACILITY: [facility]    CHIEF COMPLAINT:  Follow up on fall.       HISTORY OF PRESENT ILLNESS:   Mr. Michele is a 89 y/o male who presents today for follow up on fall.  No acute injury reported.  He continues with his baseline cognition, frequent episodes of agitation, frailty.  Nursing has noted increased erythema and drainage to bilateral eyes.  No respiratory symptoms, congestion, fever.  Appetite is stable.  He is followed by psychiatry services with most recent visit 6/30 with no recommendations.  Upon assessment he is noted to lesions to bilateral elbows consistent with psoriasis plaques.  He denies any pain.    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)    • Sleep apnea    • Spermatocele    • Syncope, near    • URTI (acute upper respiratory infection)       Past Surgical History:   Procedure Laterality Date   • CARDIAC ELECTROPHYSIOLOGY PROCEDURE N/A 11/24/2020    Procedure: DEVICE IMPLANT;  Surgeon: Julius Chatman DO;  Location: [facility] INVASIVE LOCATION;  Service: Cardiology;  Laterality: N/A;  MICRA   • CHOLECYSTECTOMY  20 years ago   • COLONOSCOPY  15-20 years ago   • COLONOSCOPY W/  POLYPECTOMY      Dr Toledo   • CYSTOSCOPY TRANSURETHRAL RESECTION OF PROSTATE     • PACEMAKER IMPLANTATION     • PROSTATECTOMY      non cancer, Dr Toledo   • TONSILLECTOMY  1931         MEDICATIONS:  I have reviewed and reconciled the patients medication list in the patients chart at the skilled nursing facility today.      ALLERGIES:  Allergies   Allergen Reactions   • Ace Inhibitors    • Amoxicillin-Pot Clavulanate    • Cefaclor    • Hydrochlorothiazide    • Nitrofurantoin          SOCIAL HISTORY:  Social History     Socioeconomic History   • Marital status:      Spouse name: Not on file   • Number of children: Not on file   • Years of education: Not on file   • Highest education level: Not on file   Tobacco Use   • Smoking status: Never Smoker   • Smokeless tobacco: Never Used   Vaping Use   • Vaping Use: Never used   Substance and Sexual Activity   • Alcohol use: Not Currently     Alcohol/week: 3.0 standard drinks     Types: 3 Cans of beer per week   • Drug use: No   • Sexual activity: Defer       FAMILY HISTORY:  Family History   Problem Relation Age of Onset   • Other Other         STROKE SYNDROME   • No Known Problems Mother    • No Known Problems Father    • Colon cancer Neg Hx        REVIEW OF SYSTEMS:    Frequent falls, agitation/beahviors (episodic), confusion (baseline)  All other systems were reviewed and are negative.     PHYSICAL EXAMINATION:     VITAL SIGNS:  /69   Pulse 66   Temp 97.9 °F (36.6 °C)   Resp 18   SpO2 97%     Nursing notes and vital signs reviewed.   General Appearance:  Frail elderly male, NAD.  Appears non-toxic.    Head: Normocephalic and atraumatic, without obvious abnormality     Eyes: PERRLA.  Bilaterally the conjunctiva is injected, scant drainage noted.  EOM are within normal limits.    Neck: Normal range of motion. Neck supple. No JVD present.   Cardiovascular: Normal rate, regular rhythm.  Pulses palpable equal bilaterally.    Pulmonary/Chest: Effort normal and  breath sounds normal. No respiratory distress.   Abdominal: Soft. Bowel sounds are normal. No distention or tenderness.     Musculoskeletal: Normal range of motion. No edema.   Neurological: Alert and oriented at baseline.    Skin: Skin is warm and dry.  Bilateral elbows have thick crust/scale.  No surrounding erythema or tenderness.    Psychiatric:  Normal mood and affect. Normal behavior       RECORDS REVIEW:   Psych recommendations reviewed.     ASSESSMENT     Diagnoses and all orders for this visit:    1. Fall, initial encounter (Primary)    2. Primary osteoarthritis of right knee    3. Essential hypertension    4. Frailty    5. Generalized weakness    6. Psoriasis    7. Acute conjunctivitis of both eyes, unspecified acute conjunctivitis type        PLAN  Recurrent falls: Continue with supportive care at SNF with all ADLs.  Continue with fall precautions/prevention.  No report of any acute injury post fall, he denies any pain upon assessment today.  Frailty/Generalized weakness:  With his recurrent falls and episodic agitation, obtain CBC and CMP in the AM.  Continue to encourage adequate hydration.  Was previously started on Remeron, he has good appetite eating almost all of his meals.  He is able to feed himself, nursing provide follow up support.   Psoriasis:  Start triamcinolone cream BID x 10 days.  No concerning signs for localized infection.   Conjunctivitis:  Start polytrim x 7 days.      [x]  Discussed Patient in detail with nursing/staff, addressed all needs today.     [x]  Plan of Care Reviewed   []  PT/OT Reviewed   []  Order Changes  []  Discharge Plans Reviewed  [x]  Advance Directive on file with Nursing Home.   [x]  POA on file with Nursing Home.    [x]  Code Status: DNR         Brisa Doss PA-C.  7/19/2021

## 2021-07-13 NOTE — PROGRESS NOTES
[facility] Cardiology Office Follow Up Note    Checo Michele  [id number]  07/14/2021    Primary Care Provider: Vermeesch, Marilyn K, MD   Referring Provider: No ref. provider found    Chief Complaint: Regular follow-up    History of Present Illness:   Mr. Checo Michele is a 89 y.o. male who presents to the Cardiology Clinic for regular follow-up.  The patient has a past medical history significant for hypertension, BPH, anxiety, and chronic right lower quadrant abdominal pain of unclear etiology.  He has a past cardiac history reportedly significant for coronary artery disease, however it is unclear what type of ischemic evaluation he has had in the past.  He usually presented to the [facility] on 11/22 for evaluation of abdominal pain.  At that time, he was found to be in complete heart block with a heart rates 25-30 bpm.  Other than fatigue, the patient was minimally symptomatic and hemodynamically stable.  He was subsequently transferred to [facility] and underwent implantation of a Medtronic leadless pacemaker.  The patient did well, and was discharged home the following day.  He was hypertensive after his carvedilol was held on discharge.  It was subsequently restarted by his primary care physician, and his blood pressure has been better controlled since restarting the medication.  His last cardiology clinic appointment, the patient complained of bilateral lower extremity edema which was present shortly after discharge from the hospital.  At that time he reported slow improvement in his lower extremity edema after he started using compression stockings.  He presents today for regular follow-up.  Patient presents today for regular follow-up.  He is accompanied by an aide who reports that the patient sleeps most of the hours of the day.  He notes that the patient had a late breakfast so it is not unusual for him to be sleeping at this  hour.    Past Cardiac Testin. Last Coronary Angio: Remote, report unavailable  2. Prior Stress Testing: None  3. Last Echo: 2021              1.  Normal left ventricular size with moderately reduced LV systolic function, LVEF approximately 30-35%.              2.  Mild hypertrophy of the basal septum.              3.  Normal right ventricular size and systolic function.              4.  Moderate mitral regurgitation.              5.  Mild aortic regurgitation.              6.  Mild tricuspid regurgitation.              7.  Trace pericardial effusion without tamponade physiology.              8.  Bilateral pleural effusions.  4. Prior Holter Monitor: None    Review of Systems: Unable to obtain due to patient sleeping  Review of Systems   Constitutional: Negative for activity change, chills, diaphoresis, fatigue, fever and unexpected weight gain.   Eyes: Negative for blurred vision and visual disturbance.   Respiratory: Negative for apnea, cough, chest tightness, shortness of breath and wheezing.    Cardiovascular: Negative for chest pain, palpitations and leg swelling.   Gastrointestinal: Negative for abdominal distention, blood in stool, GERD and indigestion.   Endocrine: Negative for cold intolerance and heat intolerance.   Genitourinary: Negative for hematuria.   Musculoskeletal: Negative for gait problem, joint swelling and myalgias.   Skin: Negative for color change, pallor and bruise.   Neurological: Negative for dizziness, seizures, syncope, weakness, light-headedness, numbness, headache and confusion.   Hematological: Does not bruise/bleed easily.   Psychiatric/Behavioral: Negative for behavioral problems, sleep disturbance, suicidal ideas and depressed mood.     I have reviewed and confirmed the accuracy of the ROS as documented by the MA/IMTIAZ/RN LINDA Marvin    I have reviewed and/or updated the patient's past medical, past surgical, family, social history, problem list and allergies  as appropriate.     Medications:     Current Outpatient Medications:   •  acetaminophen (TYLENOL) 325 MG tablet, Take 2 tablets by mouth Every 4 (Four) Hours As Needed for Mild Pain ., Disp: 60 tablet, Rfl: 0  •  Amino Acids-Protein Hydrolys (PRO-STAT) liquid oral liquid, Take 30 mL by mouth 2 (Two) Times a Day., Disp: , Rfl:   •  amoxicillin (AMOXIL) 250 MG capsule, , Disp: , Rfl:   •  aspirin 81 MG tablet, Take  by mouth daily., Disp: , Rfl:   •  carvedilol (COREG) 12.5 MG tablet, Take 1 tablet by mouth 2 (Two) Times a Day With Meals., Disp: , Rfl:   •  doxycycline (VIBRAMYCIN) 100 MG capsule, , Disp: , Rfl:   •  escitalopram (LEXAPRO) 10 MG tablet, Take 1 tablet by mouth Daily., Disp: 90 tablet, Rfl: 3  •  furosemide (LASIX) 40 MG tablet, Take 1 tablet by mouth Daily., Disp: , Rfl:   •  haloperidol lactate (HALDOL) 5 MG/ML injection, , Disp: , Rfl:   •  ipratropium-albuterol (DUO-NEB) 0.5-2.5 mg/3 ml nebulizer, Take 3 mL by nebulization Every 4 (Four) Hours As Needed for Shortness of Air., Disp: 360 mL, Rfl:   •  levothyroxine (SYNTHROID, LEVOTHROID) 50 MCG tablet, Take 1 tablet by mouth Daily., Disp: 90 tablet, Rfl: 3  •  mirtazapine (REMERON) 7.5 MG tablet, , Disp: , Rfl:   •  QUEtiapine (SEROquel) 25 MG tablet, Take 1 tablet by mouth Every Night., Disp: , Rfl:   •  tolterodine LA (DETROL LA) 4 MG 24 hr capsule, TAKE 1 CAPSULE DAILY (Patient taking differently: QHS), Disp: 90 capsule, Rfl: 3  •  triamcinolone (KENALOG) 0.1 % cream, , Disp: , Rfl:   •  Triamcinolone Acetonide (NASACORT) 55 MCG/ACT nasal inhaler, 2 sprays into the nostril(s) as directed by provider Daily., Disp: 16.5 g, Rfl: 11  •  trimethoprim-polymyxin b (POLYTRIM) 35944-3.1 UNIT/ML-% ophthalmic solution, , Disp: , Rfl:   •  atorvastatin (LIPITOR) 20 MG tablet, TAKE 1 TABLET DAILY, Disp: 90 tablet, Rfl: 3  •  ipratropium-albuterol (DUO-NEB) 0.5-2.5 mg/3 ml nebulizer, Take 3 mL by nebulization Every 6 (Six) Hours., Disp: 360 mL, Rfl:  "    Physical Exam:  Vital Signs:   Vitals:    07/14/21 1339   BP: 122/70   BP Location: Right arm   Patient Position: Sitting   Cuff Size: Small Adult   Pulse: 61   SpO2: 95%   Weight: 54 kg (119 lb)  Comment: per [facility]   Height: 177.8 cm (70\")     Body mass index is 17.07 kg/m².    Physical Exam  Vitals and nursing note reviewed.   Constitutional:       General: He is not in acute distress.     Appearance: Normal appearance. He is well-developed.      Comments: Elderly-appearing, wheelchair, portable oxygen   HENT:      Head: Normocephalic and atraumatic.   Eyes:      General: No scleral icterus.     Extraocular Movements: Extraocular movements intact.   Neck:      Trachea: Trachea normal.   Cardiovascular:      Rate and Rhythm: Normal rate and regular rhythm.      Pulses: Normal pulses.      Heart sounds: Normal heart sounds. No murmur heard.   No friction rub. No gallop.    Pulmonary:      Effort: Pulmonary effort is normal.      Breath sounds: Normal breath sounds. No stridor. No wheezing, rhonchi or rales.   Abdominal:      Palpations: Abdomen is soft.      Tenderness: There is no abdominal tenderness.   Musculoskeletal:         General: Normal range of motion.      Cervical back: Neck supple.      Right lower leg: No edema.      Left lower leg: No edema.   Skin:     General: Skin is warm and dry.      Findings: No bruising, lesion or rash.   Neurological:      Mental Status: He is alert.      Comments: Patient mostly slept during his examination today.   Psychiatric:         Mood and Affect: Mood normal.         Behavior: Behavior normal. Behavior is cooperative.         Thought Content: Thought content does not include suicidal ideation.         Results Review:   I reviewed the patient's new clinical results.      Assessment / Plan:     1.  Complete AV block  --Presented in 11/2020 with high degree 2-1 AV block, subsequently progressed to complete AV block  --Now status post Medtronic leadless pacemaker " implantation  --Normal device function per interrogation in 3/21  --He will need pacemaker interrogation at his next follow-up appointment  --Follow-up with Dr. Silver in 6 months or sooner if needed    2.  Hypertension  --Acceptable blood pressure today  --Continue carvedilol     3.  Coronary artery disease  --Reported history of CAD, however prior ischemic evaluation is unclear  --No current chest pain or anginal symptoms  --Continue daily aspirin and statin therapy     4.  Lower extremity edema  --Recent echocardiogram showed normal LV stock function, grade 2 diastolic dysfunction  --Euvolemic on exam today    The patient slept through most of his visit today.  He appeared to be clean and well cared for.  There were no nonverbal signs of pain or discomfort.  He rested comfortably with closed eyes and unlabored breathing during examination.  He did wake up in the lobby long enough for me to compliment his hat and acknowledge me as he left his visit today.      Preventative Cardiology:   Tobacco Cessation: N/A   Advance Care Planning: ACP discussion was declined by the patient. Patient has an advance directive in EMR which is still valid.      Follow Up:   Return in about 6 months (around 1/14/2022) for Follow-up with Dr. Silver.      Thank you for allowing me to participate in the care of your patient. Please to not hesitate to contact me with additional questions or concerns.     LINDA Main

## 2021-07-15 NOTE — PROGRESS NOTES
Nursing Home Progress Note        Kvng Mario DO []  LINDA Mora []  [address] [place] [address]  Phone: [phone]  Fax: [phone] Betsy Uribe MD []  Edmond Curry DO []  Brisa Doss PA-C [x]   [address] [place] [address]  Phone: [phone]  Fax: [phone]     PATIENT NAME: Checo Michele                                                                          YOB: 1925           DATE OF SERVICE: 7/15/2021  FACILITY: [facility]    CHIEF COMPLAINT:  Follow up on pneumonia.       HISTORY OF PRESENT ILLNESS:   Mr. Michele is a 89 y/o male patient who presents today for follow up on pneumonia.  Patient had event last week with increased secretions, thought to have had aspiration.  He has history of dysphagia, current diet purée with nectar thick liquids.  This is a chronic problem.  CXR concerning for pneumonia and he was subsequently started on Doxycycline, duo nebulizer, and machines.  Several days later he was noted to have oxygen saturation of 89% and he was placed on 2 liters NC with improvement.  Has continued to utilize on an as needed basis.  Upon assessment he is sitting in his wheelchair, NAD.  He is pleasant and interactive with staff.     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)    • Sleep apnea    • Spermatocele    • Syncope, near    • URTI (acute upper respiratory infection)       Past Surgical History:   Procedure Laterality Date   • CARDIAC ELECTROPHYSIOLOGY PROCEDURE N/A 11/24/2020    Procedure: DEVICE IMPLANT;  Surgeon: Julius Chatman DO;  Location: [facility] INVASIVE LOCATION;   Service: Cardiology;  Laterality: N/A;  MICRA   • CHOLECYSTECTOMY  20 years ago   • COLONOSCOPY  15-20 years ago   • COLONOSCOPY W/ POLYPECTOMY      Dr Toledo   • CYSTOSCOPY TRANSURETHRAL RESECTION OF PROSTATE     • PACEMAKER IMPLANTATION     • PROSTATECTOMY      non cancer, Dr Toledo   • TONSILLECTOMY  1931         MEDICATIONS:  I have reviewed and reconciled the patients medication list in the patients chart at the skilled nursing facility today.      ALLERGIES:  Allergies   Allergen Reactions   • Ace Inhibitors    • Amoxicillin-Pot Clavulanate    • Cefaclor    • Hydrochlorothiazide    • Nitrofurantoin          SOCIAL HISTORY:  Social History     Socioeconomic History   • Marital status:      Spouse name: Not on file   • Number of children: Not on file   • Years of education: Not on file   • Highest education level: Not on file   Tobacco Use   • Smoking status: Never Smoker   • Smokeless tobacco: Never Used   Vaping Use   • Vaping Use: Never used   Substance and Sexual Activity   • Alcohol use: Not Currently     Alcohol/week: 3.0 standard drinks     Types: 3 Cans of beer per week   • Drug use: No   • Sexual activity: Defer       FAMILY HISTORY:  Family History   Problem Relation Age of Onset   • Other Other         STROKE SYNDROME   • No Known Problems Mother    • No Known Problems Father    • Colon cancer Neg Hx        REVIEW OF SYSTEMS:    Unable to obtain ROS due to dementia.       PHYSICAL EXAMINATION:     VITAL SIGNS:  /74   Pulse 78   Temp 97.2 °F (36.2 °C)   Resp 18   Wt 54.3 kg (119 lb 12.8 oz)   SpO2 95%   BMI 17.19 kg/m²     Nursing notes and vital signs reviewed.   General Appearance:  Frail appearing elderly male, NAD   Head: Normocephalic and atraumatic, without obvious abnormality     Eyes: PERRLA.  Conjunctivae and sclerae normal.  EOM are within normal limits.    Neck: Normal range of motion. Neck supple. No JVD present.   Cardiovascular: Normal rate, regular rhythm.  Pulses  palpable equal bilaterally.    Pulmonary/Chest: Effort normal and breath sounds normal. No respiratory distress.   Abdominal: Soft. Bowel sounds are normal. No distention or tenderness.     Musculoskeletal: Normal range of motion. No edema.   Neurological: Alert and oriented at baseline.    Skin: Skin is warm and dry.   Psychiatric:  Normal mood and affect. Normal behavior     RECORDS REVIEW:   7/9: BUN 29, Cr. 0.9, Hgb 11.1, Hct 33.3, WBC 10.1 (previously 4.7)      ASSESSMENT     Diagnoses and all orders for this visit:    1. Aspiration pneumonia, unspecified aspiration pneumonia type, unspecified laterality, unspecified part of lung (CMS/[facility]) (Primary)    2. Dementia with behavioral disturbance, unspecified dementia type (CMS/[facility])    3. Frailty    4. Acute conjunctivitis of both eyes, unspecified acute conjunctivitis type        PLAN  Symptoms significantly improved from previous.  Continue with doxycycline, nebulizer, mucinex.  Will consult with SLP for further recommendations.  Diet nectar thick/purée.  Continue with aspiration precautions.  Nursing report appetite is good, weights are stable.  Conjunctivitis has resolved.  He is quite frail, continue with fall precautions.  Behaviors are stabilizing with minimal antipsychotic use.  Consider GDR in the future if symptoms continue to show improvement.      [x]  Discussed Patient in detail with nursing/staff, addressed all needs today.     [x]  Plan of Care Reviewed   []  PT/OT Reviewed   []  Order Changes  []  Discharge Plans Reviewed  [x]  Advance Directive on file with Nursing Home.   [x]  POA on file with Nursing Home.    [x]  Code Status: DNR    Advance Care Planning   ACP discussion was held with the patient during this visit. Patient has an advance directive in EMR which is still valid.   Discussed goals of care with daughter, POA.  These are clearly defined, no aggressive intervention or life prolonging measures.  CODE STATUS reviewed, DO NOT  RESUSCITATE.       Brisa Doss PA-C.  7/15/2021      I spent 37 minutes in direct patient care including face to face, floor time, and patient/family counseling.

## 2021-07-18 NOTE — PROGRESS NOTES
Nursing Home Progress Note        Kvng Mario DO []  LINDA Mora []  [address] [place] [address]  Phone: [phone]  Fax: [phone] Betsy Uribe MD []  Edmond Curry DO []  Brisa Doss PA-C [x]   [address] [place] [address]  Phone: [phone]  Fax: [phone]     PATIENT NAME: Checo Michele                                                                          YOB: 1925           DATE OF SERVICE: 7/1/2021  FACILITY: [facility]    CHIEF COMPLAINT:  Follow-up on agitation and recurrent falls.      HISTORY OF PRESENT ILLNESS:   Mr. Michele is a 89-year of age male who presents today for follow-up on agitation and recurrent falls.  Previously, patient had falls on 6/18 and 6/21.  No report of any acute injury or complaints after falls.  He has chronic history of agitation and poor mobility.  Can be combative with staff at times.  Nursing reports that he has had improvement to his agitation, is utilizing staff more often for his mobility issues.  Has been sitting throughout the day in a wheelchair, alert with his baseline confusion.  Upon assessment he is pleasant, denying any problems.  Overall he is a poor historian due to his dementia.    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)    • Sleep apnea    • Spermatocele    • Syncope, near    • URTI (acute upper respiratory infection)       Past Surgical History:   Procedure Laterality Date   • CARDIAC ELECTROPHYSIOLOGY PROCEDURE N/A 11/24/2020    Procedure: DEVICE IMPLANT;  Surgeon: Julius Chatman DO;  Location: [facility] INVASIVE LOCATION;  Service:  Cardiology;  Laterality: N/A;  MICRA   • CHOLECYSTECTOMY  20 years ago   • COLONOSCOPY  15-20 years ago   • COLONOSCOPY W/ POLYPECTOMY      Dr Toledo   • CYSTOSCOPY TRANSURETHRAL RESECTION OF PROSTATE     • PACEMAKER IMPLANTATION     • PROSTATECTOMY      non cancer, Dr Toledo   • TONSILLECTOMY  1931         MEDICATIONS:  I have reviewed and reconciled the patients medication list in the patients chart at the [facility] nursing facility today.      ALLERGIES:  Allergies   Allergen Reactions   • Ace Inhibitors    • Amoxicillin-Pot Clavulanate    • Cefaclor    • Hydrochlorothiazide    • Nitrofurantoin          SOCIAL HISTORY:  Social History     Socioeconomic History   • Marital status:      Spouse name: Not on file   • Number of children: Not on file   • Years of education: Not on file   • Highest education level: Not on file   Tobacco Use   • Smoking status: Never Smoker   • Smokeless tobacco: Never Used   Vaping Use   • Vaping Use: Never used   Substance and Sexual Activity   • Alcohol use: Not Currently     Alcohol/week: 3.0 standard drinks     Types: 3 Cans of beer per week   • Drug use: No   • Sexual activity: Defer       FAMILY HISTORY:  Family History   Problem Relation Age of Onset   • Other Other         STROKE SYNDROME   • No Known Problems Mother    • No Known Problems Father    • Colon cancer Neg Hx        REVIEW OF SYSTEMS:    Unable to perform ROS due to dementia.    PHYSICAL EXAMINATION:     VITAL SIGNS:  /73   Pulse 76   Temp 97.5 °F (36.4 °C)   Resp 18   Wt 56 kg (123 lb 6 oz)   SpO2 95%   BMI 17.70 kg/m²     Nursing notes and vital signs reviewed.   General Appearance:   Thin, frail male patient sitting up in wheelchair, no acute distress.   Head: Normocephalic and atraumatic, without obvious abnormality     Eyes: PERRLA.  Conjunctivae and sclerae normal.  EOM are within normal limits.    Neck: Normal range of motion. Neck supple. No JVD present.   Cardiovascular: Normal rate, regular  rhythm.  Pulses palpable equal bilaterally.    Pulmonary/Chest: Effort normal and breath sounds normal. No respiratory distress.   Abdominal: Soft. Bowel sounds are normal. No distention or tenderness.     Musculoskeletal: Normal range of motion. No edema.  Deconditioning noted   Neurological: Alert and oriented at baseline.    Skin: Skin is warm and dry.   Psychiatric:  Normal mood and affect. Normal behavior     RECORDS REVIEW:   N/A.    ASSESSMENT     Diagnoses and all orders for this visit:    1. Frequent falls (Primary)    2. Dementia with behavioral disturbance, unspecified dementia type (CMS/[facility])    3. Essential hypertension        PLAN  Continue fall precautions and prevention within facility.  Nursing reports that his episodes of agitation have improved from previous.  He is followed by psychiatry services.  Was started on Remeron, with no adverse effects reported.  His appetite is stable, he is very frail and thin.  Continue to monitor closely for any worsening signs or symptoms.  Blood pressure is well controlled, no orthostasis suspected.    [x]  Discussed Patient in detail with nursing/staff, addressed all needs today.     [x]  Plan of Care Reviewed   []  PT/OT Reviewed   []  Order Changes  []  Discharge Plans Reviewed  [x]  Advance Directive on file with Nursing Home.   [x]  POA on file with Nursing Home.    [x]  Code Status: DNR         Brisa Doss PA-C.  7/18/2021

## 2021-07-18 NOTE — PROGRESS NOTES
Nursing Home History and Physical        Kvng Mario DO []  LINDA Mora []  [address] [place] [address]  Phone: [phone]  Fax: [phone] Betsy Uribe MD[x]  Edmond Curry DO []   MIGUEL Patino []    [address] [place] [address]  Phone: [phone]  Fax: [phone]     PATIENT NAME: Checo Bazzi                                                                          YOB: 1925           DATE OF SERVICE: 06/17/2021  FACILITY:   [x] [facility] [] [facility]  [] [facility]    [] [facility]      ______________________________________________________________________    CHIEF COMPLAINT:  Initial visit, heart failure      HISTORY OF PRESENT ILLNESS:   Mr. Bazzi is a 89 years old gentleman with history of hypertension, carotid artery disease, seizure disorder and macular degeneration who was hospitalized at [facility] with a diagnosis of decompensated heart failure managed with diuresis.  There was a concern regarding pericardial effusion, Dr. Silver/cardiologist was consulted and recommended conservative measurements as he did not feel that patient's presentation is consistent with tamponade.  Palliative care consultation was done, goals of care were discussed with family who concluded to proceeding with comfort measures.      As patient's condition stabilized, he was transferred to this facility to initiate PT, OT and skilled nursing care with focus on quality of life and comfort.  If patient does not engage then family wish to proceed with hospice services.  They also expressed their wishes to defer further ER visits or hospitalization as well as any aggressive diagnostic or therapeutic work-up.  Since admission, patient was periodically anxious and confused but remained in stable condition.  His wife is in the facility and wishes to visit her on occasion.  During my visit, patient was in fact visiting his wife and  was in good spirits, he denied new complaints.    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 INVASIVE LOCATION;  Service: Cardiology;  Laterality: N/A;  MICRA   • CHOLECYSTECTOMY  20 years ago   • COLONOSCOPY  15-20 years ago   • COLONOSCOPY W/ POLYPECTOMY      Dr Toledo   • CYSTOSCOPY TRANSURETHRAL RESECTION OF PROSTATE     • PACEMAKER IMPLANTATION     • PROSTATECTOMY      non cancer, Dr Toledo   • TONSILLECTOMY  1931         MEDICATIONS:  I have reviewed and reconciled the patients medication list in the patients chart at the skilled nursing facility today.      ALLERGIES:  Allergies   Allergen Reactions   • Ace Inhibitors    • Amoxicillin-Pot Clavulanate    • Cefaclor    • Hydrochlorothiazide    • Nitrofurantoin          SOCIAL HISTORY:  Social History     Socioeconomic History   • Marital status:      Spouse name: Not on file   • Number of children: Not on file   • Years of education: Not on file   • Highest education level: Not on file   Tobacco Use   • Smoking status: Never Smoker   • Smokeless tobacco: Never Used   Vaping Use   • Vaping Use: Never used   Substance and Sexual Activity   • Alcohol use: Not Currently     Alcohol/week: 3.0 standard drinks     Types: 3 Cans of beer per week   • Drug use: No   • Sexual activity: Defer       FAMILY HISTORY:  Family History   Problem Relation Age of  Onset   • Other Other         STROKE SYNDROME   • No Known Problems Mother    • No Known Problems Father    • Colon cancer Neg Hx        REVIEW OF SYSTEMS:  Chronic fatigue, decreased mobility, deconditioning    All other systems were reviewed and are negative.    PHYSICAL EXAMINATION:   Vital signs: /72, HR 84/min, RR 18/min, temperature 97.4 °F O2 sat 95% on room air    Physical Exam    General Appearance:  Frail and chronically ill-appearing   Head and neck:  Atraumatic and normocephalic, without obvious abnormality.   Eyes:          PERRLA, conjunctivae and sclerae normal, no Icterus. No pallor. Extraocular movements are within normal limits.   Lungs:   Chest shape is normal. Breath sounds heard bilaterally equally.  No crackles or wheezing. No Pleural rub or bronchial breathing.     Heart:  Normal S1 and S2, no murmur, no gallop, no rub. No JVD.   Abdomen:   Normal bowel sounds, no masses, no organomegaly. Soft, nontender, nondistended, no guarding, no rebound tenderness.     Extremities: Moves all extremities well, no edema, no cyanosis, no clubbing.  Pulses palpable and equal bilaterally.     Musculoskeletal: Deconditioning noted.     Skin: No bleeding, bruising or rash.     Neurologic:    Psychiatric:                           Moves all four limbs equally.  Confusion noted  Calm affect     RECORDS REVIEW:   I have reviewed available medical records.    ASSESSMENT:  · Status post acute on chronic diastolic heart failure  · Bilateral pleural and pericardial effusions secondary to above, no evidence of tamponade  · Failure to thrive  · Debility and functional decline  · Severe malnutrition  · Dementia, unspecified    PLAN:  · Preadmission medications reviewed, reconciled and reviewed  · PT and OT as clinically indicated  · Fall and aspiration precautions   · Routine baseline labs  · Nutritional support recommended and monitored with interdisciplinary support  · Patient is being monitored closely, further  plans were modified accordingly    [x]  Discussed Patient in detail with nursing/staff, addressed all needs today.     [x]  Plan of Care Reviewed   [x]  PT/OT Reviewed     []  Wound assessment and management documentation reviewed    []  Antipsychotic medications and benzodiazepine addressed  []  Order Changes  []  Opioid medications addressed  []  Order Changes  []  Discharge Plans Reviewed   []  Advance Directive on file with Nursing Home.   []  POA on file with Nursing Home.   [x]  Code Status listed on patients chart at the nursing home facility.          Betsy Uribe MD.  6/17/2021

## 2021-07-22 NOTE — PROGRESS NOTES
Nursing Home Progress Note        Kvng Mario DO []  LINDA Mora []  [address] [place] [address]  Phone: [phone]  Fax: [phone] Betsy Uribe MD []  Edmond Curry DO []  Brisa Doss PA-C [x]   [address] [place] [address]  Phone: [phone]  Fax: [phone]     PATIENT NAME: Checo Michele                                                                          YOB: 1925           DATE OF SERVICE: 6/24/2021  FACILITY: [facility]    CHIEF COMPLAINT:  Agitation, increased behaviors.       HISTORY OF PRESENT ILLNESS:   Mr. Michele is a 89-year of age male who presents today with reports of agitation, increased behaviors.  Nursing reports that he has episodes of agitation where he is yelling and becomes physically combative with staff.  He is requiring Haldol as needed for such behaviors.  They note fall which occurred on 6/21 where patient was found sitting in his floor, no obvious injuries noted.  Safety precautions/preventions in place.  He has history of frailty most recently with bilateral pleural effusions and dysphagia.  He was previously living at home, transferred to this facility due to declining health and in increased needs with ADLs.    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)    • Sleep apnea    • Spermatocele    • Syncope, near    • URTI (acute upper respiratory infection)       Past Surgical History:   Procedure Laterality Date   • CARDIAC ELECTROPHYSIOLOGY PROCEDURE N/A 11/24/2020    Procedure: DEVICE IMPLANT;  Surgeon: Julius Chatman DO;  Location: [facility] INVASIVE LOCATION;   Service: Cardiology;  Laterality: N/A;  MICRA   • CHOLECYSTECTOMY  20 years ago   • COLONOSCOPY  15-20 years ago   • COLONOSCOPY W/ POLYPECTOMY      Dr Toledo   • CYSTOSCOPY TRANSURETHRAL RESECTION OF PROSTATE     • PACEMAKER IMPLANTATION     • PROSTATECTOMY      non cancer, Dr Toledo   • TONSILLECTOMY  1931         MEDICATIONS:  I have reviewed and reconciled the patients medication list in the patients chart at the skilled nursing facility today.      ALLERGIES:  Allergies   Allergen Reactions   • Ace Inhibitors    • Amoxicillin-Pot Clavulanate    • Cefaclor    • Hydrochlorothiazide    • Nitrofurantoin          SOCIAL HISTORY:  Social History     Socioeconomic History   • Marital status:      Spouse name: Not on file   • Number of children: Not on file   • Years of education: Not on file   • Highest education level: Not on file   Tobacco Use   • Smoking status: Never Smoker   • Smokeless tobacco: Never Used   Vaping Use   • Vaping Use: Never used   Substance and Sexual Activity   • Alcohol use: Not Currently     Alcohol/week: 3.0 standard drinks     Types: 3 Cans of beer per week   • Drug use: No   • Sexual activity: Defer       FAMILY HISTORY:  Family History   Problem Relation Age of Onset   • Other Other         STROKE SYNDROME   • No Known Problems Mother    • No Known Problems Father    • Colon cancer Neg Hx        REVIEW OF SYSTEMS:    Unable to preform ROS due to dementia.     PHYSICAL EXAMINATION:     VITAL SIGNS:  /70   Pulse 77   Temp 96.9 °F (36.1 °C)   Resp 20   Wt 56.3 kg (124 lb 2 oz)   SpO2 95%   BMI 17.81 kg/m²     Nursing notes and vital signs reviewed.   General Appearance:  Frail, thin elderly male sitting upright in wheelchair.  NAD.    Head: Normocephalic and atraumatic, without obvious abnormality     Eyes: PERRLA.  Conjunctivae and sclerae normal.  EOM are within normal limits.    Neck: Normal range of motion. Neck supple. No JVD present.   Cardiovascular: Normal rate,  regular rhythm.  Pulses palpable equal bilaterally.    Pulmonary/Chest: Effort normal and breath sounds normal. No respiratory distress.   Abdominal: Soft. Bowel sounds are normal. No distention or tenderness.     Musculoskeletal: Normal range of motion. No edema.   Neurological: Alert, chronic Douglas.  Appears with baseline cognition, able to follow commands.  Answers questions appropriately.     Skin: Skin is warm and dry.   Psychiatric:  Normal mood and affect. Normal behavior       RECORDS REVIEW:   6/17:  Sodium 137, K 3.8, BUN 20, Cr. 0.8, ALT 12., AST 21, WBC 4.7, Hgb 12.1, Hct 35.2, TSH 6.175    ASSESSMENT     Diagnoses and all orders for this visit:    1. Dementia with behavioral disturbance, unspecified dementia type (CMS/HCC) (Primary)    2. Frailty    3. Essential hypertension    4. Failure to thrive in adult    5. Acquired hypothyroidism        PLAN  Dementia:   Patient having fluctuation in cognition, with increased behaviors.  At times he is very combative with staff, verbally abusive.  He has Haldol PRN x 3 days, requiring 2 doses.  He is also taking Seroquel at night.  He is very thin/frail, per nursing is able to feed himself.  Current diet is dysphagia advanced.  Start Remeron 7.5 mg QHS to aid with mood and appetite.  Add Seroquel 25 mg in the morning.  Haldol to fall off after 3 days.  Continue with nonpharmacologic interventions.  Monitor closely for any worsening s/s.  Continue with fall precautions/preventions.   HTN:  Controlled, continue current medications.   Hypothyroidism:  Mildly elevated, suspect poor compliance with medications.  Will repeat TSH in 4 weeks.      [x]  Discussed Patient in detail with nursing/staff, addressed all needs today.     [x]  Plan of Care Reviewed   []  PT/OT Reviewed   []  Order Changes  []  Discharge Plans Reviewed  [x]  Advance Directive on file with Nursing Home.   [x]  POA on file with Nursing Home.    [x]  Code Status: DNR         Brisa Doss  HOANG.  7/22/2021

## 2021-08-05 NOTE — PROGRESS NOTES
Nursing Home Progress Note        Kvng Mario DO []  LINDA Mora []  [address] [place] [address]  Phone: [phone]  Fax: [phone] Betsy Uribe MD []  Edmond Curry DO []  Brisa Doss PA-C [x]   [address] [place] [address]  Phone: [phone]  Fax: [phone]     PATIENT NAME: Checo Michele                                                                          YOB: 1925           DATE OF SERVICE: 8/5/2021  FACILITY: [facility]    CHIEF COMPLAINT:  Recurrent falls.       HISTORY OF PRESENT ILLNESS:   Mr. Michele is a 89 y/o male who presents today at request of family and staff with recurrent falls.   Patient has history of recurrent falls, sustaining several at home.  Most recently he sustained hematoma to his left forehead.  No LOC, neurochecks per facility protocol initiated.  He has been at his baseline per nursing staff.  Work up initiated earlier this week, largely reassuring.  UA pending at this time.  Upon assessment, patient is lying in bed sleeping appearing comfortable.  His wife was recently moved into the same room with him.  Per nursing, they were up most of the night.  Both have had episodic behaviors.     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)    • Sleep apnea    • Spermatocele    • Syncope, near    • URTI (acute upper respiratory infection)       Past Surgical History:   Procedure Laterality Date   • CARDIAC ELECTROPHYSIOLOGY PROCEDURE N/A 11/24/2020    Procedure: DEVICE IMPLANT;  Surgeon: Julius Chatman DO;  Location: [facility] INVASIVE LOCATION;  Service: Cardiology;   Laterality: N/A;  MICRA   • CHOLECYSTECTOMY  20 years ago   • COLONOSCOPY  15-20 years ago   • COLONOSCOPY W/ POLYPECTOMY      Dr Toledo   • CYSTOSCOPY TRANSURETHRAL RESECTION OF PROSTATE     • PACEMAKER IMPLANTATION     • PROSTATECTOMY      non cancer, Dr Toledo   • TONSILLECTOMY  1931         MEDICATIONS:  I have reviewed and reconciled the patients medication list in the patients chart at the [facility] nursing facility today.      ALLERGIES:  Allergies   Allergen Reactions   • Ace Inhibitors    • Amoxicillin-Pot Clavulanate    • Cefaclor    • Hydrochlorothiazide    • Nitrofurantoin          SOCIAL HISTORY:  Social History     Socioeconomic History   • Marital status:      Spouse name: Not on file   • Number of children: Not on file   • Years of education: Not on file   • Highest education level: Not on file   Tobacco Use   • Smoking status: Never Smoker   • Smokeless tobacco: Never Used   Vaping Use   • Vaping Use: Never used   Substance and Sexual Activity   • Alcohol use: Not Currently     Alcohol/week: 3.0 standard drinks     Types: 3 Cans of beer per week   • Drug use: No   • Sexual activity: Defer       FAMILY HISTORY:  Family History   Problem Relation Age of Onset   • Other Other         STROKE SYNDROME   • No Known Problems Mother    • No Known Problems Father    • Colon cancer Neg Hx        REVIEW OF SYSTEMS:    Unable to obtain ROS due to dementia.       PHYSICAL EXAMINATION:     VITAL SIGNS:  /72   Pulse 65   Temp 97.8 °F (36.6 °C)   Resp 18   Wt 53.6 kg (118 lb 4 oz)   SpO2 97%   BMI 16.97 kg/m²     Nursing notes and vital signs reviewed.   General Appearance:  Frail, chronically ill appearing male.  NAD.     Head: Normocephalic.  Left sided forehead contusion.     Eyes: PERRLA.  Conjunctivae and sclerae normal.  No drainage.     Neck: Trachea midline.  Neck supple. No JVD present.   Cardiovascular: Normal rate, regular rhythm.  Pulses palpable equal bilaterally.    Pulmonary/Chest:  Effort normal and breath sounds normal. No respiratory distress.   Abdominal: Soft. Bowel sounds are normal. No distention or tenderness.     Musculoskeletal: ROM normal to all major joints. Diffuse deconditioning noted.  No edema.   Neurological: Sleeping, awakens easily.  Oriented at baseline.    Skin: Skin is warm and dry.   Scattered superficial abrasions to the skin.   Psychiatric:  Normal mood and affect. Normal behavior.  No agitation or restlessness.         RECORDS REVIEW:   CBC and BMP: Sodium 145, potassium 4.0, BUN 17, creatinine 0.7, WBCs 8.0, hemoglobin 10.8, hematocrit 32.8.  UA pending: Leukocyte +3 nitrite negative WBCs <50.     ASSESSMENT     Diagnoses and all orders for this visit:    1. Frequent falls (Primary)    2. Frailty    3. Dementia with behavioral disturbance, unspecified dementia type (CMS/HCC)    4. Dysphagia, unspecified type    5. Generalized weakness    6. Failure to thrive in adult    7. Acquired hypothyroidism      PLAN  Frequent falls/frailty:  Discussion held with family about recurrent falls.  Patient has significant weakness and longstanding history of recurrent falls.  To limit risk for injury, discontinue ASA.  Discussed with nursing current interventions in place.  Have replaced standard call bell for a manual bell, which patient has previously used.  They report that patient has been ringing bell when he requires assistance.  He continues to attempt to ambulate without help.  Continue PT/OT for strengthening.  Staff are working diligently to maximize fall precautions, working with patient and family in regards to nonpharcologic interventions.     Dementia with behaviors:  He has had less agitation, minimal verbal/physical abuse towards staff.  Continue current regimen, which is at the lowest effective dose at this time.  Seroquel 25 mg BID.      Dysphagia:  Chronic.  SLP has reported continued risk for aspiration.  Current diet is nectar thick and purée.   As per previous  GOC discussions, family has declined feeding tube.  Continue to practice aspiration precautions to minimize risk.  He is out of bed for meals, able to feed himself with minimal assistance.  Appetite is stable.  Unfortunately patient is at high risk for recurrent aspiration/pneumonia.  Prognosis is very poor.  Continue to monitor closely for any acute changes.      Hypothyroidism:  Stable on current dose Synthroid.  TSH recently obtained.      Advance Care Planning   ACP discussion was held with the patient during this visit. Patient has an advance directive in EMR which is still valid.    POA:  Lore Michele  CODE STATUS:  DO NOT RESUSCITATE.  GOC discussion: Care centered around optimizing patient comfort, minimize transport to hospital, if patient has significant decline and his condition appears terminal allow for natural death.      [x]  Discussed Patient in detail with nursing/staff, addressed all needs today.     [x]  Plan of Care Reviewed   []  PT/OT Reviewed   []  Order Changes  []  Discharge Plans Reviewed  [x]  Advance Directive on file with Nursing Home.   [x]  POA on file with Nursing Home.    [x]  Code Status: DNR         Brisa Doss PA-C.  8/5/2021

## 2021-09-15 PROCEDURE — 93296 REM INTERROG EVL PM/IDS: CPT | Performed by: INTERNAL MEDICINE

## 2021-09-15 PROCEDURE — 93294 REM INTERROG EVL PM/LDLS PM: CPT | Performed by: INTERNAL MEDICINE

## 2021-09-15 NOTE — PROGRESS NOTES
Nursing Home Discharge Summary      Kvng Mario DO  []  LINDA Mora  []  [address] [place] [address]  Phone: [phone]  Fax: [phone] Betsy Uribe MD  []  Edmond Curry DO  []  Brisa Doss PA-C  [x]  [address] [place] [address]  Phone: [phone]  Fax: [phone]     PATIENT NAME: Checo Bazzi                                                                          YOB: 1925     Age:  89 y.o.  Sex:  male  DATE OF SERVICE: 9/2/2021  Primary Care Physician:  Vermeesch, Marilyn K, MD   Date of Discharge:  9/2/2021  Admission Date:  6/15/2021  FACILITY: [facility]    Discharge Diagnosis:    Encounter Diagnoses   Name Primary?   • Cardiopulmonary arrest (CMS/HCC) Yes   • Dementia with behavioral disturbance, unspecified dementia type (CMS/HCC)    • Failure to thrive in adult    • Frailty         Presenting Problem: Adult failure to thrive, frailty    History of Presenting Illness:  Mr. Bazzi is a 89 years old gentleman with history of hypertension, carotid artery disease, seizure disorder and macular degeneration who was hospitalized at [facility] with a diagnosis of decompensated heart failure managed with diuresis.  There was a concern regarding pericardial effusion, Dr. Silver/cardiologist was consulted and recommended conservative measurements as he did not feel that patient's presentation is consistent with tamponade.  Palliative care consultation was done, goals of care were discussed with family who concluded to proceeding with comfort measures.       As patient's condition stabilized, he was transferred to this facility to initiate PT, OT and skilled nursing care with focus on quality of life and comfort.  Family also requested to defer transfer to ER or further hospitalization.  To further align with goals of care, any aggressive diagnostic or therapeutic work-up deferred.  Patient adjusted well to  the facility, with periodic anxiety and increased confusion at times.  Symptoms well managed with pharmacologic and non-pharmacologic interventions.  Patient continued to have progressive decline, requiring assistance with most all ADLs.      Today, I was called to bedside by nursing for concerns of cardiac arrest.  Per nursing, patient had breakfast this morning and appeared in his usual health.  During nursing rounds, patient was noted to be apneic and pulseless.  Code status confirmed, DO NOT RESUSCITATE.  Upon arrival to bedside, patient confirmed pulseless and apneic.  Patient pronounced at 12:05 PM and family notified.  Family expressed their gratitude with patient's care, emotional support and condolences offered.        PHYSICAL EXAMINATION:     VITAL SIGNS:  There were no vitals taken for this visit.    Nursing notes and vital signs reviewed.   General Appearance:  Unresponsive frail, cachetic appearing malelying in bed.    Head: Normocephalic and atraumatic, without obvious abnormality     Eyes: Fixed, dialted   Neck: Trachea midline   Cardiovascular: Pulseless, no cardiac sounds auscultated after 1 minute    Pulmonary/Chest: Apneic, no breath sounds auscultated after 1 minute   Abdominal: Soft, non-distended    Musculoskeletal: No edema.       Condition on Discharge:        Discharge Medication:  No current outpatient medications on file.     Time: Discharge 22 min    Brisa Doss PA-C   2021

## 2021-12-21 NOTE — PHARMACY RECOMMENDATION
"Pharmacy Consult for Pharmacokinetics and Stewardship of Anticoagulation Drugs     Checo Michele is a  89 y.o. male.  Height - 177.8 cm (70\")  Weight - 56.3 kg (124 lb 1.9 oz)    BMI (Body Mass Index) = 17.81 kg/m²    Pharmacy consulted for dosing of Enoxaparin.  Indication for use: VTE prophylaxis.      Labs:  Estimated Creatinine Clearance: 41.9 mL/min (by C-G formula based on SCr of 0.84 mg/dL).  Results from last 7 days   Lab Units 06/13/21  0611 06/12/21  0559 06/11/21  2132   HEMOGLOBIN g/dL  --  13.0 13.4   HEMATOCRIT %  --  38.4 40.7   PLATELETS 10*3/mm3  --  250 234   CREATININE mg/dL 0.84 0.64* 0.85       Assessment/Plan:  Initiated Enoxaparin 30 mg subq q 24 hrs for VTE prophylaxis in low-weight patient (male < 57 kg).    Pharmacy will continue to follow BMP/CMP and CBC results and monitor for signs and symptoms of bleeding or thrombosis.    Thank you for the opportunity to consult on this patient.    Marcus Mathews, Pharm.D.  06/13/21  09:12 EDT      " neck, arthritis

## (undated) DEVICE — GW XCHG AMPLTZ XSTIF PTFE CRV .035IN 3X180CM

## (undated) DEVICE — DRSNG SURESITE123 4X4.8IN

## (undated) DEVICE — SKIN PREP TRAY W/CHG: Brand: MEDLINE INDUSTRIES, INC.

## (undated) DEVICE — DECANT BG O JET

## (undated) DEVICE — DIL VESL .038 12F 19CM

## (undated) DEVICE — CANN NASL CO2 DIVIDED A/

## (undated) DEVICE — CATH EP SUPRM QUADPOLAR JSN 5F 5MM 120CM

## (undated) DEVICE — LIMB HOLDER, WRIST/ANKLE: Brand: DEROYAL

## (undated) DEVICE — ST INF PRI SMRTSTE 20DRP 2VLV 24ML 117

## (undated) DEVICE — AVANTI + 5F STD W/GW: Brand: AVANTI

## (undated) DEVICE — DIL VESL .038 14F 19CM

## (undated) DEVICE — ADULT, W/LG. BACK PAD, RADIOTRANSPARENT ELEMENT AND LEAD WIRE: Brand: DEFIBRILLATION ELECTRODES

## (undated) DEVICE — SET PRIMARY GRVTY 10DP MALE LL 104IN

## (undated) DEVICE — ST EXT IV SMARTSITE 2VLV SP M LL 5ML IV1

## (undated) DEVICE — SOL NACL 0.9PCT 1000ML

## (undated) DEVICE — SHEATH INTRO MICRA HC 23F 55.7CM

## (undated) DEVICE — DIL VESL .038 16F 19CM

## (undated) DEVICE — LEX ELECTRO PHYSIOLOGY: Brand: MEDLINE INDUSTRIES, INC.

## (undated) DEVICE — SI AVANTI+ 8F STD W/GW  NO OBT: Brand: AVANTI